# Patient Record
Sex: MALE | Race: WHITE | Employment: OTHER | URBAN - NONMETROPOLITAN AREA
[De-identification: names, ages, dates, MRNs, and addresses within clinical notes are randomized per-mention and may not be internally consistent; named-entity substitution may affect disease eponyms.]

---

## 2016-09-30 LAB
BUN BLDV-MCNC: NORMAL MG/DL
CALCIUM SERPL-MCNC: NORMAL MG/DL
CHLORIDE BLD-SCNC: NORMAL MMOL/L
CHOLESTEROL, TOTAL: 143 MG/DL
CHOLESTEROL/HDL RATIO: 4.5
CO2: NORMAL MMOL/L
CREAT SERPL-MCNC: NORMAL MG/DL
GFR CALCULATED: NORMAL
GLUCOSE BLD-MCNC: 106 MG/DL
HDLC SERPL-MCNC: 32 MG/DL (ref 35–70)
LDL CHOLESTEROL CALCULATED: 75.2 MG/DL (ref 0–160)
POTASSIUM SERPL-SCNC: NORMAL MMOL/L
SODIUM BLD-SCNC: NORMAL MMOL/L
TRIGL SERPL-MCNC: 179 MG/DL
VLDLC SERPL CALC-MCNC: 36 MG/DL

## 2017-04-24 RX ORDER — MELOXICAM 15 MG/1
15 TABLET ORAL DAILY
Qty: 30 TABLET | Refills: 5 | Status: SHIPPED | OUTPATIENT
Start: 2017-04-24 | End: 2018-05-08 | Stop reason: SDUPTHER

## 2017-04-24 RX ORDER — LISINOPRIL 20 MG/1
20 TABLET ORAL DAILY
Qty: 30 TABLET | Refills: 5 | Status: SHIPPED | OUTPATIENT
Start: 2017-04-24 | End: 2018-04-17 | Stop reason: ALTCHOICE

## 2017-08-02 DIAGNOSIS — R19.7 DIARRHEA, UNSPECIFIED TYPE: Primary | ICD-10-CM

## 2017-08-04 DIAGNOSIS — E78.00 PURE HYPERCHOLESTEROLEMIA: Primary | ICD-10-CM

## 2017-08-07 RX ORDER — SIMVASTATIN 40 MG
40 TABLET ORAL NIGHTLY
Qty: 30 TABLET | Refills: 3 | Status: SHIPPED | OUTPATIENT
Start: 2017-08-07 | End: 2018-04-17 | Stop reason: ALTCHOICE

## 2017-10-05 VITALS
DIASTOLIC BLOOD PRESSURE: 84 MMHG | HEART RATE: 80 BPM | WEIGHT: 315 LBS | SYSTOLIC BLOOD PRESSURE: 132 MMHG | HEIGHT: 70 IN | BODY MASS INDEX: 45.1 KG/M2

## 2017-10-05 DIAGNOSIS — I87.2 VENOUS INSUFFICIENCY (CHRONIC) (PERIPHERAL): ICD-10-CM

## 2017-10-05 DIAGNOSIS — G47.33 OBSTRUCTIVE SLEEP APNEA (ADULT) (PEDIATRIC): ICD-10-CM

## 2017-10-05 DIAGNOSIS — F17.210 CIGARETTE SMOKER: ICD-10-CM

## 2017-10-05 DIAGNOSIS — I10 UNSPECIFIED ESSENTIAL HYPERTENSION: ICD-10-CM

## 2017-10-05 DIAGNOSIS — K08.9 DISORDER OF TEETH AND SUPPORTING STRUCTURES: ICD-10-CM

## 2017-10-05 PROBLEM — E66.01 MORBID OBESITY (HCC): Status: ACTIVE | Noted: 2017-10-05

## 2017-10-05 PROBLEM — M16.11 OSTEOARTHRITIS OF RIGHT HIP: Status: ACTIVE | Noted: 2017-10-05

## 2017-10-05 PROBLEM — E78.5 HYPERLIPIDEMIA: Status: ACTIVE | Noted: 2017-10-05

## 2017-10-05 RX ORDER — LISINOPRIL AND HYDROCHLOROTHIAZIDE 25; 20 MG/1; MG/1
1 TABLET ORAL DAILY
COMMUNITY
End: 2017-10-17 | Stop reason: CLARIF

## 2017-10-05 RX ORDER — BACLOFEN 20 MG/1
20 TABLET ORAL 3 TIMES DAILY
COMMUNITY
End: 2018-07-24 | Stop reason: ALTCHOICE

## 2017-10-05 RX ORDER — ASPIRIN 325 MG
325 TABLET ORAL DAILY
COMMUNITY
End: 2020-02-04 | Stop reason: CLARIF

## 2017-10-17 ENCOUNTER — OFFICE VISIT (OUTPATIENT)
Dept: FAMILY MEDICINE CLINIC | Age: 52
End: 2017-10-17
Payer: MEDICARE

## 2017-10-17 VITALS
DIASTOLIC BLOOD PRESSURE: 60 MMHG | HEART RATE: 84 BPM | BODY MASS INDEX: 45.1 KG/M2 | WEIGHT: 315 LBS | HEIGHT: 70 IN | SYSTOLIC BLOOD PRESSURE: 126 MMHG

## 2017-10-17 DIAGNOSIS — E66.01 MORBID OBESITY (HCC): ICD-10-CM

## 2017-10-17 DIAGNOSIS — Z23 NEED FOR TETANUS BOOSTER: ICD-10-CM

## 2017-10-17 DIAGNOSIS — Z23 NEED FOR INFLUENZA VACCINATION: ICD-10-CM

## 2017-10-17 DIAGNOSIS — I10 ESSENTIAL HYPERTENSION: Primary | ICD-10-CM

## 2017-10-17 DIAGNOSIS — M16.11 PRIMARY OSTEOARTHRITIS OF RIGHT HIP: ICD-10-CM

## 2017-10-17 DIAGNOSIS — G47.33 OBSTRUCTIVE SLEEP APNEA: ICD-10-CM

## 2017-10-17 DIAGNOSIS — E78.2 MIXED HYPERLIPIDEMIA: ICD-10-CM

## 2017-10-17 DIAGNOSIS — I87.2 VENOUS INSUFFICIENCY (CHRONIC) (PERIPHERAL): ICD-10-CM

## 2017-10-17 DIAGNOSIS — M54.5 CHRONIC BILATERAL LOW BACK PAIN, WITH SCIATICA PRESENCE UNSPECIFIED: ICD-10-CM

## 2017-10-17 DIAGNOSIS — G89.29 CHRONIC BILATERAL LOW BACK PAIN, WITH SCIATICA PRESENCE UNSPECIFIED: ICD-10-CM

## 2017-10-17 PROCEDURE — 4004F PT TOBACCO SCREEN RCVD TLK: CPT | Performed by: FAMILY MEDICINE

## 2017-10-17 PROCEDURE — 90715 TDAP VACCINE 7 YRS/> IM: CPT | Performed by: FAMILY MEDICINE

## 2017-10-17 PROCEDURE — G8427 DOCREV CUR MEDS BY ELIG CLIN: HCPCS | Performed by: FAMILY MEDICINE

## 2017-10-17 PROCEDURE — 3017F COLORECTAL CA SCREEN DOC REV: CPT | Performed by: FAMILY MEDICINE

## 2017-10-17 PROCEDURE — G0008 ADMIN INFLUENZA VIRUS VAC: HCPCS | Performed by: FAMILY MEDICINE

## 2017-10-17 PROCEDURE — 90688 IIV4 VACCINE SPLT 0.5 ML IM: CPT | Performed by: FAMILY MEDICINE

## 2017-10-17 PROCEDURE — G8417 CALC BMI ABV UP PARAM F/U: HCPCS | Performed by: FAMILY MEDICINE

## 2017-10-17 PROCEDURE — 99214 OFFICE O/P EST MOD 30 MIN: CPT | Performed by: FAMILY MEDICINE

## 2017-10-17 PROCEDURE — G8484 FLU IMMUNIZE NO ADMIN: HCPCS | Performed by: FAMILY MEDICINE

## 2017-10-17 RX ORDER — OXYCODONE HYDROCHLORIDE AND ACETAMINOPHEN 5; 325 MG/1; MG/1
1 TABLET ORAL EVERY 4 HOURS PRN
COMMUNITY
End: 2018-07-24 | Stop reason: ALTCHOICE

## 2017-10-17 ASSESSMENT — ENCOUNTER SYMPTOMS
ABDOMINAL DISTENTION: 0
SHORTNESS OF BREATH: 0
CHEST TIGHTNESS: 0
SORE THROAT: 0
WHEEZING: 0
ABDOMINAL PAIN: 0

## 2017-10-17 ASSESSMENT — PATIENT HEALTH QUESTIONNAIRE - PHQ9
1. LITTLE INTEREST OR PLEASURE IN DOING THINGS: 0
2. FEELING DOWN, DEPRESSED OR HOPELESS: 0
SUM OF ALL RESPONSES TO PHQ9 QUESTIONS 1 & 2: 0
SUM OF ALL RESPONSES TO PHQ QUESTIONS 1-9: 0

## 2017-10-17 NOTE — PROGRESS NOTES
[address] [address] [address]  Dept: [phone]  Dept Fax: [phone]    Tristan Clark is a 46 y.o. male who presents today for his medical conditions/complaints as noted below. Tristan Clark is c/o of 6 Month Follow-Up; Hyperlipidemia (denies chest pains, sob, dizziness, palpatations. c/o leg edema); and Hypertension (BP cks on occasion, denies chest pains, sob, dizziness, palpatations. c/o leg edema)      HPI:     HPI  Patient is here for routine 6 month check up  Hypertension  Checking blood pressures routinely; tries to do it daily ; readings vary   well controlled; BP: 126/60   No problems with medication side effects; tolerating well  No chest pain  No edema   Hyperlipidemia   Takes medication daily   Cholesterol 143; HDL 32; LDL 75 ; 9/2016  Medication side effects - none   The CVD Risk score (D'Agostino, et al., 2008) failed to calculate for the following reasons: The patient has a prior MI, stroke, CHF, or peripheral vascular disease diagnosis  Chest pain -   Claudication -   TEOFILO  Has CPAP, compliant with: yes  Feels rested upon awakening  Denies excessive somnolence  Last evaluated ? Pulmonologist: Dr Keisha Francis  Pulmonary function tests: ?   COPD  Also see Dr Keisha Francis for this but continues to smoke 1 PPD   Pain management   See Dr Zain Guerrero for his back and his right hip  On baclofen and percocets; percocets once a day  Foreign body  Dr Noah Cabot removed a needle from his right heel back in August  Doesn't recall a tetanus shot; no record of one from ER and none in Dr Noah Cabot office     BP Readings from Last 3 Encounters:   10/17/17 126/60   04/04/17 132/84            (goal 120/80)    Past Medical History:   Diagnosis Date    Hyperlipidemia     Hypertriglyceridemia     Obesity     Tobacco abuse       Past Surgical History:   Procedure Laterality Date    ABSCESS DRAINAGE  08/2014    thrombosed external hemorrhoid Dr Tinnie Cabot History   Problem Relation Age of Onset    Diabetes Mother     Cancer Father     Other Brother      bad valve in heart     Social History   Substance Use Topics    Smoking status: Current Every Day Smoker     Packs/day: 1.00    Smokeless tobacco: Never Used    Alcohol use No        Current Outpatient Prescriptions   Medication Sig Dispense Refill    oxyCODONE-acetaminophen (PERCOCET) 5-325 MG per tablet Take 1 tablet by mouth every 4 hours as needed for Pain  Taking differently ; once daily .  baclofen (LIORESAL) 20 MG tablet Take 20 mg by mouth 3 times daily      Handicap Placard MISC by Does not apply route      CPAP Machine MISC by Does not apply route      aspirin 325 MG tablet Take 325 mg by mouth daily      simvastatin (ZOCOR) 40 MG tablet Take 1 tablet by mouth nightly 30 tablet 3    lisinopril (PRINIVIL;ZESTRIL) 20 MG tablet Take 1 tablet by mouth daily 30 tablet 5    meloxicam (MOBIC) 15 MG tablet Take 1 tablet by mouth daily 30 tablet 5     No current facility-administered medications for this visit. No Known Allergies    Health Maintenance   Topic Date Due    Hepatitis C screen  1965    Pneumococcal med risk (1 of 1 - PPSV23) 06/29/1984    Colon cancer screen colonoscopy  06/29/2015    DTaP/Tdap/Td vaccine (2 - Td) 07/13/2017    Flu vaccine (1) 09/01/2017    HIV screen  10/17/2027 (Originally 6/29/1980)    Lipid screen  09/30/2021       Subjective:      Review of Systems   Constitutional: Positive for activity change (he tries to walk every day ;and tries to watch what he eats). Negative for appetite change, chills, diaphoresis and fever. HENT: Negative for sore throat. Respiratory: Negative for chest tightness, shortness of breath and wheezing. Cardiovascular: Negative for chest pain, palpitations and leg swelling. Gastrointestinal: Negative for abdominal distention and abdominal pain. Genitourinary: Negative for difficulty urinating, dysuria and hematuria. Skin: Positive for rash (no sores on his legs). Hematological: Negative for adenopathy. Objective:     /60   Pulse 84   Ht 5' 10\" (1.778 m)   Wt (!) 418 lb (189.6 kg)   BMI 59.98 kg/m²     Physical Exam   Constitutional: He appears well-developed and well-nourished. No distress. Super morbid obesity    HENT:   Head: Normocephalic and atraumatic. Right Ear: Tympanic membrane normal.   Left Ear: Tympanic membrane normal.   Nose: Nose normal.   Mouth/Throat: No posterior oropharyngeal edema or posterior oropharyngeal erythema. Neck: Neck supple. Carotid bruit is not present. No thyromegaly present. Cardiovascular: Regular rhythm, S1 normal and S2 normal.    No murmur heard. Pulmonary/Chest: He has decreased breath sounds. He has no wheezes. He has no rhonchi. He has no rales. Abdominal: Soft. Bowel sounds are normal. There is no hepatosplenomegaly. There is no tenderness. Musculoskeletal: He exhibits no edema. Right hip: He exhibits decreased range of motion and tenderness. Antalgic gait over right hip    Lymphadenopathy:     He has no cervical adenopathy. He has no axillary adenopathy. Skin: No rash noted. Vitals reviewed. Assessment:     1. Essential hypertension  Comprehensive Metabolic Panel, Fasting    CBC Auto Differential   2. Mixed hyperlipidemia  Lipid Panel   3. Morbid obesity ([address]     4. Obstructive sleep apnea     5. Primary osteoarthritis of right hip     6. Venous insufficiency (chronic) (peripheral)     7. Chronic bilateral low back pain, with sciatica presence unspecified     8. Need for tetanus booster  Tetanus vaccine IM   9. Need for influenza vaccination  INFLUENZA, QUADV, 3 YRS AND OLDER, IM, MDV, 0.5ML ([facility])            POC Testing Results (If Applicable):  No results found for this visit on 10/17/17.     Plan:      encouraged to lose weight   Encouraged to quit smoking   Offered assistance for each of these if desired     Orders

## 2018-04-12 LAB
A/G RATIO: 1.3 RATIO
AGE FOR GFR: 52
ALBUMIN: 3.9 G/DL
ALK PHOSPHATASE: 81 UNITS/L
ALT SERPL-CCNC: 74 UNITS/L
ANION GAP SERPL CALCULATED.3IONS-SCNC: 17 MMOL/L
AST SERPL-CCNC: 51 UNITS/L
BASOPHILS # BLD: 0.14 THOU/MM3
BILIRUB SERPL-MCNC: 0.7 MG/DL
BUN BLDV-MCNC: 11 MG/DL
CALCIUM SERPL-MCNC: 9.2 MG/DL
CHLORIDE BLD-SCNC: 102 MMOL/L
CHOLESTEROL/HDL RATIO: 6.2 RATIO
CHOLESTEROL: 204 MG/DL
CO2: 26 MMOL/L
CREAT SERPL-MCNC: 0.8 MG/DL
DIFFERENTIAL: AUTOMATED DIFF
EGFR BF: 91 ML/MIN/1.73 M2
EGFR BM: 123 ML/MIN/1.73 M2
EGFR WF: 75 ML/MIN/1.73 M2
EGFR WM: 102 ML/MIN/1.73 M2
EOSINOPHIL # BLD: 0.35 THOU/MM3
GLOBULIN: 3.1 G/DL
GLUCOSE: 118 MG/DL
HCT VFR BLD CALC: 48.8 %
HDL, DIRECT: 33 MG/DL
HEMOGLOBIN: 15.8 G/DL
LDL CHOLESTEROL CALCULATED: 117.8 MG/DL
LYMPHOCYTES # BLD: 2.91 THOU/MM3
MCH RBC QN AUTO: 29.9 PG
MCHC RBC AUTO-ENTMCNC: 32.4 G/DL
MCV RBC AUTO: 92.3 FL
MONOCYTES # BLD: 0.69 THOU/MM3
NEUTROPHILS: 3.81 THOU/MM3
PDW BLD-RTO: 13.1 %
PLATELET # BLD: 172 THOU/MM3
PMV BLD AUTO: 7.6 FL
POTASSIUM SERPL-SCNC: 4.2 MMOL/L
RBC # BLD: 5.28 M/UL
SODIUM BLD-SCNC: 141 MMOL/L
TOTAL PROTEIN: 7 G/DL
TRIGL SERPL-MCNC: 266 MG/DL
VLDLC SERPL CALC-MCNC: 53 MG/DL
WBC # BLD: 7.89 THOU/ML3

## 2018-04-17 ENCOUNTER — OFFICE VISIT (OUTPATIENT)
Dept: FAMILY MEDICINE CLINIC | Age: 53
End: 2018-04-17
Payer: MEDICARE

## 2018-04-17 VITALS
DIASTOLIC BLOOD PRESSURE: 90 MMHG | WEIGHT: 315 LBS | HEART RATE: 84 BPM | SYSTOLIC BLOOD PRESSURE: 140 MMHG | BODY MASS INDEX: 61.56 KG/M2

## 2018-04-17 DIAGNOSIS — M16.11 ARTHRITIS OF RIGHT HIP: ICD-10-CM

## 2018-04-17 DIAGNOSIS — E66.01 MORBID OBESITY (HCC): ICD-10-CM

## 2018-04-17 DIAGNOSIS — M19.019 ARTHRITIS OF SHOULDER: ICD-10-CM

## 2018-04-17 DIAGNOSIS — G47.33 OBSTRUCTIVE SLEEP APNEA: ICD-10-CM

## 2018-04-17 DIAGNOSIS — F17.210 CIGARETTE SMOKER: ICD-10-CM

## 2018-04-17 DIAGNOSIS — E78.2 MIXED HYPERLIPIDEMIA: ICD-10-CM

## 2018-04-17 DIAGNOSIS — Z11.59 NEED FOR HEPATITIS C SCREENING TEST: ICD-10-CM

## 2018-04-17 DIAGNOSIS — Z12.11 ENCOUNTER FOR SCREENING COLONOSCOPY: ICD-10-CM

## 2018-04-17 DIAGNOSIS — M16.11 PRIMARY OSTEOARTHRITIS OF RIGHT HIP: ICD-10-CM

## 2018-04-17 DIAGNOSIS — I10 ESSENTIAL HYPERTENSION: Primary | ICD-10-CM

## 2018-04-17 DIAGNOSIS — I87.2 VENOUS INSUFFICIENCY (CHRONIC) (PERIPHERAL): ICD-10-CM

## 2018-04-17 PROCEDURE — 99214 OFFICE O/P EST MOD 30 MIN: CPT | Performed by: FAMILY MEDICINE

## 2018-04-17 PROCEDURE — G8417 CALC BMI ABV UP PARAM F/U: HCPCS | Performed by: FAMILY MEDICINE

## 2018-04-17 PROCEDURE — 3017F COLORECTAL CA SCREEN DOC REV: CPT | Performed by: FAMILY MEDICINE

## 2018-04-17 PROCEDURE — 4004F PT TOBACCO SCREEN RCVD TLK: CPT | Performed by: FAMILY MEDICINE

## 2018-04-17 PROCEDURE — G8427 DOCREV CUR MEDS BY ELIG CLIN: HCPCS | Performed by: FAMILY MEDICINE

## 2018-04-17 RX ORDER — LISINOPRIL AND HYDROCHLOROTHIAZIDE 25; 20 MG/1; MG/1
1 TABLET ORAL DAILY
Qty: 30 TABLET | Refills: 5 | Status: SHIPPED | OUTPATIENT
Start: 2018-04-17 | End: 2018-10-08 | Stop reason: SDUPTHER

## 2018-04-17 RX ORDER — ATORVASTATIN CALCIUM 40 MG/1
40 TABLET, FILM COATED ORAL DAILY
Qty: 30 TABLET | Refills: 5 | Status: SHIPPED | OUTPATIENT
Start: 2018-04-17 | End: 2018-10-08 | Stop reason: SDUPTHER

## 2018-04-17 ASSESSMENT — ENCOUNTER SYMPTOMS
CHEST TIGHTNESS: 0
ABDOMINAL DISTENTION: 0
WHEEZING: 0
SHORTNESS OF BREATH: 0
ABDOMINAL PAIN: 0
SORE THROAT: 0

## 2018-05-08 DIAGNOSIS — M19.90 ARTHRITIS: Primary | ICD-10-CM

## 2018-05-08 RX ORDER — MELOXICAM 15 MG/1
TABLET ORAL
Qty: 30 TABLET | Refills: 5 | Status: SHIPPED | OUTPATIENT
Start: 2018-05-08 | End: 2018-11-07 | Stop reason: SDUPTHER

## 2018-07-23 LAB
AGE FOR GFR: 53
ALT SERPL-CCNC: 48 UNITS/L
ANION GAP SERPL CALCULATED.3IONS-SCNC: 13 MMOL/L
AST SERPL-CCNC: 26 UNITS/L
BUN BLDV-MCNC: 20 MG/DL
CALCIUM SERPL-MCNC: 9.1 MG/DL
CHLORIDE BLD-SCNC: 102 MMOL/L
CHOLESTEROL/HDL RATIO: 4.1 RATIO
CHOLESTEROL: 148 MG/DL
CO2: 26 MMOL/L
CREAT SERPL-MCNC: 0.9 MG/DL
EGFR BF: 79 ML/MIN/1.73 M2
EGFR BM: 107 ML/MIN/1.73 M2
EGFR WF: 65 ML/MIN/1.73 M2
EGFR WM: 88 ML/MIN/1.73 M2
GLUCOSE: 121 MG/DL
HDL, DIRECT: 36 MG/DL
LDL CHOLESTEROL CALCULATED: 69.6 MG/DL
POTASSIUM SERPL-SCNC: 3.9 MMOL/L
SODIUM BLD-SCNC: 137 MMOL/L
TRIGL SERPL-MCNC: 212 MG/DL
VLDLC SERPL CALC-MCNC: 42 MG/DL

## 2018-07-24 ENCOUNTER — OFFICE VISIT (OUTPATIENT)
Dept: FAMILY MEDICINE CLINIC | Age: 53
End: 2018-07-24
Payer: MEDICARE

## 2018-07-24 VITALS
BODY MASS INDEX: 59.4 KG/M2 | HEART RATE: 100 BPM | DIASTOLIC BLOOD PRESSURE: 80 MMHG | WEIGHT: 315 LBS | SYSTOLIC BLOOD PRESSURE: 136 MMHG

## 2018-07-24 DIAGNOSIS — R73.01 IMPAIRED FASTING GLUCOSE: ICD-10-CM

## 2018-07-24 DIAGNOSIS — F17.210 CIGARETTE SMOKER: ICD-10-CM

## 2018-07-24 DIAGNOSIS — G47.33 OBSTRUCTIVE SLEEP APNEA: ICD-10-CM

## 2018-07-24 DIAGNOSIS — I10 ESSENTIAL HYPERTENSION: Primary | ICD-10-CM

## 2018-07-24 DIAGNOSIS — E11.8 TYPE 2 DIABETES MELLITUS WITH COMPLICATION, WITHOUT LONG-TERM CURRENT USE OF INSULIN (HCC): ICD-10-CM

## 2018-07-24 DIAGNOSIS — E78.2 MIXED HYPERLIPIDEMIA: ICD-10-CM

## 2018-07-24 DIAGNOSIS — M16.11 PRIMARY OSTEOARTHRITIS OF RIGHT HIP: ICD-10-CM

## 2018-07-24 DIAGNOSIS — I87.2 VENOUS INSUFFICIENCY (CHRONIC) (PERIPHERAL): ICD-10-CM

## 2018-07-24 DIAGNOSIS — E66.01 MORBID OBESITY (HCC): ICD-10-CM

## 2018-07-24 LAB — HBA1C MFR BLD: 6.7 %

## 2018-07-24 PROCEDURE — 83036 HEMOGLOBIN GLYCOSYLATED A1C: CPT | Performed by: FAMILY MEDICINE

## 2018-07-24 PROCEDURE — 3017F COLORECTAL CA SCREEN DOC REV: CPT | Performed by: FAMILY MEDICINE

## 2018-07-24 PROCEDURE — 3044F HG A1C LEVEL LT 7.0%: CPT | Performed by: FAMILY MEDICINE

## 2018-07-24 PROCEDURE — 2022F DILAT RTA XM EVC RTNOPTHY: CPT | Performed by: FAMILY MEDICINE

## 2018-07-24 PROCEDURE — G8427 DOCREV CUR MEDS BY ELIG CLIN: HCPCS | Performed by: FAMILY MEDICINE

## 2018-07-24 PROCEDURE — 4004F PT TOBACCO SCREEN RCVD TLK: CPT | Performed by: FAMILY MEDICINE

## 2018-07-24 PROCEDURE — 99214 OFFICE O/P EST MOD 30 MIN: CPT | Performed by: FAMILY MEDICINE

## 2018-07-24 PROCEDURE — G8417 CALC BMI ABV UP PARAM F/U: HCPCS | Performed by: FAMILY MEDICINE

## 2018-07-24 ASSESSMENT — ENCOUNTER SYMPTOMS
ABDOMINAL DISTENTION: 0
SHORTNESS OF BREATH: 0
CHEST TIGHTNESS: 0
WHEEZING: 0
SORE THROAT: 0
ABDOMINAL PAIN: 0

## 2018-07-24 NOTE — PROGRESS NOTES
Series) 06/29/2015    Colon cancer screen colonoscopy  06/29/2015    HIV screen  10/17/2027 (Originally 6/29/1980)    Flu vaccine (1) 09/01/2018    Potassium monitoring  04/12/2019    Creatinine monitoring  04/12/2019    Lipid screen  04/12/2023    DTaP/Tdap/Td vaccine (3 - Td) 10/17/2027       Subjective:      Review of Systems   Constitutional: Negative for activity change, appetite change, chills, diaphoresis and fever. HENT: Negative for sore throat. Respiratory: Negative for chest tightness, shortness of breath and wheezing. Cardiovascular: Negative for chest pain, palpitations and leg swelling. Gastrointestinal: Negative for abdominal distention and abdominal pain. Genitourinary: Negative for difficulty urinating, dysuria and hematuria. Musculoskeletal: Positive for arthralgias (right shoulder and right hip) and gait problem. Negative for joint swelling. Skin: Positive for rash (some sores on his leg ). Hematological: Negative for adenopathy. Objective:     /80   Pulse 100   Wt (!) 414 lb (187.8 kg)   BMI 59.40 kg/m²     Physical Exam   Constitutional: He appears well-developed and well-nourished. No distress. Super morbid obesity    HENT:   Head: Normocephalic and atraumatic. Right Ear: Tympanic membrane normal.   Left Ear: Tympanic membrane normal.   Nose: Nose normal.   Mouth/Throat: No posterior oropharyngeal edema or posterior oropharyngeal erythema. Neck: Neck supple. Carotid bruit is not present. No thyromegaly present. Cardiovascular: Regular rhythm, S1 normal and S2 normal.    No murmur heard. Pulmonary/Chest: He has decreased breath sounds. He has no wheezes. He has no rhonchi. He has no rales. Abdominal: Soft. Bowel sounds are normal. There is no hepatosplenomegaly. There is no tenderness. Musculoskeletal: He exhibits edema. Right shoulder: He exhibits no swelling.         Right hip: He exhibits decreased range of motion, tenderness (marked discomfort with internal rotation, weight bearing ) and deformity (Marked internal rotation). He exhibits no bony tenderness and no swelling. Antalgic gait over right hip    Lymphadenopathy:     He has no cervical adenopathy. He has no axillary adenopathy. Skin: No rash noted. Vitals reviewed. Assessment:      Diagnosis Orders   1. Essential hypertension     2. Venous insufficiency (chronic) (peripheral)     3. Obstructive sleep apnea     4. Primary osteoarthritis of right hip     5. Mixed hyperlipidemia     6. Cigarette smoker     7. Morbid obesity ([address]     8. Impaired fasting glucose  POCT glycosylated hemoglobin (Hb A1C)   9. Type 2 diabetes mellitus with complication, without long-term current use of insulin ([facility])  empagliflozin (JARDIANCE) 10 MG tablet            POC Testing Results (If Applicable):  Results for POC orders placed in visit on 07/24/18   POCT glycosylated hemoglobin (Hb A1C)   Result Value Ref Range    Hemoglobin A1C 6.7 %       Plan:   he is congratulated on his efforts at weight loss and loss of 15 pounds. Encouraged to continue. We'll try jardiance for his diabetes. Perhaps this will aid in his efforts to lose weight. He will notify me if it is too expensive which case we can straighten metformin  We'll see if we will can expedite his referral to Dr. Thomas Ramirez. He should've her something by now. Continue to monitor blood pressure. Continue try to cut back on cigarettes. Recheck in 3 months sooner if any problems  Orders Given:  Orders Placed This Encounter   Procedures    POCT glycosylated hemoglobin (Hb A1C)     Prescriptions:    Orders Placed This Encounter   Medications    empagliflozin (JARDIANCE) 10 MG tablet     Sig: Take 1 tablet by mouth daily     Dispense:  30 tablet     Refill:  5        Return in about 3 months (around 10/24/2018). Electronically signed by Hoa Eaton MD on 7/24/2018.         **This report has been created using voice recognition software. It may contain minor errors which are inherent in voice recognition technology. **

## 2018-07-25 ENCOUNTER — TELEPHONE (OUTPATIENT)
Dept: FAMILY MEDICINE CLINIC | Age: 53
End: 2018-07-25

## 2018-07-27 NOTE — TELEPHONE ENCOUNTER
Then just try to continue to lose some more weight and we can check this again at his next visit in 3 months

## 2018-10-08 DIAGNOSIS — E78.2 MIXED HYPERLIPIDEMIA: ICD-10-CM

## 2018-10-08 DIAGNOSIS — I10 ESSENTIAL HYPERTENSION: ICD-10-CM

## 2018-10-08 RX ORDER — ATORVASTATIN CALCIUM 40 MG/1
TABLET, FILM COATED ORAL
Qty: 30 TABLET | Refills: 5 | Status: SHIPPED | OUTPATIENT
Start: 2018-10-08 | End: 2019-04-12 | Stop reason: SDUPTHER

## 2018-10-08 RX ORDER — LISINOPRIL AND HYDROCHLOROTHIAZIDE 25; 20 MG/1; MG/1
TABLET ORAL
Qty: 30 TABLET | Refills: 5 | Status: SHIPPED | OUTPATIENT
Start: 2018-10-08 | End: 2019-04-12 | Stop reason: SDUPTHER

## 2018-10-08 NOTE — TELEPHONE ENCOUNTER
[address] [place] [address] is calling to request a refill on the following medication(s):  Requested Prescriptions     Pending Prescriptions Disp Refills    atorvastatin (LIPITOR) 40 MG tablet [Pharmacy Med Name: ATORVASTATIN 40MG   TAB] 30 tablet 5     Sig: TAKE 1 TABLET BY MOUTH ONCE DAILY    lisinopril-hydrochlorothiazide (PRINZIDE;ZESTORETIC) 20-25 MG per tablet [Pharmacy Med Name: LISINOPRIL/HCTZ 20-25MG TAB] 30 tablet 5     Sig: TAKE 1 TABLET BY MOUTH ONCE DAILY       Last Visit Date (If Applicable):  5/58/4849    Next Visit Date:    10/29/2018

## 2018-10-23 ENCOUNTER — TELEPHONE (OUTPATIENT)
Dept: FAMILY MEDICINE CLINIC | Age: 53
End: 2018-10-23

## 2018-10-24 NOTE — TELEPHONE ENCOUNTER
Susan Leyva notified seen Matilde Quiñones and stated that he needed to speak with Dr. Liborio Jay and Dr. Kike Meraz regarding    Seen him about a few months ago, will need to contact Angela's office regarding    Spoke with Angela office and we can send all the information to them and they will call patient    Printed off some info and faxed to them  [phone]

## 2018-10-28 PROBLEM — E11.8 TYPE 2 DIABETES MELLITUS WITH COMPLICATION, WITHOUT LONG-TERM CURRENT USE OF INSULIN (HCC): Status: ACTIVE | Noted: 2018-10-28

## 2018-10-29 ENCOUNTER — OFFICE VISIT (OUTPATIENT)
Dept: FAMILY MEDICINE CLINIC | Age: 53
End: 2018-10-29
Payer: MEDICARE

## 2018-10-29 VITALS
SYSTOLIC BLOOD PRESSURE: 110 MMHG | HEIGHT: 70 IN | WEIGHT: 315 LBS | DIASTOLIC BLOOD PRESSURE: 60 MMHG | BODY MASS INDEX: 45.1 KG/M2 | HEART RATE: 94 BPM | OXYGEN SATURATION: 98 %

## 2018-10-29 DIAGNOSIS — I87.2 VENOUS INSUFFICIENCY (CHRONIC) (PERIPHERAL): ICD-10-CM

## 2018-10-29 DIAGNOSIS — M16.11 PRIMARY OSTEOARTHRITIS OF RIGHT HIP: ICD-10-CM

## 2018-10-29 DIAGNOSIS — E78.2 MIXED HYPERLIPIDEMIA: ICD-10-CM

## 2018-10-29 DIAGNOSIS — F17.210 CIGARETTE SMOKER: ICD-10-CM

## 2018-10-29 DIAGNOSIS — G47.33 OBSTRUCTIVE SLEEP APNEA: ICD-10-CM

## 2018-10-29 DIAGNOSIS — B35.3 TINEA PEDIS OF BOTH FEET: ICD-10-CM

## 2018-10-29 DIAGNOSIS — E11.8 TYPE 2 DIABETES MELLITUS WITH COMPLICATION, WITHOUT LONG-TERM CURRENT USE OF INSULIN (HCC): ICD-10-CM

## 2018-10-29 DIAGNOSIS — Z23 NEED FOR INFLUENZA VACCINATION: ICD-10-CM

## 2018-10-29 DIAGNOSIS — I10 ESSENTIAL HYPERTENSION: Primary | ICD-10-CM

## 2018-10-29 DIAGNOSIS — E66.01 MORBID OBESITY (HCC): ICD-10-CM

## 2018-10-29 LAB
AGE FOR GFR: 53
ANION GAP SERPL CALCULATED.3IONS-SCNC: 10 MMOL/L
BUN BLDV-MCNC: 10 MG/DL
CALCIUM SERPL-MCNC: 8.9 MG/DL
CHLORIDE BLD-SCNC: 100 MMOL/L
CO2: 29 MMOL/L
CREAT SERPL-MCNC: 0.7 MG/DL
CREATININE, RANDOM: 103.7 MG/DL
EGFR BF: 106 ML/MIN/1.73 M2
EGFR BM: 143 ML/MIN/1.73 M2
EGFR WF: 88 ML/MIN/1.73 M2
EGFR WM: 118 ML/MIN/1.73 M2
GLUCOSE: 124 MG/DL
HBA1C MFR BLD: 6 %
MICROALBUMIN UR-MCNC: 0.8 MG/DL
MICROALBUMIN/CREAT UR-RTO: 7.7 MCG/MG CR
POTASSIUM SERPL-SCNC: 3.4 MMOL/L
SODIUM BLD-SCNC: 136 MMOL/L

## 2018-10-29 PROCEDURE — 90688 IIV4 VACCINE SPLT 0.5 ML IM: CPT | Performed by: FAMILY MEDICINE

## 2018-10-29 PROCEDURE — G8482 FLU IMMUNIZE ORDER/ADMIN: HCPCS | Performed by: FAMILY MEDICINE

## 2018-10-29 PROCEDURE — 83036 HEMOGLOBIN GLYCOSYLATED A1C: CPT | Performed by: FAMILY MEDICINE

## 2018-10-29 PROCEDURE — G8417 CALC BMI ABV UP PARAM F/U: HCPCS | Performed by: FAMILY MEDICINE

## 2018-10-29 PROCEDURE — G0008 ADMIN INFLUENZA VIRUS VAC: HCPCS | Performed by: FAMILY MEDICINE

## 2018-10-29 PROCEDURE — 4004F PT TOBACCO SCREEN RCVD TLK: CPT | Performed by: FAMILY MEDICINE

## 2018-10-29 PROCEDURE — 3017F COLORECTAL CA SCREEN DOC REV: CPT | Performed by: FAMILY MEDICINE

## 2018-10-29 PROCEDURE — 2022F DILAT RTA XM EVC RTNOPTHY: CPT | Performed by: FAMILY MEDICINE

## 2018-10-29 PROCEDURE — 3044F HG A1C LEVEL LT 7.0%: CPT | Performed by: FAMILY MEDICINE

## 2018-10-29 PROCEDURE — 99214 OFFICE O/P EST MOD 30 MIN: CPT | Performed by: FAMILY MEDICINE

## 2018-10-29 PROCEDURE — G8427 DOCREV CUR MEDS BY ELIG CLIN: HCPCS | Performed by: FAMILY MEDICINE

## 2018-10-29 ASSESSMENT — PATIENT HEALTH QUESTIONNAIRE - PHQ9
2. FEELING DOWN, DEPRESSED OR HOPELESS: 0
SUM OF ALL RESPONSES TO PHQ QUESTIONS 1-9: 0
SUM OF ALL RESPONSES TO PHQ9 QUESTIONS 1 & 2: 0
1. LITTLE INTEREST OR PLEASURE IN DOING THINGS: 0
SUM OF ALL RESPONSES TO PHQ QUESTIONS 1-9: 0

## 2018-11-01 NOTE — PROGRESS NOTES
[address] [address] [address]  Dept: [phone]  Dept Fax: [phone]    Carmenza Bullard is a 48 y.o. male who presents today for his medical conditions/complaints as noted below. Carmenza Bullard is c/o of 3 Month Follow-Up (pt denies any issues or complaints); Diabetes (denies bs ck); Hyperlipidemia; and Hypertension      HPI:     The patient is a 63-year-old white male here for 3-month followup. Hypertension  He checks his blood pressure fairly frequently most days. Blood pressure for us is 110/60. He remains on lisinopril-hydrochlorothiazide 20/25. This is his sole antihypertensive. He denies increasing shortness of breath or dyspnea on exertion although he is limited by his tobacco use and also by morbid obesity but does not feel any worse. His edema is fairly well controlled. Non-insulin dependent diabetes  At last visit, we placed him on Jardiance. He is able to afford the medication. He has noticed increased urination but otherwise no side effects. We added this for cardioprotective effects and for possible weight loss. He has lost another 4 pounds. He does try to diet but activity is limited by his obesity and osteoarthritis of his hip. Hyperlipidemia   He remains on atorvastatin 40 mg daily. He denies any increase in arthralgias or myalgias. His lipid profile in July showed a total cholesterol 148, HDL 36, and LDL 69. Tobacco addiction  He has cut back. He continues to try to reduce his smoking. He is smoking about one-quarter pack per day. He denies increasing cough. He denies hemoptysis. He does not need to use an inhaler. He will receive his flu vaccine today. He has not received any pneumococcal vaccinations yet. Obstructive sleep apnea  He remains on CPAP. He follows up with Dr. Mckenna Marte. He denies increasing somnolence or fatigue.       Osteoarthritis of the right hip  We are still trying to get

## 2018-11-02 ASSESSMENT — ENCOUNTER SYMPTOMS
WHEEZING: 0
SORE THROAT: 0
CHEST TIGHTNESS: 0
SHORTNESS OF BREATH: 0
ABDOMINAL DISTENTION: 0
ABDOMINAL PAIN: 0

## 2018-11-07 DIAGNOSIS — M19.90 ARTHRITIS: ICD-10-CM

## 2018-11-08 RX ORDER — MELOXICAM 15 MG/1
TABLET ORAL
Qty: 30 TABLET | Refills: 5 | Status: SHIPPED | OUTPATIENT
Start: 2018-11-08 | End: 2019-05-25 | Stop reason: SDUPTHER

## 2018-11-13 ENCOUNTER — OFFICE VISIT (OUTPATIENT)
Dept: CARDIOLOGY CLINIC | Age: 53
End: 2018-11-13
Payer: MEDICARE

## 2018-11-13 VITALS
SYSTOLIC BLOOD PRESSURE: 120 MMHG | WEIGHT: 315 LBS | HEIGHT: 70 IN | BODY MASS INDEX: 45.1 KG/M2 | HEART RATE: 98 BPM | DIASTOLIC BLOOD PRESSURE: 70 MMHG

## 2018-11-13 DIAGNOSIS — G47.33 OBSTRUCTIVE SLEEP APNEA: ICD-10-CM

## 2018-11-13 DIAGNOSIS — I10 ESSENTIAL HYPERTENSION: ICD-10-CM

## 2018-11-13 DIAGNOSIS — E78.5 HYPERLIPIDEMIA, UNSPECIFIED HYPERLIPIDEMIA TYPE: ICD-10-CM

## 2018-11-13 DIAGNOSIS — E66.01 MORBID OBESITY (HCC): ICD-10-CM

## 2018-11-13 DIAGNOSIS — E11.8 TYPE 2 DIABETES MELLITUS WITH COMPLICATION, WITHOUT LONG-TERM CURRENT USE OF INSULIN (HCC): ICD-10-CM

## 2018-11-13 DIAGNOSIS — Z01.810 PREOP CARDIOVASCULAR EXAM: ICD-10-CM

## 2018-11-13 DIAGNOSIS — R06.09 DYSPNEA ON EXERTION: Primary | ICD-10-CM

## 2018-11-13 PROCEDURE — 99203 OFFICE O/P NEW LOW 30 MIN: CPT | Performed by: INTERNAL MEDICINE

## 2018-11-13 RX ORDER — BUPROPION HYDROCHLORIDE 150 MG/1
150 TABLET, EXTENDED RELEASE ORAL DAILY
COMMUNITY
End: 2020-07-16 | Stop reason: SDUPTHER

## 2018-11-13 NOTE — PROGRESS NOTES
Today's Date: 11/13/2018  Patient's Name: Melo Ramsey  Patient's age: 48 y.o., 1965    Subjective: Melo Ramsey  is here to establish cardiology care. He has severe DJD of right hip and surgery has been recommended. He is unable to walk properly due to this issue. He reports dyspnea on mild - moderate exertion. He denies any chest pain pain. He denies any PND, no syncope or pre-syncope, no orthopnea. He wears CPAP at night. He is a chronic smoker for more than 20 years. He denies any MI in family but has h/o valve surgery in family      Past Medical History:   has a past medical history of Hyperlipidemia; Hypertriglyceridemia; Obesity; and Tobacco abuse. Past Surgical History:   has a past surgical history that includes Abscess Drainage (08/2014). Home Medications:  Prior to Admission medications    Medication Sig Start Date End Date Taking? Authorizing Provider   buPROPion (WELLBUTRIN SR) 150 MG extended release tablet Take 150 mg by mouth daily   Yes Historical Provider, MD   meloxicam (MOBIC) 15 MG tablet TAKE 1 TABLET BY MOUTH ONCE DAILY 11/8/18  Yes Alina Kyle MD   atorvastatin (LIPITOR) 40 MG tablet TAKE 1 TABLET BY MOUTH ONCE DAILY 10/8/18  Yes Alina Kyle MD   lisinopril-hydrochlorothiazide (PRINZIDE;ZESTORETIC) 20-25 MG per tablet TAKE 1 TABLET BY MOUTH ONCE DAILY 10/8/18  Yes Alina Kyle MD   empagliflozin (JARDIANCE) 10 MG tablet Take 1 tablet by mouth daily 7/24/18  Yes Alina Kyle MD   CPAP Machine MISC by Does not apply route   Yes Historical Provider, MD   aspirin 325 MG tablet Take 325 mg by mouth daily   Yes Historical Provider, MD   Handicap Placard MISC by Does not apply route    Historical Provider, MD       Allergies:  Patient has no known allergies. Social History:   reports that he has been smoking Cigarettes. He has been smoking about 0.70 packs per day. He has never used smokeless tobacco. He reports that he does not drink alcohol.       ROS:

## 2018-11-29 ENCOUNTER — TELEPHONE (OUTPATIENT)
Dept: CARDIOLOGY CLINIC | Age: 53
End: 2018-11-29

## 2018-11-29 DIAGNOSIS — E11.8 TYPE 2 DIABETES MELLITUS WITH COMPLICATION, WITHOUT LONG-TERM CURRENT USE OF INSULIN (HCC): ICD-10-CM

## 2018-11-29 DIAGNOSIS — I10 ESSENTIAL HYPERTENSION: ICD-10-CM

## 2018-11-29 DIAGNOSIS — R06.09 DYSPNEA ON EXERTION: ICD-10-CM

## 2018-11-29 DIAGNOSIS — E78.5 HYPERLIPIDEMIA, UNSPECIFIED HYPERLIPIDEMIA TYPE: ICD-10-CM

## 2018-11-29 DIAGNOSIS — Z01.810 PREOP CARDIOVASCULAR EXAM: ICD-10-CM

## 2018-11-30 ENCOUNTER — OFFICE VISIT (OUTPATIENT)
Dept: CARDIOLOGY CLINIC | Age: 53
End: 2018-11-30
Payer: MEDICARE

## 2018-11-30 VITALS
SYSTOLIC BLOOD PRESSURE: 126 MMHG | DIASTOLIC BLOOD PRESSURE: 76 MMHG | HEART RATE: 96 BPM | BODY MASS INDEX: 58.68 KG/M2 | WEIGHT: 315 LBS

## 2018-11-30 DIAGNOSIS — I87.2 VENOUS INSUFFICIENCY (CHRONIC) (PERIPHERAL): ICD-10-CM

## 2018-11-30 DIAGNOSIS — E78.5 HYPERLIPIDEMIA, UNSPECIFIED HYPERLIPIDEMIA TYPE: ICD-10-CM

## 2018-11-30 DIAGNOSIS — E11.8 TYPE 2 DIABETES MELLITUS WITH COMPLICATION, WITHOUT LONG-TERM CURRENT USE OF INSULIN (HCC): ICD-10-CM

## 2018-11-30 DIAGNOSIS — G47.33 OBSTRUCTIVE SLEEP APNEA: ICD-10-CM

## 2018-11-30 DIAGNOSIS — E66.01 MORBID OBESITY (HCC): ICD-10-CM

## 2018-11-30 DIAGNOSIS — I10 ESSENTIAL HYPERTENSION: Primary | ICD-10-CM

## 2018-11-30 PROCEDURE — 99212 OFFICE O/P EST SF 10 MIN: CPT | Performed by: INTERNAL MEDICINE

## 2018-11-30 NOTE — PROGRESS NOTES
Otherwise 10 systems reviewed and negative. /76   Pulse 96   Wt (!) 408 lb 15.3 oz (185.5 kg)   BMI 58.68 kg/m²     Vitals as above. Alert and oriented x 3. No JVD, or carotid bruits. Lungs are clear to auscultation. Heart sounds are regular, normal, no murmurs, clicks, gallops or rubs S1, S2.   Abdomen is soft, no tenderness. Extremities No peripheral edema    Cardiac Data:  EKG: SR, possible LVH    Labs:     CBC: No results for input(s): WBC, HGB, HCT, PLT in the last 72 hours. BMP: No results for input(s): NA, K, CO2, BUN, CREATININE, LABGLOM, GLUCOSE in the last 72 hours. PT/INR: No results for input(s): PROTIME, INR in the last 72 hours. FASTING LIPID PANEL:  Lab Results   Component Value Date    HDL 32 09/30/2016    LDLCALC 69.6 07/23/2018    TRIG 212 07/23/2018     LIVER PROFILE:No results for input(s): AST, ALT, LABALBU in the last 72 hours. TTE 4/24/17 : LVEF 55%. Mild to moderate LVH, Mild TR, RVSP 41 mmHg    Stress 11/27/18  Fixed inferior defect  Probable mild distal anteroapical reversible defect suggestive of ischemia  EF 58%  Low Risk Study    Assessment and plan:    Low risk for planned hip surgery. Follow up in 3 months  -HTN- failry well controlled at this time. Continue current therapy.   -Obesity - encouraged diet, exercise, and discussed weight loss extensively. -TEOFILO-on CPAP- check echo for RV strain on next visit. -Hyperlipidemia- continue statin. -DM- taking jardince.  -Counseled to quit smoking. Thank you for allowing me to participate in the care of this patient, please do not hesitate to call if you have any questions. Kana Paulino, [address] [facility]  [facility]. [address]  [phone]       '

## 2019-01-22 ENCOUNTER — OFFICE VISIT (OUTPATIENT)
Dept: FAMILY MEDICINE CLINIC | Age: 54
End: 2019-01-22
Payer: MEDICARE

## 2019-01-22 VITALS
WEIGHT: 315 LBS | SYSTOLIC BLOOD PRESSURE: 120 MMHG | DIASTOLIC BLOOD PRESSURE: 66 MMHG | BODY MASS INDEX: 59.83 KG/M2 | OXYGEN SATURATION: 96 % | HEART RATE: 92 BPM

## 2019-01-22 DIAGNOSIS — E66.01 MORBID OBESITY (HCC): ICD-10-CM

## 2019-01-22 DIAGNOSIS — I87.2 VENOUS INSUFFICIENCY (CHRONIC) (PERIPHERAL): ICD-10-CM

## 2019-01-22 DIAGNOSIS — G47.33 OBSTRUCTIVE SLEEP APNEA: ICD-10-CM

## 2019-01-22 DIAGNOSIS — E78.2 MIXED HYPERLIPIDEMIA: ICD-10-CM

## 2019-01-22 DIAGNOSIS — E11.8 TYPE 2 DIABETES MELLITUS WITH COMPLICATION, UNSPECIFIED WHETHER LONG TERM INSULIN USE: Primary | ICD-10-CM

## 2019-01-22 DIAGNOSIS — F17.210 CIGARETTE SMOKER: ICD-10-CM

## 2019-01-22 DIAGNOSIS — I10 ESSENTIAL HYPERTENSION: ICD-10-CM

## 2019-01-22 DIAGNOSIS — E66.01 MORBID OBESITY WITH BMI OF 50.0-59.9, ADULT (HCC): ICD-10-CM

## 2019-01-22 DIAGNOSIS — E11.8 TYPE 2 DIABETES MELLITUS WITH COMPLICATION, WITHOUT LONG-TERM CURRENT USE OF INSULIN (HCC): ICD-10-CM

## 2019-01-22 LAB — HBA1C MFR BLD: 5.9 %

## 2019-01-22 PROCEDURE — G8417 CALC BMI ABV UP PARAM F/U: HCPCS | Performed by: FAMILY MEDICINE

## 2019-01-22 PROCEDURE — 99214 OFFICE O/P EST MOD 30 MIN: CPT | Performed by: FAMILY MEDICINE

## 2019-01-22 PROCEDURE — G8427 DOCREV CUR MEDS BY ELIG CLIN: HCPCS | Performed by: FAMILY MEDICINE

## 2019-01-22 PROCEDURE — 3044F HG A1C LEVEL LT 7.0%: CPT | Performed by: FAMILY MEDICINE

## 2019-01-22 PROCEDURE — G8482 FLU IMMUNIZE ORDER/ADMIN: HCPCS | Performed by: FAMILY MEDICINE

## 2019-01-22 PROCEDURE — 3017F COLORECTAL CA SCREEN DOC REV: CPT | Performed by: FAMILY MEDICINE

## 2019-01-22 PROCEDURE — 2022F DILAT RTA XM EVC RTNOPTHY: CPT | Performed by: FAMILY MEDICINE

## 2019-01-22 PROCEDURE — 83036 HEMOGLOBIN GLYCOSYLATED A1C: CPT | Performed by: FAMILY MEDICINE

## 2019-01-22 PROCEDURE — 4004F PT TOBACCO SCREEN RCVD TLK: CPT | Performed by: FAMILY MEDICINE

## 2019-01-22 RX ORDER — HYDROCODONE BITARTRATE AND ACETAMINOPHEN 5; 325 MG/1; MG/1
TABLET ORAL
COMMUNITY
End: 2019-07-26 | Stop reason: ALTCHOICE

## 2019-01-27 ASSESSMENT — ENCOUNTER SYMPTOMS
ABDOMINAL PAIN: 0
CHEST TIGHTNESS: 0
SHORTNESS OF BREATH: 0
WHEEZING: 0
SORE THROAT: 0
ABDOMINAL DISTENTION: 0

## 2019-03-01 ENCOUNTER — OFFICE VISIT (OUTPATIENT)
Dept: CARDIOLOGY CLINIC | Age: 54
End: 2019-03-01
Payer: MEDICARE

## 2019-03-01 VITALS
SYSTOLIC BLOOD PRESSURE: 124 MMHG | DIASTOLIC BLOOD PRESSURE: 80 MMHG | BODY MASS INDEX: 60.18 KG/M2 | WEIGHT: 315 LBS | HEART RATE: 74 BPM

## 2019-03-01 DIAGNOSIS — E78.5 HYPERLIPIDEMIA, UNSPECIFIED HYPERLIPIDEMIA TYPE: ICD-10-CM

## 2019-03-01 DIAGNOSIS — E11.8 TYPE 2 DIABETES MELLITUS WITH COMPLICATION, WITHOUT LONG-TERM CURRENT USE OF INSULIN (HCC): ICD-10-CM

## 2019-03-01 DIAGNOSIS — F17.210 CIGARETTE SMOKER: ICD-10-CM

## 2019-03-01 DIAGNOSIS — I87.2 VENOUS INSUFFICIENCY (CHRONIC) (PERIPHERAL): ICD-10-CM

## 2019-03-01 DIAGNOSIS — I10 ESSENTIAL HYPERTENSION: Primary | ICD-10-CM

## 2019-03-01 DIAGNOSIS — E66.01 MORBID OBESITY (HCC): ICD-10-CM

## 2019-03-01 DIAGNOSIS — G47.33 OBSTRUCTIVE SLEEP APNEA: ICD-10-CM

## 2019-03-01 PROCEDURE — G8417 CALC BMI ABV UP PARAM F/U: HCPCS | Performed by: INTERNAL MEDICINE

## 2019-03-01 PROCEDURE — G8427 DOCREV CUR MEDS BY ELIG CLIN: HCPCS | Performed by: INTERNAL MEDICINE

## 2019-03-01 PROCEDURE — 3017F COLORECTAL CA SCREEN DOC REV: CPT | Performed by: INTERNAL MEDICINE

## 2019-03-01 PROCEDURE — 4004F PT TOBACCO SCREEN RCVD TLK: CPT | Performed by: INTERNAL MEDICINE

## 2019-03-01 PROCEDURE — 99213 OFFICE O/P EST LOW 20 MIN: CPT | Performed by: INTERNAL MEDICINE

## 2019-03-01 PROCEDURE — 3044F HG A1C LEVEL LT 7.0%: CPT | Performed by: INTERNAL MEDICINE

## 2019-03-01 PROCEDURE — 2022F DILAT RTA XM EVC RTNOPTHY: CPT | Performed by: INTERNAL MEDICINE

## 2019-03-01 PROCEDURE — G8482 FLU IMMUNIZE ORDER/ADMIN: HCPCS | Performed by: INTERNAL MEDICINE

## 2019-04-12 DIAGNOSIS — I10 ESSENTIAL HYPERTENSION: ICD-10-CM

## 2019-04-12 DIAGNOSIS — E78.2 MIXED HYPERLIPIDEMIA: ICD-10-CM

## 2019-04-12 RX ORDER — LISINOPRIL AND HYDROCHLOROTHIAZIDE 25; 20 MG/1; MG/1
TABLET ORAL
Qty: 30 TABLET | Refills: 5 | Status: SHIPPED | OUTPATIENT
Start: 2019-04-12 | End: 2019-10-14 | Stop reason: SDUPTHER

## 2019-04-12 RX ORDER — ATORVASTATIN CALCIUM 40 MG/1
TABLET, FILM COATED ORAL
Qty: 30 TABLET | Refills: 5 | Status: SHIPPED | OUTPATIENT
Start: 2019-04-12 | End: 2019-10-14 | Stop reason: SDUPTHER

## 2019-04-12 NOTE — TELEPHONE ENCOUNTER
Theresa Sanz is calling to request a refill on the following medication(s):  Requested Prescriptions     Pending Prescriptions Disp Refills    lisinopril-hydrochlorothiazide (PRINZIDE;ZESTORETIC) 20-25 MG per tablet [Pharmacy Med Name: LISINOPRIL/HCTZ 20-25MG TAB] 30 tablet 5     Sig: TAKE 1 TABLET BY MOUTH ONCE DAILY    atorvastatin (LIPITOR) 40 MG tablet [Pharmacy Med Name: ATORVASTATIN 40MG   TAB] 30 tablet 5     Sig: TAKE 1 TABLET BY MOUTH ONCE DAILY       Last Visit Date (If Applicable):  4/58/1606    Next Visit Date:    7/24/2019

## 2019-05-25 DIAGNOSIS — M19.90 ARTHRITIS: ICD-10-CM

## 2019-05-26 NOTE — TELEPHONE ENCOUNTER
Marge Caballero is calling to request a refill on the following medication(s):  Requested Prescriptions     Pending Prescriptions Disp Refills    meloxicam (MOBIC) 15 MG tablet [Pharmacy Med Name: MELOXICAM 15MG      TAB] 30 tablet 5     Sig: TAKE 1 TABLET BY MOUTH ONCE DAILY       Last Visit Date (If Applicable):  1/49/7660    Next Visit Date:    7/24/2019

## 2019-05-28 RX ORDER — MELOXICAM 15 MG/1
TABLET ORAL
Qty: 30 TABLET | Refills: 5 | Status: SHIPPED | OUTPATIENT
Start: 2019-05-28 | End: 2019-12-23

## 2019-07-26 ENCOUNTER — OFFICE VISIT (OUTPATIENT)
Dept: FAMILY MEDICINE CLINIC | Age: 54
End: 2019-07-26
Payer: MEDICARE

## 2019-07-26 VITALS
SYSTOLIC BLOOD PRESSURE: 124 MMHG | OXYGEN SATURATION: 98 % | BODY MASS INDEX: 59.98 KG/M2 | DIASTOLIC BLOOD PRESSURE: 80 MMHG | HEART RATE: 87 BPM | WEIGHT: 315 LBS

## 2019-07-26 DIAGNOSIS — F17.210 CIGARETTE SMOKER: ICD-10-CM

## 2019-07-26 DIAGNOSIS — E11.8 TYPE 2 DIABETES MELLITUS WITH COMPLICATION, UNSPECIFIED WHETHER LONG TERM INSULIN USE: ICD-10-CM

## 2019-07-26 DIAGNOSIS — E78.2 MIXED HYPERLIPIDEMIA: ICD-10-CM

## 2019-07-26 DIAGNOSIS — E66.01 MORBID OBESITY WITH BMI OF 50.0-59.9, ADULT (HCC): ICD-10-CM

## 2019-07-26 DIAGNOSIS — G47.33 OBSTRUCTIVE SLEEP APNEA: ICD-10-CM

## 2019-07-26 DIAGNOSIS — I10 ESSENTIAL HYPERTENSION: Primary | ICD-10-CM

## 2019-07-26 LAB — HBA1C MFR BLD: 6.1 %

## 2019-07-26 PROCEDURE — 2022F DILAT RTA XM EVC RTNOPTHY: CPT | Performed by: FAMILY MEDICINE

## 2019-07-26 PROCEDURE — G8427 DOCREV CUR MEDS BY ELIG CLIN: HCPCS | Performed by: FAMILY MEDICINE

## 2019-07-26 PROCEDURE — 99214 OFFICE O/P EST MOD 30 MIN: CPT | Performed by: FAMILY MEDICINE

## 2019-07-26 PROCEDURE — 83036 HEMOGLOBIN GLYCOSYLATED A1C: CPT | Performed by: FAMILY MEDICINE

## 2019-07-26 PROCEDURE — G8417 CALC BMI ABV UP PARAM F/U: HCPCS | Performed by: FAMILY MEDICINE

## 2019-07-26 PROCEDURE — 4004F PT TOBACCO SCREEN RCVD TLK: CPT | Performed by: FAMILY MEDICINE

## 2019-07-26 PROCEDURE — 3044F HG A1C LEVEL LT 7.0%: CPT | Performed by: FAMILY MEDICINE

## 2019-07-26 PROCEDURE — 3017F COLORECTAL CA SCREEN DOC REV: CPT | Performed by: FAMILY MEDICINE

## 2019-07-26 RX ORDER — MULTIVITAMIN
TABLET ORAL
COMMUNITY

## 2019-07-26 ASSESSMENT — ENCOUNTER SYMPTOMS
WHEEZING: 0
SORE THROAT: 0
ABDOMINAL PAIN: 0
ABDOMINAL DISTENTION: 0
CHEST TIGHTNESS: 0
SHORTNESS OF BREATH: 0

## 2019-07-26 ASSESSMENT — PATIENT HEALTH QUESTIONNAIRE - PHQ9
SUM OF ALL RESPONSES TO PHQ QUESTIONS 1-9: 0
2. FEELING DOWN, DEPRESSED OR HOPELESS: 0
SUM OF ALL RESPONSES TO PHQ9 QUESTIONS 1 & 2: 0
1. LITTLE INTEREST OR PLEASURE IN DOING THINGS: 0
SUM OF ALL RESPONSES TO PHQ QUESTIONS 1-9: 0

## 2019-07-26 NOTE — PROGRESS NOTES
[address] [id number] [address]  Dept: [phone]  DeptFax: [phone]    Karina Almonte is a50 y.o. male who presents today for his medical conditions/complaints as noted below. Karina Almonte is c/o of 6 Month Follow-Up; Diabetes; and Other (wounds on arms/legs/head not healing)      HPI:     HPI     Patient is here for routine six-month checkup    Hypertension  He does check his blood pressures. Though he does have a machine at home to do so. His blood pressure for us is 124/80 . He is on lisinopril-hydrochlorothiazide. He has no chest pain or shortness of breath. He does have some edema secondary to his obesity.      Diabetes  He does not check his sugars. He has no polyuria, polyphagia or polydipsia. His cholesterol is treated with Lipitor. He is on lisinopril as his blood pressure medicine. He takes Jardiance 10 mg daily. His HA1c today is  6.1     Hyperlipidemia  He remains on atorvastatin 40 mg daily. He has no side effects. No increasing myalgias or arthralgias. No pruritus. No jaundice. His last lipid panel was in April of last year with a total cholesterol of 204, HDL 33 and . He has not yet been repeated this year. However will be repeated today possible     Coronary artery disease   He does follow with Cardiology. He had an equivocal stress test prior to his surgery with the suggestion of perhaps some inferior ischemia though his echocardiogram was normal with an ejection fraction of 55%. As noted below he has no chest pain. No chest pressure. His cholesterol is treated though we would like to see his LDL a little lower. He follows up with Cardiology every 6 months. Last seen by Dr Kimberly Hackett in jan/feb 2019. Will go back to see him in August     TTE 4/24/17 : LVEF 55%.  Mild to moderate LVH, Mild TR, RVSP 41 mmHg     Stress 11/27/18  Fixed inferior defect  Probable mild distal anteroapical reversible defect suggestive tablet by mouth daily 30 tablet 5    Handicap Placard MISC by Does not apply route      CPAP Machine MISC by Does not apply route      aspirin 325 MG tablet Take 325 mg by mouth daily       No current facility-administered medications for this visit. No Known Allergies    Health Maintenance   Topic Date Due    Hepatitis C screen  1965    Pneumococcal 0-64 years Vaccine (1 of 1 - PPSV23) 06/29/1971    Diabetic retinal exam  06/29/1975    Hepatitis B Vaccine (1 of 3 - Risk 3-dose series) 06/29/1984    Shingles Vaccine (1 of 2) 06/29/2015    Colon cancer screen colonoscopy  06/29/2015    DTaP/Tdap/Td vaccine (2 - Td) 07/13/2017    Lipid screen  07/23/2019    HIV screen  10/17/2027 (Originally 6/29/1980)    Flu vaccine (1) 09/01/2019    Diabetic foot exam  10/29/2019    Diabetic microalbuminuria test  10/29/2019    Potassium monitoring  12/07/2019    Creatinine monitoring  12/07/2019    A1C test (Diabetic or Prediabetic)  01/22/2020       Lab Results   Component Value Date    K 4.4 12/07/2018    CREATININE 0.82 12/07/2018    AST 26 07/23/2018    ALT 48 07/23/2018    HCT 45.1 12/07/2018    LABA1C 6.1 07/26/2019    GLUCOSE 124 12/07/2018    CALCIUM 9.7 12/07/2018      Lab Results   Component Value Date    CHOL 148 07/23/2018    CHOL 143 09/30/2016    TRIG 212 07/23/2018    HDL 32 09/30/2016       Subjective:      Review of Systems   Constitutional: Negative for activity change, chills, diaphoresis and fever. HENT: Negative. Negative for sore throat. Respiratory: Negative for chest tightness, shortness of breath and wheezing. Cardiovascular: Negative for chest pain, palpitations and leg swelling. Gastrointestinal: Negative for abdominal distention and abdominal pain. Endocrine: Negative for polydipsia, polyphagia and polyuria. Genitourinary: Negative for difficulty urinating, dysuria and hematuria. Musculoskeletal: Positive for arthralgias and gait problem.  Negative for joint swelling. Skin: Positive for wound (bilateral upper extremities ). Negative for rash. Hematological: Negative for adenopathy. Objective:     /80 (Site: Left Upper Arm, Position: Sitting)   Pulse 87   Wt (!) 418 lb (189.6 kg)   SpO2 98%   BMI 59.98 kg/m²     Physical Exam   Constitutional: He appears well-developed and well-nourished. No distress. morbid obesity    HENT:   Right Ear: Tympanic membrane normal.   Left Ear: Tympanic membrane normal.   Mouth/Throat: Oropharynx is clear and moist and mucous membranes are normal.   Constricted oropharynx   Neck: Neck supple. Carotid bruit is not present. No thyromegaly present. Cardiovascular: Regular rhythm, S1 normal and S2 normal.   No murmur heard. Pulmonary/Chest: He has decreased breath sounds. He has no wheezes. He has no rhonchi. He has no rales. Abdominal: Soft. Bowel sounds are normal. There is no hepatosplenomegaly. There is no tenderness. Musculoskeletal: He exhibits edema (trace bilateral lower extremities ). Lymphadenopathy:     He has no cervical adenopathy. He has no axillary adenopathy. Skin: Lesion noted. No rash noted. A few small eschars of the dorsal forearms. but no lesions of the lower extremities   Vitals reviewed. Assessment:      Diagnosis Orders   1. Essential hypertension     2. Mixed hyperlipidemia     3. Type 2 diabetes mellitus with complication, unspecified whether long term insulin use (HCC)  POCT glycosylated hemoglobin (Hb A1C)   4. Obstructive sleep apnea     5. Cigarette smoker     6. Morbid obesity with BMI of 50.0-59.9, adult [facility])  [place]ron [place], [place], Bariatric Surgery, [place]            POC Testing Results (If Applicable):  Results for POC orders placed in visit on 07/26/19   POCT glycosylated hemoglobin (Hb A1C)   Result Value Ref Range    Hemoglobin A1C 6.1 %       Plan:   Continue his current medications. He needs to continue his efforts to quit smoking.   Follow-up with pulmonology and cardiology. However I will proceed with referral to Dr. Navya Anthony her bariatric surgeon to consider weight loss surgery. Without which I doubt he be very successful weight loss. But if successful could \"cure \"diabetes hypertension obstructive sleep apnea and his arthritis. They are agreeable. Recheck in 6 months sooner if any problems    Incidentally his labs orders were reprinted and he should do them on a fasting basis is as possible      Orders Given:  Orders Placed This Encounter   Procedures   [address], DO, Bariatric Surgery, [place]     Referral Priority:   Routine     Referral Type:   Eval and Treat     Referral Reason:   Specialty Services Required     Referred to Provider:   Regina Mckee DO     Requested Specialty:   Bariatric Surgery     Number of Visits Requested:   1    POCT glycosylated hemoglobin (Hb A1C)     Prescriptions:    No orders of the defined types were placed in this encounter. Return in about 6 months (around 1/26/2020). Electronically signed by Griselda Waters MD on7/26/2019. **This report has been created using voice recognition software. It may contain minor errors which are inherent in voice recognition technology. **

## 2019-07-27 ENCOUNTER — OFFICE VISIT (OUTPATIENT)
Dept: FAMILY MEDICINE CLINIC | Age: 54
End: 2019-07-27
Payer: MEDICARE

## 2019-07-27 VITALS
OXYGEN SATURATION: 98 % | BODY MASS INDEX: 45.1 KG/M2 | HEART RATE: 71 BPM | TEMPERATURE: 98.8 F | WEIGHT: 315 LBS | HEIGHT: 70 IN | DIASTOLIC BLOOD PRESSURE: 68 MMHG | SYSTOLIC BLOOD PRESSURE: 130 MMHG

## 2019-07-27 DIAGNOSIS — L03.811 CELLULITIS OF SCALP: Primary | ICD-10-CM

## 2019-07-27 DIAGNOSIS — R51.9 POSTAURICULAR PAIN: ICD-10-CM

## 2019-07-27 DIAGNOSIS — H60.502 ACUTE OTITIS EXTERNA OF LEFT EAR, UNSPECIFIED TYPE: ICD-10-CM

## 2019-07-27 PROCEDURE — 99203 OFFICE O/P NEW LOW 30 MIN: CPT | Performed by: NURSE PRACTITIONER

## 2019-07-27 PROCEDURE — 4004F PT TOBACCO SCREEN RCVD TLK: CPT | Performed by: NURSE PRACTITIONER

## 2019-07-27 PROCEDURE — 4130F TOPICAL PREP RX AOE: CPT | Performed by: NURSE PRACTITIONER

## 2019-07-27 PROCEDURE — G8417 CALC BMI ABV UP PARAM F/U: HCPCS | Performed by: NURSE PRACTITIONER

## 2019-07-27 PROCEDURE — 3017F COLORECTAL CA SCREEN DOC REV: CPT | Performed by: NURSE PRACTITIONER

## 2019-07-27 PROCEDURE — G8427 DOCREV CUR MEDS BY ELIG CLIN: HCPCS | Performed by: NURSE PRACTITIONER

## 2019-07-27 RX ORDER — CEPHALEXIN 500 MG/1
500 CAPSULE ORAL 3 TIMES DAILY
Qty: 30 CAPSULE | Refills: 0 | Status: SHIPPED | OUTPATIENT
Start: 2019-07-27 | End: 2019-11-25 | Stop reason: ALTCHOICE

## 2019-07-27 NOTE — PROGRESS NOTES
Past Medical History:   Diagnosis Date    Hyperlipidemia     Hypertriglyceridemia     Obesity     Tobacco abuse        Social History     Tobacco Use    Smoking status: Current Every Day Smoker     Packs/day: 0.70     Types: Cigarettes    Smokeless tobacco: Never Used   Substance Use Topics    Alcohol use: No    Drug use: Not on file       Significant family and surgical history reviewed as noted in the patient's record. Objective:    Physical Exam:  Vitals: /68   Pulse 71   Temp 98.8 °F (37.1 °C)   Ht 5' 10\" (1.778 m)   Wt (!) 420 lb (190.5 kg)   SpO2 98%   BMI 60.26 kg/m²     LABS:  CBC:  No results for input(s): WBC, HGB, PLT in the last 72 hours. BMP:  No results for input(s): NA, K, CL, CO2, BUN, CREATININE, GLUCOSE in the last 72 hours. Hepatic:  No results for input(s): AST, ALT, ALB, BILITOT, ALKPHOS in the last 72 hours. Pertinent lab and radiology results reviewed.        General Appearance: well developed, well nourished, and in no acute distress  Skin: warm and dry, no rash, no erythema-- with palpable area (left postauricular on scalp) of fluctuance, approximately 5 cm by 4 cm in size with mildly excessive warmth without erythema and with skin surface intact and without ecchymosis   Head: normocephalic and atraumatic  Eyes: pupils equal, round, and reactive to light, sclerae white, conjunctivae normal  ENT: left tympanic membrane normal, right tympanic membrane normal, left external ear normal, right external ear normal, left ear canal normal (except bright erythema and edema noted in the posterior canal side wall), right ear canal normal, nose without deformity, nasal mucosa and turbinates normal, pharynx normal, sinuses non-tender  Neck: supple and non-tender without mass, no thyromegaly or thyroid nodules, no cervical lymphadenopathy  Pulmonary/Chest: diminished throughout with mild expiratory wheezing bilaterally, no rales or rhonchi, no respiratory

## 2019-07-29 LAB
A/G RATIO: 1.2 RATIO
AGE FOR GFR: 54
ALBUMIN: 3.9 G/DL (ref 3.5–5)
ALK PHOSPHATASE: 96 UNITS/L (ref 38–126)
ALT SERPL-CCNC: 27 UNITS/L (ref 21–72)
ANION GAP SERPL CALCULATED.3IONS-SCNC: 11 MMOL/L
AST SERPL-CCNC: 28 UNITS/L (ref 17–59)
BASOPHILS # BLD: 0.08 THOU/MM3 (ref 0–0.3)
BILIRUB SERPL-MCNC: 0.5 MG/DL (ref 0.2–1.3)
BUN BLDV-MCNC: 13 MG/DL (ref 9–20)
CALCIUM SERPL-MCNC: 9.3 MG/DL (ref 8.4–10.2)
CHLORIDE BLD-SCNC: 105 MMOL/L (ref 98–120)
CHOLESTEROL/HDL RATIO: 4.9 RATIO (ref 0–4.5)
CHOLESTEROL: 127 MG/DL (ref 50–200)
CO2: 28 MMOL/L (ref 22–31)
CREAT SERPL-MCNC: 0.8 MG/DL (ref 0.7–1.3)
CREATININE, RANDOM: 218.1 MG/DL (ref 20–370)
DIFFERENTIAL: AUTOMATED DIFF
EGFR BF: 90 ML/MIN/1.73 M2
EGFR BM: 122 ML/MIN/1.73 M2
EGFR WF: 75 ML/MIN/1.73 M2
EGFR WM: 101 ML/MIN/1.73 M2
EOSINOPHIL # BLD: 0.26 THOU/MM3 (ref 0–1.1)
GLOBULIN: 3.2 G/DL
GLUCOSE: 112 MG/DL (ref 75–110)
HCT VFR BLD CALC: 41.5 % (ref 42–52)
HDL, DIRECT: 26 MG/DL (ref 36–68)
HEMOGLOBIN: 13.3 G/DL (ref 13.8–17)
LDL CHOLESTEROL CALCULATED: 65.6 MG/DL (ref 0–160)
LYMPHOCYTES # BLD: 2.44 THOU/MM3 (ref 1–5.5)
MCH RBC QN AUTO: 29 PG (ref 28.5–32)
MCHC RBC AUTO-ENTMCNC: 31.9 G/DL (ref 32–37)
MCV RBC AUTO: 90.8 FL (ref 80–94)
MICROALBUMIN UR-MCNC: 0.7 MG/DL (ref 0–1.7)
MICROALBUMIN/CREAT UR-RTO: 3.2 MCG/MG CR
MONOCYTES # BLD: 0.73 THOU/MM3 (ref 0.1–1)
NEUTROPHILS: 3.19 THOU/MM3 (ref 2–8.1)
PDW BLD-RTO: 13.6 % (ref 10–15)
PLATELET # BLD: 190 THOU/MM3 (ref 130–400)
PMV BLD AUTO: 6.7 FL (ref 7.4–11)
POTASSIUM SERPL-SCNC: 3.8 MMOL/L (ref 3.6–5)
RBC # BLD: 4.57 M/UL (ref 4.7–6.1)
SODIUM BLD-SCNC: 140 MMOL/L (ref 135–145)
TOTAL PROTEIN: 7.1 G/DL (ref 6.3–8.2)
TRIGL SERPL-MCNC: 177 MG/DL (ref 10–250)
VLDLC SERPL CALC-MCNC: 35 MG/DL (ref 0–40)
WBC # BLD: 6.7 THOU/ML3 (ref 4.8–10)

## 2019-08-02 ENCOUNTER — OFFICE VISIT (OUTPATIENT)
Dept: CARDIOLOGY CLINIC | Age: 54
End: 2019-08-02
Payer: MEDICARE

## 2019-08-02 VITALS
HEIGHT: 72 IN | BODY MASS INDEX: 42.66 KG/M2 | WEIGHT: 315 LBS | DIASTOLIC BLOOD PRESSURE: 66 MMHG | SYSTOLIC BLOOD PRESSURE: 110 MMHG | HEART RATE: 80 BPM

## 2019-08-02 DIAGNOSIS — I10 ESSENTIAL HYPERTENSION: Primary | ICD-10-CM

## 2019-08-02 PROCEDURE — 99214 OFFICE O/P EST MOD 30 MIN: CPT | Performed by: INTERNAL MEDICINE

## 2019-08-02 PROCEDURE — 3017F COLORECTAL CA SCREEN DOC REV: CPT | Performed by: INTERNAL MEDICINE

## 2019-08-02 PROCEDURE — G8427 DOCREV CUR MEDS BY ELIG CLIN: HCPCS | Performed by: INTERNAL MEDICINE

## 2019-08-02 PROCEDURE — 4004F PT TOBACCO SCREEN RCVD TLK: CPT | Performed by: INTERNAL MEDICINE

## 2019-08-02 PROCEDURE — G8417 CALC BMI ABV UP PARAM F/U: HCPCS | Performed by: INTERNAL MEDICINE

## 2019-08-02 NOTE — PROGRESS NOTES
Today's Date: 8/2/2019  Patient Name: Matt Hall  Patient's age: 47 y.o., 1965          The patient is a 47 y.o.  male is in the office for f/u, Patient has been doing well cardiac wise, no new cardiac complaints. He denies angina, PND/Orthopnea. Past Medical History:   has a past medical history of Hyperlipidemia, Hypertriglyceridemia, Obesity, and Tobacco abuse. Past Surgical History:   has a past surgical history that includes Abscess Drainage (08/2014) and Total hip arthroplasty (Right, 12/2018). Home Medications:    Prior to Admission medications    Medication Sig Start Date End Date Taking? Authorizing Provider   Acetaminophen (TYLENOL ARTHRITIS PAIN PO) Take 1 tablet by mouth as needed   Yes Historical Provider, MD   cephALEXin (KEFLEX) 500 MG capsule Take 1 capsule by mouth 3 times daily 7/27/19  Yes IONA Wiggins - CNP   Multiple Vitamin (MULTI-VITAMIN DAILY) TABS Take by mouth   Yes Historical Provider, MD   meloxicam (MOBIC) 15 MG tablet TAKE 1 TABLET BY MOUTH ONCE DAILY 5/28/19  Yes Becca Greene MD   lisinopril-hydrochlorothiazide (PRINZIDE;ZESTORETIC) 20-25 MG per tablet TAKE 1 TABLET BY MOUTH ONCE DAILY 4/12/19  Yes Becca Greene MD   atorvastatin (LIPITOR) 40 MG tablet TAKE 1 TABLET BY MOUTH ONCE DAILY 4/12/19  Yes Becca Greene MD   buPROPion [facility] SR) 150 MG extended release tablet Take 150 mg by mouth daily   Yes Historical Provider, MD   empagliflozin (JARDIANCE) 10 MG tablet Take 1 tablet by mouth daily 7/24/18  Yes Becca Greene MD   CPAP Machine MISC by Does not apply route   Yes Historical Provider, MD   aspirin 325 MG tablet Take 325 mg by mouth daily   Yes Historical Provider, MD   Handicap Placard MISC by Does not apply route    Historical Provider, MD       Allergies:  Patient has no known allergies. Social History:   reports that he has been smoking cigarettes. He has been smoking about 0.37 packs per day.  He has

## 2019-10-14 DIAGNOSIS — I10 ESSENTIAL HYPERTENSION: ICD-10-CM

## 2019-10-14 DIAGNOSIS — E78.2 MIXED HYPERLIPIDEMIA: ICD-10-CM

## 2019-10-15 RX ORDER — LISINOPRIL AND HYDROCHLOROTHIAZIDE 25; 20 MG/1; MG/1
TABLET ORAL
Qty: 90 TABLET | Refills: 1 | Status: SHIPPED | OUTPATIENT
Start: 2019-10-15 | End: 2020-05-26

## 2019-10-15 RX ORDER — ATORVASTATIN CALCIUM 40 MG/1
TABLET, FILM COATED ORAL
Qty: 90 TABLET | Refills: 1 | Status: SHIPPED | OUTPATIENT
Start: 2019-10-15 | End: 2020-05-26

## 2019-11-25 ENCOUNTER — OFFICE VISIT (OUTPATIENT)
Dept: FAMILY MEDICINE CLINIC | Age: 54
End: 2019-11-25
Payer: MEDICARE

## 2019-11-25 VITALS
HEART RATE: 88 BPM | BODY MASS INDEX: 44.1 KG/M2 | SYSTOLIC BLOOD PRESSURE: 110 MMHG | OXYGEN SATURATION: 98 % | WEIGHT: 315 LBS | DIASTOLIC BLOOD PRESSURE: 60 MMHG | HEIGHT: 71 IN | TEMPERATURE: 98.4 F

## 2019-11-25 DIAGNOSIS — H61.23 BILATERAL IMPACTED CERUMEN: ICD-10-CM

## 2019-11-25 DIAGNOSIS — L01.00 IMPETIGO: ICD-10-CM

## 2019-11-25 DIAGNOSIS — H60.391 OTITIS, EXTERNA, INFECTIVE, RIGHT: Primary | ICD-10-CM

## 2019-11-25 PROCEDURE — 69209 REMOVE IMPACTED EAR WAX UNI: CPT | Performed by: NURSE PRACTITIONER

## 2019-11-25 PROCEDURE — G8484 FLU IMMUNIZE NO ADMIN: HCPCS | Performed by: NURSE PRACTITIONER

## 2019-11-25 PROCEDURE — 4130F TOPICAL PREP RX AOE: CPT | Performed by: NURSE PRACTITIONER

## 2019-11-25 PROCEDURE — G8427 DOCREV CUR MEDS BY ELIG CLIN: HCPCS | Performed by: NURSE PRACTITIONER

## 2019-11-25 PROCEDURE — 99214 OFFICE O/P EST MOD 30 MIN: CPT | Performed by: NURSE PRACTITIONER

## 2019-11-25 PROCEDURE — G8417 CALC BMI ABV UP PARAM F/U: HCPCS | Performed by: NURSE PRACTITIONER

## 2019-11-25 PROCEDURE — 3017F COLORECTAL CA SCREEN DOC REV: CPT | Performed by: NURSE PRACTITIONER

## 2019-11-25 PROCEDURE — 4004F PT TOBACCO SCREEN RCVD TLK: CPT | Performed by: NURSE PRACTITIONER

## 2019-11-25 RX ORDER — CIPROFLOXACIN HYDROCHLORIDE 3.5 MG/ML
4 SOLUTION/ DROPS TOPICAL 2 TIMES DAILY
Qty: 80 DROP | Refills: 0 | Status: SHIPPED | OUTPATIENT
Start: 2019-11-25 | End: 2019-12-05

## 2020-01-28 ENCOUNTER — OFFICE VISIT (OUTPATIENT)
Dept: FAMILY MEDICINE CLINIC | Age: 55
End: 2020-01-28
Payer: MEDICARE

## 2020-01-28 ENCOUNTER — HOSPITAL ENCOUNTER (OUTPATIENT)
Age: 55
Setting detail: SPECIMEN
Discharge: HOME | End: 2020-01-28
Payer: MEDICARE

## 2020-01-28 VITALS
BODY MASS INDEX: 60.38 KG/M2 | WEIGHT: 315 LBS | OXYGEN SATURATION: 100 % | HEART RATE: 96 BPM | DIASTOLIC BLOOD PRESSURE: 56 MMHG | SYSTOLIC BLOOD PRESSURE: 110 MMHG

## 2020-01-28 LAB
HBA1C MFR BLD: 6 %
HEPATITIS C ANTIBODY: NONREACTIVE

## 2020-01-28 PROCEDURE — 99214 OFFICE O/P EST MOD 30 MIN: CPT | Performed by: FAMILY MEDICINE

## 2020-01-28 PROCEDURE — 3044F HG A1C LEVEL LT 7.0%: CPT | Performed by: FAMILY MEDICINE

## 2020-01-28 PROCEDURE — 36415 COLL VENOUS BLD VENIPUNCTURE: CPT

## 2020-01-28 PROCEDURE — 4004F PT TOBACCO SCREEN RCVD TLK: CPT | Performed by: FAMILY MEDICINE

## 2020-01-28 PROCEDURE — G8417 CALC BMI ABV UP PARAM F/U: HCPCS | Performed by: FAMILY MEDICINE

## 2020-01-28 PROCEDURE — G8427 DOCREV CUR MEDS BY ELIG CLIN: HCPCS | Performed by: FAMILY MEDICINE

## 2020-01-28 PROCEDURE — 2022F DILAT RTA XM EVC RTNOPTHY: CPT | Performed by: FAMILY MEDICINE

## 2020-01-28 PROCEDURE — G8484 FLU IMMUNIZE NO ADMIN: HCPCS | Performed by: FAMILY MEDICINE

## 2020-01-28 PROCEDURE — 3017F COLORECTAL CA SCREEN DOC REV: CPT | Performed by: FAMILY MEDICINE

## 2020-01-28 PROCEDURE — 86803 HEPATITIS C AB TEST: CPT

## 2020-01-28 PROCEDURE — 83036 HEMOGLOBIN GLYCOSYLATED A1C: CPT | Performed by: FAMILY MEDICINE

## 2020-01-28 PROCEDURE — 99214 OFFICE O/P EST MOD 30 MIN: CPT

## 2020-01-28 RX ORDER — APIXABAN 2.5 MG/1
TABLET, FILM COATED ORAL
COMMUNITY
Start: 2019-12-17 | End: 2020-07-16

## 2020-01-28 RX ORDER — HYDROCODONE BITARTRATE AND ACETAMINOPHEN 5; 325 MG/1; MG/1
TABLET ORAL
COMMUNITY
End: 2020-01-28 | Stop reason: ALTCHOICE

## 2020-01-28 RX ORDER — DOCUSATE SODIUM 100 MG/1
CAPSULE, LIQUID FILLED ORAL
COMMUNITY
Start: 2019-12-17

## 2020-01-28 ASSESSMENT — ENCOUNTER SYMPTOMS
ABDOMINAL PAIN: 0
WHEEZING: 0
SHORTNESS OF BREATH: 0
SORE THROAT: 0
CHEST TIGHTNESS: 0
ABDOMINAL DISTENTION: 0

## 2020-01-28 ASSESSMENT — PATIENT HEALTH QUESTIONNAIRE - PHQ9
SUM OF ALL RESPONSES TO PHQ QUESTIONS 1-9: 0
2. FEELING DOWN, DEPRESSED OR HOPELESS: 0
1. LITTLE INTEREST OR PLEASURE IN DOING THINGS: 0
SUM OF ALL RESPONSES TO PHQ9 QUESTIONS 1 & 2: 0
SUM OF ALL RESPONSES TO PHQ QUESTIONS 1-9: 0

## 2020-01-28 NOTE — PROGRESS NOTES
 ELIQUIS 2.5 MG TABS tablet TAKE ONE TABLET BY MOUTH TWICE DAILY FOR 21 DAYS      docusate sodium (COLACE) 100 MG capsule TAKE ONE CAPSULE BY MOUTH TWICE DAILY      mupirocin (BACTROBAN) 2 % ointment APPLY A SMALL AMOUNT TO EACH NOSTRIL TWICE DAILY STARTING 5 DAYS PRIOR TO SURGERY  0    meloxicam (MOBIC) 15 MG tablet TAKE 1 TABLET BY MOUTH ONCE DAILY 30 tablet 0    atorvastatin (LIPITOR) 40 MG tablet TAKE 1 TABLET BY MOUTH ONCE DAILY 90 tablet 1    lisinopril-hydrochlorothiazide (PRINZIDE;ZESTORETIC) 20-25 MG per tablet TAKE 1 TABLET BY MOUTH ONCE DAILY 90 tablet 1    Acetaminophen (TYLENOL ARTHRITIS PAIN PO) Take 1 tablet by mouth as needed      Multiple Vitamin (MULTI-VITAMIN DAILY) TABS Take by mouth      buPROPion (WELLBUTRIN SR) 150 MG extended release tablet Take 150 mg by mouth daily      empagliflozin (JARDIANCE) 10 MG tablet Take 1 tablet by mouth daily 30 tablet 5    Handicap Placard MISC by Does not apply route      CPAP Machine MISC by Does not apply route      aspirin 325 MG tablet Take 325 mg by mouth daily       No current facility-administered medications for this visit.       No Known Allergies    Health Maintenance   Topic Date Due    Hepatitis C screen  1965    Pneumococcal 0-64 years Vaccine (1 of 1 - PPSV23) 06/29/1971    Diabetic retinal exam  06/29/1975    Hepatitis B vaccine (1 of 3 - Risk 3-dose series) 06/29/1984    Shingles Vaccine (1 of 2) 06/29/2015    Colon cancer screen colonoscopy  06/29/2015    DTaP/Tdap/Td vaccine (2 - Td) 07/13/2017    Annual Wellness Visit (AWV)  05/29/2019    Flu vaccine (1) 09/01/2019    Diabetic foot exam  10/29/2019    HIV screen  10/17/2027 (Originally 6/29/1980)    Diabetic microalbuminuria test  07/29/2020    Lipid screen  07/29/2020    Potassium monitoring  10/10/2020    Creatinine monitoring  10/10/2020    A1C test (Diabetic or Prediabetic)  10/22/2020       Lab Results   Component Value Date    K 4.1 10/10/2019 CREATININE 0.79 10/10/2019    AST 28 07/29/2019    ALT 27 07/29/2019    HCT 42.4 10/22/2019    LABA1C 6.0 01/28/2020    GLUCOSE 104 10/10/2019    CALCIUM 9.7 10/10/2019      Lab Results   Component Value Date    CHOL 127 07/29/2019    CHOL 143 09/30/2016    TRIG 177 07/29/2019    HDL 32 09/30/2016       Subjective:      Review of Systems   Constitutional: Negative for activity change, chills, diaphoresis and fever. HENT: Negative. Negative for sore throat. Respiratory: Negative for chest tightness, shortness of breath and wheezing. Cardiovascular: Negative for chest pain, palpitations and leg swelling. Gastrointestinal: Negative for abdominal distention and abdominal pain. Endocrine: Negative for polydipsia, polyphagia and polyuria. Genitourinary: Negative for difficulty urinating, dysuria and hematuria. Musculoskeletal: Positive for arthralgias and gait problem. Negative for joint swelling. Skin: Negative for rash and wound. Hematological: Negative for adenopathy. Objective:     BP (!) 110/56   Pulse 96   Wt (!) 436 lb (197.8 kg)   SpO2 100%   BMI 60.38 kg/m²     Physical Exam  Vitals signs reviewed. Constitutional:       General: He is not in acute distress. Appearance: He is well-developed. Comments:  morbid obesity    HENT:      Right Ear: Tympanic membrane normal.      Left Ear: Tympanic membrane normal.   Neck:      Musculoskeletal: Neck supple. Thyroid: No thyromegaly. Vascular: No carotid bruit. Cardiovascular:      Rate and Rhythm: Regular rhythm. Heart sounds: S1 normal and S2 normal. No murmur. Pulmonary:      Breath sounds: Decreased breath sounds present. No wheezing, rhonchi or rales. Abdominal:      General: Bowel sounds are normal.      Palpations: Abdomen is soft. Tenderness: There is no abdominal tenderness. Musculoskeletal:        Legs:    Lymphadenopathy:      Cervical: No cervical adenopathy.    Skin:     Findings: Rash recognition software. It may contain minor errors which are inherent in voice recognition technology. **

## 2020-01-29 RX ORDER — MELOXICAM 15 MG/1
TABLET ORAL
Qty: 30 TABLET | Refills: 0 | Status: SHIPPED | OUTPATIENT
Start: 2020-01-29 | End: 2020-06-25

## 2020-02-04 ENCOUNTER — OFFICE VISIT (OUTPATIENT)
Dept: CARDIOLOGY CLINIC | Age: 55
End: 2020-02-04
Payer: MEDICARE

## 2020-02-04 VITALS
BODY MASS INDEX: 60.8 KG/M2 | WEIGHT: 315 LBS | SYSTOLIC BLOOD PRESSURE: 142 MMHG | DIASTOLIC BLOOD PRESSURE: 80 MMHG | HEART RATE: 92 BPM

## 2020-02-04 PROCEDURE — G8926 SPIRO NO PERF OR DOC: HCPCS | Performed by: INTERNAL MEDICINE

## 2020-02-04 PROCEDURE — G8484 FLU IMMUNIZE NO ADMIN: HCPCS | Performed by: INTERNAL MEDICINE

## 2020-02-04 PROCEDURE — G8427 DOCREV CUR MEDS BY ELIG CLIN: HCPCS | Performed by: INTERNAL MEDICINE

## 2020-02-04 PROCEDURE — 3044F HG A1C LEVEL LT 7.0%: CPT | Performed by: INTERNAL MEDICINE

## 2020-02-04 PROCEDURE — 2022F DILAT RTA XM EVC RTNOPTHY: CPT | Performed by: INTERNAL MEDICINE

## 2020-02-04 PROCEDURE — G8417 CALC BMI ABV UP PARAM F/U: HCPCS | Performed by: INTERNAL MEDICINE

## 2020-02-04 PROCEDURE — 3017F COLORECTAL CA SCREEN DOC REV: CPT | Performed by: INTERNAL MEDICINE

## 2020-02-04 PROCEDURE — 4004F PT TOBACCO SCREEN RCVD TLK: CPT | Performed by: INTERNAL MEDICINE

## 2020-02-04 PROCEDURE — 99214 OFFICE O/P EST MOD 30 MIN: CPT | Performed by: INTERNAL MEDICINE

## 2020-02-04 PROCEDURE — 3023F SPIROM DOC REV: CPT | Performed by: INTERNAL MEDICINE

## 2020-02-04 NOTE — PROGRESS NOTES
(JARDIANCE) 10 MG tablet Take 1 tablet by mouth daily 7/24/18   Lis Lennon MD   Handicap Placard MISC by Does not apply route    Historical Provider, MD   CPAP Machine MISC by Does not apply route    Historical Provider, MD   aspirin 325 MG tablet Take 325 mg by mouth daily    Historical Provider, MD       Allergies:  Patient has no known allergies. Social History:   reports that he has been smoking cigarettes. He has been smoking about 0.37 packs per day. He has never used smokeless tobacco. He reports previous drug use. He reports that he does not drink alcohol. REVIEW OF SYSTEMS:    · Constitutional: there has been no unanticipated weight loss. There's been No change in energy level, No change in activity level. · Eyes: No visual changes or diplopia. No scleral icterus. · ENT: No Headaches, hearing loss or vertigo. No mouth sores or sore throat. · Cardiovascular: No chest pain, no dyspnea, no chf like symptoms  · Respiratory: No previous pulmonary problems  · Gastrointestinal: No abdominal pain, appetite loss, blood in stools. No change in bowel or bladder habits. · Genitourinary: No dysuria, trouble voiding, or hematuria. · Musculoskeletal:  No gait disturbance, No weakness or joint complaints. · Integumentary: No rash or pruritis. · Neurological: No headache, diplopia, change in muscle strength, numbness or tingling. No change in gait, balance, coordination, mood, affect, memory, mentation, behavior. · Psychiatric: No new anxiety or depression. · Endocrine: No temperature intolerance. No excessive thirst, fluid intake, or urination. No tremor. · Hematologic/Lymphatic: No abnormal bruising or bleeding, blood clots or swollen lymph nodes. · Allergic/Immunologic: No nasal congestion or hives.       Physical Exam:  BP (!) 142/80   Pulse 92   Wt (!) 439 lb (199.1 kg)   BMI 60.80 kg/m²   General appearance: alert and cooperative with exam  HEENT: Head: Normocephalic, no lesions, without questions. Lyndon Adhikari, [address] [facility]  [facility]  [phone]       '

## 2020-05-26 RX ORDER — ATORVASTATIN CALCIUM 40 MG/1
TABLET, FILM COATED ORAL
Qty: 90 TABLET | Refills: 0 | Status: SHIPPED | OUTPATIENT
Start: 2020-05-26 | End: 2020-05-28

## 2020-05-26 RX ORDER — LISINOPRIL AND HYDROCHLOROTHIAZIDE 25; 20 MG/1; MG/1
TABLET ORAL
Qty: 90 TABLET | Refills: 0 | Status: SHIPPED | OUTPATIENT
Start: 2020-05-26 | End: 2020-05-28

## 2020-05-26 NOTE — TELEPHONE ENCOUNTER
Ama Martin is calling to request a refill on the following medication(s):  Requested Prescriptions     Pending Prescriptions Disp Refills    atorvastatin (LIPITOR) 40 MG tablet [Pharmacy Med Name: Atorvastatin Calcium 40 MG Oral Tablet] 90 tablet 0     Sig: Take 1 tablet by mouth once daily    lisinopril-hydroCHLOROthiazide (PRINZIDE;ZESTORETIC) 20-25 MG per tablet [Pharmacy Med Name: Lisinopril-hydroCHLOROthiazide 20-25 MG Oral Tablet] 90 tablet 0     Sig: Take 1 tablet by mouth once daily       Last Visit Date (If Applicable):  2/66/5925    Next Visit Date:    7/28/2020

## 2020-05-28 RX ORDER — LISINOPRIL AND HYDROCHLOROTHIAZIDE 25; 20 MG/1; MG/1
TABLET ORAL
Qty: 90 TABLET | Refills: 0 | Status: SHIPPED | OUTPATIENT
Start: 2020-05-28 | End: 2020-07-16 | Stop reason: SDUPTHER

## 2020-05-28 RX ORDER — ATORVASTATIN CALCIUM 40 MG/1
TABLET, FILM COATED ORAL
Qty: 90 TABLET | Refills: 0 | Status: SHIPPED | OUTPATIENT
Start: 2020-05-28 | End: 2020-07-16 | Stop reason: SDUPTHER

## 2020-07-16 ENCOUNTER — OFFICE VISIT (OUTPATIENT)
Dept: FAMILY MEDICINE CLINIC | Age: 55
End: 2020-07-16
Payer: MEDICARE

## 2020-07-16 VITALS
DIASTOLIC BLOOD PRESSURE: 84 MMHG | BODY MASS INDEX: 64.26 KG/M2 | OXYGEN SATURATION: 98 % | WEIGHT: 315 LBS | SYSTOLIC BLOOD PRESSURE: 136 MMHG | HEART RATE: 92 BPM

## 2020-07-16 LAB — HBA1C MFR BLD: 7.5 %

## 2020-07-16 PROCEDURE — 3017F COLORECTAL CA SCREEN DOC REV: CPT | Performed by: INTERNAL MEDICINE

## 2020-07-16 PROCEDURE — 2022F DILAT RTA XM EVC RTNOPTHY: CPT | Performed by: INTERNAL MEDICINE

## 2020-07-16 PROCEDURE — G8427 DOCREV CUR MEDS BY ELIG CLIN: HCPCS | Performed by: INTERNAL MEDICINE

## 2020-07-16 PROCEDURE — 4004F PT TOBACCO SCREEN RCVD TLK: CPT | Performed by: INTERNAL MEDICINE

## 2020-07-16 PROCEDURE — 3051F HG A1C>EQUAL 7.0%<8.0%: CPT | Performed by: INTERNAL MEDICINE

## 2020-07-16 PROCEDURE — 99214 OFFICE O/P EST MOD 30 MIN: CPT | Performed by: INTERNAL MEDICINE

## 2020-07-16 PROCEDURE — 3023F SPIROM DOC REV: CPT | Performed by: INTERNAL MEDICINE

## 2020-07-16 PROCEDURE — 83036 HEMOGLOBIN GLYCOSYLATED A1C: CPT | Performed by: INTERNAL MEDICINE

## 2020-07-16 PROCEDURE — G8926 SPIRO NO PERF OR DOC: HCPCS | Performed by: INTERNAL MEDICINE

## 2020-07-16 PROCEDURE — 99214 OFFICE O/P EST MOD 30 MIN: CPT

## 2020-07-16 PROCEDURE — G8417 CALC BMI ABV UP PARAM F/U: HCPCS | Performed by: INTERNAL MEDICINE

## 2020-07-16 RX ORDER — EMPAGLIFLOZIN 10 MG/1
10 TABLET, FILM COATED ORAL DAILY
Qty: 30 TABLET | Refills: 5 | Status: SHIPPED | OUTPATIENT
Start: 2020-07-16 | End: 2020-10-15

## 2020-07-16 RX ORDER — CYCLOBENZAPRINE HCL 10 MG
10 TABLET ORAL 3 TIMES DAILY PRN
Qty: 90 TABLET | Refills: 1 | Status: SHIPPED | OUTPATIENT
Start: 2020-07-16 | End: 2020-11-02

## 2020-07-16 RX ORDER — LISINOPRIL AND HYDROCHLOROTHIAZIDE 25; 20 MG/1; MG/1
1 TABLET ORAL DAILY
Qty: 90 TABLET | Refills: 5 | Status: SHIPPED | OUTPATIENT
Start: 2020-07-16 | End: 2021-04-15

## 2020-07-16 RX ORDER — TRIAMCINOLONE ACETONIDE 5 MG/G
CREAM TOPICAL
Qty: 1 TUBE | Refills: 0 | Status: SHIPPED | OUTPATIENT
Start: 2020-07-16

## 2020-07-16 RX ORDER — MULTIVIT,IRON,MINERALS/LUTEIN
TABLET ORAL
COMMUNITY

## 2020-07-16 RX ORDER — MELOXICAM 15 MG/1
15 TABLET ORAL DAILY PRN
Qty: 90 TABLET | Refills: 2 | Status: SHIPPED | OUTPATIENT
Start: 2020-07-16 | End: 2021-06-01

## 2020-07-16 RX ORDER — BUPROPION HYDROCHLORIDE 150 MG/1
150 TABLET, EXTENDED RELEASE ORAL DAILY
Qty: 60 TABLET | Refills: 5 | Status: SHIPPED | OUTPATIENT
Start: 2020-07-16

## 2020-07-16 RX ORDER — ATORVASTATIN CALCIUM 40 MG/1
40 TABLET, FILM COATED ORAL DAILY
Qty: 90 TABLET | Refills: 3 | Status: SHIPPED | OUTPATIENT
Start: 2020-07-16 | End: 2021-05-14 | Stop reason: SDUPTHER

## 2020-07-16 ASSESSMENT — ENCOUNTER SYMPTOMS
SORE THROAT: 0
ABDOMINAL PAIN: 0
SHORTNESS OF BREATH: 0
BACK PAIN: 1
COUGH: 1
RHINORRHEA: 0
BLOOD IN STOOL: 0
VOICE CHANGE: 0
CHEST TIGHTNESS: 0
SINUS PRESSURE: 0
DIARRHEA: 0
TROUBLE SWALLOWING: 0
FACIAL SWELLING: 0
SINUS PAIN: 0

## 2020-07-16 NOTE — PROGRESS NOTES
meloxicam (MOBIC) 15 MG tablet Take 1 tablet by mouth once daily Yes IONA Browne - NEMESIO   lisinopril-hydroCHLOROthiazide (PRINZIDE;ZESTORETIC) 20-25 MG per tablet Take 1 tablet by mouth once daily Yes Maranda Couch MD   atorvastatin (LIPITOR) 40 MG tablet Take 1 tablet by mouth once daily Yes Maranda Couch MD   aspirin 81 MG tablet Take 81 mg by mouth daily Yes Historical Provider, MD   ELIQUIS 2.5 MG TABS tablet TAKE ONE TABLET BY MOUTH TWICE DAILY FOR 21 DAYS Yes Historical Provider, MD   docusate sodium (COLACE) 100 MG capsule TAKE ONE CAPSULE BY MOUTH TWICE DAILY Yes Historical Provider, MD   mupirocin (BACTROBAN) 2 % ointment APPLY A SMALL AMOUNT TO EACH NOSTRIL TWICE DAILY STARTING 5 DAYS PRIOR TO SURGERY Yes Historical Provider, MD   Acetaminophen (TYLENOL ARTHRITIS PAIN PO) Take 1 tablet by mouth as needed Yes Historical Provider, MD   Multiple Vitamin (MULTI-VITAMIN DAILY) TABS Take by mouth Yes Historical Provider, MD   buPROPion (WELLBUTRIN SR) 150 MG extended release tablet Take 150 mg by mouth daily Yes Historical Provider, MD   Handicap Placard MISC by Does not apply route Yes Historical Provider, MD   CPAP Machine MISC by Does not apply route Yes Historical Provider, MD   empagliflozin (JARDIANCE) 10 MG tablet Take 1 tablet by mouth daily  Patient not taking: Reported on 2/4/2020  Christal Alvarez MD        Allergies:  has No Known Allergies. Past Medical History:   has a past medical history of Hyperlipidemia, Hypertriglyceridemia, Obesity, and Tobacco abuse. Past Surgical History   has a past surgical history that includes Abscess Drainage (08/2014); Total hip arthroplasty (Right, 12/2018); and Revision total hip arthroplasty (12/2019). Family History  family history includes Cancer in his father; Diabetes in his mother; Other in his brother. Social History   reports that he has been smoking cigarettes. He has been smoking about 0.37 packs per day.  He has never used smokeless tobacco. He reports previous drug use. He reports that he does not drink alcohol. Health Maintenance:    Health Maintenance   Topic Date Due    Pneumococcal 0-64 years Vaccine (1 of 1 - PPSV23) 06/29/1971    Diabetic retinal exam  06/29/1975    Hepatitis B vaccine (1 of 3 - Risk 3-dose series) 06/29/1984    Shingles Vaccine (1 of 2) 06/29/2015    Colon cancer screen colonoscopy  06/29/2015    DTaP/Tdap/Td vaccine (2 - Td) 07/13/2017    Annual Wellness Visit (AWV)  05/29/2019    Diabetic microalbuminuria test  07/29/2020    Lipid screen  07/29/2020    HIV screen  10/17/2027 (Originally 6/29/1980)    Flu vaccine (1) 09/01/2020    Diabetic foot exam  01/28/2021    A1C test (Diabetic or Prediabetic)  01/28/2021    Potassium monitoring  02/19/2021    Creatinine monitoring  02/19/2021    Hepatitis C screen  Completed    Hepatitis A vaccine  Aged Out    Hib vaccine  Aged Out    Meningococcal (ACWY) vaccine  Aged Out       Subjective:      Review of Systems   Constitutional: Positive for unexpected weight change. Negative for fatigue and fever. Weight gain>170 lbs   HENT: Positive for ear pain. Negative for congestion, dental problem, ear discharge, facial swelling, hearing loss, mouth sores, postnasal drip, rhinorrhea, sinus pressure, sinus pain, sneezing, sore throat, tinnitus, trouble swallowing and voice change. Eyes: Negative for visual disturbance. Respiratory: Positive for cough. Negative for chest tightness and shortness of breath. Cardiovascular: Positive for leg swelling. Negative for chest pain. Gastrointestinal: Negative for abdominal pain, blood in stool and diarrhea. Endocrine: Negative for polyuria. Genitourinary: Negative for difficulty urinating and flank pain. Musculoskeletal: Positive for arthralgias, back pain and myalgias. Negative for joint swelling. Skin: Positive for rash. Allergic/Immunologic: Negative for environmental allergies. Neurological: Negative for seizures, syncope, speech difficulty, weakness, light-headedness, numbness and headaches. Hematological: Negative for adenopathy. Psychiatric/Behavioral: Negative for behavioral problems and suicidal ideas. The patient is not nervous/anxious. Objective:     /84 (Site: Right Upper Arm, Position: Sitting)   Pulse 92   Wt (!) 464 lb (210.5 kg)   SpO2 98%   BMI 64.26 kg/m²     Physical Exam  Vitals signs and nursing note reviewed. Constitutional:       Appearance: He is obese. Comments: In a wheelchair   HENT:      Head: Normocephalic and atraumatic. Nose: No congestion. Mouth/Throat:      Mouth: Mucous membranes are moist.   Eyes:      Pupils: Pupils are equal, round, and reactive to light. Neck:      Musculoskeletal: Normal range of motion. Cardiovascular:      Rate and Rhythm: Normal rate and regular rhythm. Pulses: Normal pulses. Heart sounds: Normal heart sounds. No murmur. No friction rub. No gallop. Pulmonary:      Effort: Pulmonary effort is normal.      Breath sounds: No stridor. Comments: Decreased breath sounds at the bases. Abdominal:      Palpations: Abdomen is soft. Musculoskeletal:         General: Swelling present. No tenderness, deformity or signs of injury. Right lower leg: Edema present. Left lower leg: Edema present. Comments: Non pitting edema in bilaterally lower extremity upto the thighs RLE>LLE. .+stasis dermatitis but no evidence of cellulitis/ulcers/wounds. Skin:     General: Skin is warm. Comments: Macular erythematous rash posterior left shoulder. Neurological:      General: No focal deficit present. Mental Status: He is oriented to person, place, and time. Mental status is at baseline. Psychiatric:         Mood and Affect: Mood normal.             Assessment       Diagnosis Orders   1.  Type 2 diabetes mellitus with complication, without long-term current use of insulin (HCC)  POCT Hb A1C (glycosylated hemoglobin)    meloxicam (MOBIC) 15 MG tablet    lisinopril-hydroCHLOROthiazide (PRINZIDE;ZESTORETIC) 20-25 MG per tablet    empagliflozin (JARDIANCE) 10 MG tablet    atorvastatin (LIPITOR) 40 MG tablet    buPROPion (WELLBUTRIN SR) 150 MG extended release tablet    metFORMIN (GLUCOPHAGE) 1000 MG tablet    cyclobenzaprine (FLEXERIL) 10 MG tablet    triamcinolone (ARISTOCORT) 0.5 % cream    CBC With Auto Differential    Comprehensive Metabolic Panel    Lipid Panel    Vitamin D 25 Hydroxy    TSH    T4, Free   2. TEOFILO on CPAP  meloxicam (MOBIC) 15 MG tablet    lisinopril-hydroCHLOROthiazide (PRINZIDE;ZESTORETIC) 20-25 MG per tablet    empagliflozin (JARDIANCE) 10 MG tablet    atorvastatin (LIPITOR) 40 MG tablet    buPROPion (WELLBUTRIN SR) 150 MG extended release tablet    metFORMIN (GLUCOPHAGE) 1000 MG tablet    cyclobenzaprine (FLEXERIL) 10 MG tablet    triamcinolone (ARISTOCORT) 0.5 % cream    CBC With Auto Differential    Comprehensive Metabolic Panel    Lipid Panel    Vitamin D 25 Hydroxy    TSH    T4, Free   3. Chronic bronchitis, unspecified chronic bronchitis type (HCC)  meloxicam (MOBIC) 15 MG tablet    lisinopril-hydroCHLOROthiazide (PRINZIDE;ZESTORETIC) 20-25 MG per tablet    empagliflozin (JARDIANCE) 10 MG tablet    atorvastatin (LIPITOR) 40 MG tablet    buPROPion (WELLBUTRIN SR) 150 MG extended release tablet    metFORMIN (GLUCOPHAGE) 1000 MG tablet    cyclobenzaprine (FLEXERIL) 10 MG tablet    triamcinolone (ARISTOCORT) 0.5 % cream    CBC With Auto Differential    Comprehensive Metabolic Panel    Lipid Panel    Vitamin D 25 Hydroxy    TSH    T4, Free   4.  Smoking  meloxicam (MOBIC) 15 MG tablet    lisinopril-hydroCHLOROthiazide (PRINZIDE;ZESTORETIC) 20-25 MG per tablet    empagliflozin (JARDIANCE) 10 MG tablet    atorvastatin (LIPITOR) 40 MG tablet    buPROPion (WELLBUTRIN SR) 150 MG extended release tablet    metFORMIN (GLUCOPHAGE) 1000 MG tablet    cyclobenzaprine empagliflozin (JARDIANCE) 10 MG tablet    atorvastatin (LIPITOR) 40 MG tablet    buPROPion (WELLBUTRIN SR) 150 MG extended release tablet    metFORMIN (GLUCOPHAGE) 1000 MG tablet    cyclobenzaprine (FLEXERIL) 10 MG tablet    triamcinolone (ARISTOCORT) 0.5 % cream    CBC With Auto Differential    Comprehensive Metabolic Panel    Lipid Panel    Vitamin D 25 Hydroxy    TSH    T4, Free   9. Lymphedema of both lower extremities  meloxicam (MOBIC) 15 MG tablet    lisinopril-hydroCHLOROthiazide (PRINZIDE;ZESTORETIC) 20-25 MG per tablet    empagliflozin (JARDIANCE) 10 MG tablet    atorvastatin (LIPITOR) 40 MG tablet    buPROPion (WELLBUTRIN SR) 150 MG extended release tablet    metFORMIN (GLUCOPHAGE) 1000 MG tablet    cyclobenzaprine (FLEXERIL) 10 MG tablet    triamcinolone (ARISTOCORT) 0.5 % cream    CBC With Auto Differential    Comprehensive Metabolic Panel    Lipid Panel    Vitamin D 25 Hydroxy    TSH    T4, Free   10. Rash of back  meloxicam (MOBIC) 15 MG tablet    lisinopril-hydroCHLOROthiazide (PRINZIDE;ZESTORETIC) 20-25 MG per tablet    empagliflozin (JARDIANCE) 10 MG tablet    atorvastatin (LIPITOR) 40 MG tablet    buPROPion (WELLBUTRIN SR) 150 MG extended release tablet    metFORMIN (GLUCOPHAGE) 1000 MG tablet    cyclobenzaprine (FLEXERIL) 10 MG tablet    triamcinolone (ARISTOCORT) 0.5 % cream    CBC With Auto Differential    Comprehensive Metabolic Panel    Lipid Panel    Vitamin D 25 Hydroxy    TSH    T4, Free   11.  Back muscle spasm  meloxicam (MOBIC) 15 MG tablet    lisinopril-hydroCHLOROthiazide (PRINZIDE;ZESTORETIC) 20-25 MG per tablet    empagliflozin (JARDIANCE) 10 MG tablet    atorvastatin (LIPITOR) 40 MG tablet    buPROPion (WELLBUTRIN SR) 150 MG extended release tablet    metFORMIN (GLUCOPHAGE) 1000 MG tablet    cyclobenzaprine (FLEXERIL) 10 MG tablet    triamcinolone (ARISTOCORT) 0.5 % cream    CBC With Auto Differential    Comprehensive Metabolic Panel    Lipid Panel    Vitamin D 25 Hydroxy    TSH T4, Free   12. Primary osteoarthritis of right hip  meloxicam (MOBIC) 15 MG tablet    lisinopril-hydroCHLOROthiazide (PRINZIDE;ZESTORETIC) 20-25 MG per tablet    empagliflozin (JARDIANCE) 10 MG tablet    atorvastatin (LIPITOR) 40 MG tablet    buPROPion (WELLBUTRIN SR) 150 MG extended release tablet    metFORMIN (GLUCOPHAGE) 1000 MG tablet    cyclobenzaprine (FLEXERIL) 10 MG tablet    triamcinolone (ARISTOCORT) 0.5 % cream    CBC With Auto Differential    Comprehensive Metabolic Panel    Lipid Panel    Vitamin D 25 Hydroxy    TSH    T4, Free   13. Arthritis  meloxicam (MOBIC) 15 MG tablet    lisinopril-hydroCHLOROthiazide (PRINZIDE;ZESTORETIC) 20-25 MG per tablet    empagliflozin (JARDIANCE) 10 MG tablet    atorvastatin (LIPITOR) 40 MG tablet    buPROPion (WELLBUTRIN SR) 150 MG extended release tablet    metFORMIN (GLUCOPHAGE) 1000 MG tablet    cyclobenzaprine (FLEXERIL) 10 MG tablet    triamcinolone (ARISTOCORT) 0.5 % cream    CBC With Auto Differential    Comprehensive Metabolic Panel    Lipid Panel    Vitamin D 25 Hydroxy    TSH    T4, Free   14. Mixed hyperlipidemia  meloxicam (MOBIC) 15 MG tablet    lisinopril-hydroCHLOROthiazide (PRINZIDE;ZESTORETIC) 20-25 MG per tablet    empagliflozin (JARDIANCE) 10 MG tablet    atorvastatin (LIPITOR) 40 MG tablet    buPROPion (WELLBUTRIN SR) 150 MG extended release tablet    metFORMIN (GLUCOPHAGE) 1000 MG tablet    cyclobenzaprine (FLEXERIL) 10 MG tablet    triamcinolone (ARISTOCORT) 0.5 % cream    CBC With Auto Differential    Comprehensive Metabolic Panel    Lipid Panel    Vitamin D 25 Hydroxy    TSH    T4, Free         PLAN     Limb Hypertrophy/Lipedema-Lower extremity swelling is likely due to lymphedema given his morbid obesity/chronic venous insufficiency/pulmonary hypertension. He has a very classic progressive lymphedema with dermal thickening of the skin with skin becomes more dry and firm with less pitting due to cutaneous fibrosis and adipose deposition.   Will do the conservative management with compression stockings/bandages/pneumatic compression leg elevation weight loss.  -His left ear pain/mandible pain is likely due to TMJ joint dysfunction. Encouraged mouth guards on regular basis. Also try Flexeril on as needed basis. Jaw exercises, self-care, splints/dentures check.  -Rash-Topical triamcinolone cream, keep skin hydrated/coconut oil/aloe vera gel with moisturizer.  -Start him on Metformin 1000 mg BID, and restarting  Jardiance.   -Follow up with labs next visit. Orders Placed This Encounter   Procedures    CBC With Auto Differential     Standing Status:   Future     Standing Expiration Date:   7/16/2021    Comprehensive Metabolic Panel     Standing Status:   Future     Standing Expiration Date:   7/16/2021    Lipid Panel     Standing Status:   Future     Standing Expiration Date:   7/16/2021     Order Specific Question:   Is Patient Fasting?/# of Hours     Answer:   12 hours    Vitamin D 25 Hydroxy     Standing Status:   Future     Standing Expiration Date:   7/16/2021    TSH     Standing Status:   Future     Standing Expiration Date:   7/16/2021    T4, Free     Standing Status:   Future     Standing Expiration Date:   7/16/2021    POCT Hb A1C (glycosylated hemoglobin)        Return in about 3 months (around 10/16/2020). Patient given educational materials - see patient instructions. Discussed use, benefit, and side effects of prescribed medications. All patient questions answered. Pt voiced understanding. Reviewed health maintenance.        Electronically signed Leydi Lew MD on 7/16/2020 at 8:49 AM EDT

## 2020-07-16 NOTE — PATIENT INSTRUCTIONS
Patient Education        Temporomandibular Disorder: Care Instructions  Your Care Instructions     Temporomandibular (TM) disorders are a problem with the muscles and joints that connect your jaw to your skull. They cause pain when you open your mouth, chew, or yawn. You may feel this pain on one or both sides. TM disorders are often caused by tight jaw muscles. The tightness can be caused by clenching or grinding your teeth. This may happen when you have a lot of stress in your life. If you lower your stress, you may be able to stop clenching or grinding your teeth. This will help relax your jaw and reduce your pain. You may also be able to do some things at home to feel better. But if none of this works, your doctor may prescribe medicine to help relax your muscles and control the pain. Follow-up care is a key part of your treatment and safety. Be sure to make and go to all appointments, and call your doctor if you are having problems. It's also a good idea to know your test results and keep a list of the medicines you take. How can you care for yourself at home? · Put a warm, moist cloth or heating pad set on low on your jaw. Do this for 10 to 20 minutes at a time. Put a thin cloth between the heating pad and your skin. · Avoid hard or chewy foods that cause your jaws to work very hard. Examples include popcorn, jerky, tough meats, chewy breads, gum, and raw apples and carrots. · Choose softer foods that are easy to chew. These include eggs, yogurt, and soup. · Cut your food into small pieces. Chew slowly. · If your jaw gets too painful to chew, or if it locks, you may need to puree your food for a few days or weeks. · To relax your jaw, repeat this exercise for a few minutes every morning and evening. Watch yourself in a mirror. Gently open and close your mouth. Move your jaw straight up and down. But don't do this if it makes your pain worse.   · Get at least 30 minutes of exercise on most days of the week to relieve stress. Walking is a good choice. You also may want to do other activities, such as running, swimming, cycling, or playing tennis or team sports. · Do not:  ? Hold a phone between your shoulder and your jaw. ? Open your mouth all the way, like when you sing loudly or yawn. ? Clench or grind your teeth, bite your lips, or chew your fingernails. ? Clench things such as pens, pipes, or cigars between your teeth. When should you call for help? Call your doctor now or seek immediate medical care if:  · Your jaw is locked open or shut or it is hard to move your jaw. Watch closely for changes in your health, and be sure to contact your doctor if:  · Your jaw pain gets worse. · Your face is swollen. · You do not get better as expected. Where can you learn more? Go to https://[email or web address].[email or web address]. org and sign in to your [employer] account. Enter M105 in the Video[employer] box to learn more about \"Temporomandibular Disorder: Care Instructions. \"     If you do not have an account, please click on the \"Sign Up Now\" link. Current as of: March 25, 2020               Content Version: 12.5  © 7340-6563 [employer]. Care instructions adapted under license by [facility] ([facility]). If you have questions about a medical condition or this instruction, always ask your healthcare professional. [place] [address] any warranty or liability for your use of this information.

## 2020-07-23 ENCOUNTER — TELEPHONE (OUTPATIENT)
Dept: FAMILY MEDICINE CLINIC | Age: 55
End: 2020-07-23

## 2020-08-07 ENCOUNTER — HOSPITAL ENCOUNTER (OUTPATIENT)
Age: 55
Setting detail: SPECIMEN
Discharge: HOME | End: 2020-08-07
Payer: MEDICARE

## 2020-08-07 LAB
ABSOLUTE EOS #: 0.29 K/UL (ref 0–0.44)
ABSOLUTE IMMATURE GRANULOCYTE: <0.03 K/UL (ref 0–0.3)
ABSOLUTE LYMPH #: 2.17 K/UL (ref 1.1–3.7)
ABSOLUTE MONO #: 0.79 K/UL (ref 0.1–1.2)
ALBUMIN SERPL-MCNC: 3.9 G/DL (ref 3.5–5.2)
ALBUMIN/GLOBULIN RATIO: 1.1 (ref 1–2.5)
ALP BLD-CCNC: 89 U/L (ref 40–129)
ALT SERPL-CCNC: 30 U/L (ref 5–41)
ANION GAP SERPL CALCULATED.3IONS-SCNC: 9 MMOL/L (ref 9–17)
AST SERPL-CCNC: 26 U/L
BASOPHILS # BLD: 1 % (ref 0–2)
BASOPHILS ABSOLUTE: 0.06 K/UL (ref 0–0.2)
BILIRUB SERPL-MCNC: 0.45 MG/DL (ref 0.3–1.2)
BUN BLDV-MCNC: 12 MG/DL (ref 6–20)
BUN/CREAT BLD: 16 (ref 9–20)
CALCIUM SERPL-MCNC: 9.8 MG/DL (ref 8.6–10.4)
CHLORIDE BLD-SCNC: 99 MMOL/L (ref 98–107)
CHOLESTEROL/HDL RATIO: 3.6
CHOLESTEROL: 128 MG/DL
CO2: 29 MMOL/L (ref 20–31)
CREAT SERPL-MCNC: 0.73 MG/DL (ref 0.7–1.2)
DIFFERENTIAL TYPE: ABNORMAL
EOSINOPHILS RELATIVE PERCENT: 4 % (ref 1–4)
GFR AFRICAN AMERICAN: >60 ML/MIN
GFR NON-AFRICAN AMERICAN: >60 ML/MIN
GFR SERPL CREATININE-BSD FRML MDRD: ABNORMAL ML/MIN/{1.73_M2}
GFR SERPL CREATININE-BSD FRML MDRD: ABNORMAL ML/MIN/{1.73_M2}
GLUCOSE BLD-MCNC: 129 MG/DL (ref 70–99)
HCT VFR BLD CALC: 41.7 % (ref 40.7–50.3)
HDLC SERPL-MCNC: 36 MG/DL
HEMOGLOBIN: 13.3 G/DL (ref 13–17)
IMMATURE GRANULOCYTES: 0 %
LDL CHOLESTEROL: 67 MG/DL (ref 0–130)
LYMPHOCYTES # BLD: 29 % (ref 24–43)
MCH RBC QN AUTO: 28.5 PG (ref 25.2–33.5)
MCHC RBC AUTO-ENTMCNC: 31.9 G/DL (ref 25.2–33.5)
MCV RBC AUTO: 89.5 FL (ref 82.6–102.9)
MONOCYTES # BLD: 11 % (ref 3–12)
NRBC AUTOMATED: 0 PER 100 WBC
PDW BLD-RTO: 14.9 % (ref 11.8–14.4)
PLATELET # BLD: 207 K/UL (ref 138–453)
PLATELET ESTIMATE: ABNORMAL
PMV BLD AUTO: 9.8 FL (ref 8.1–13.5)
POTASSIUM SERPL-SCNC: 4.6 MMOL/L (ref 3.7–5.3)
RBC # BLD: 4.66 M/UL (ref 4.21–5.77)
RBC # BLD: ABNORMAL 10*6/UL
SEG NEUTROPHILS: 55 % (ref 36–65)
SEGMENTED NEUTROPHILS ABSOLUTE COUNT: 4.07 K/UL (ref 1.5–8.1)
SODIUM BLD-SCNC: 137 MMOL/L (ref 135–144)
THYROXINE, FREE: 0.97 NG/DL (ref 0.93–1.7)
TOTAL PROTEIN: 7.3 G/DL (ref 6.4–8.3)
TRIGL SERPL-MCNC: 124 MG/DL
TSH SERPL DL<=0.05 MIU/L-ACNC: 3.97 MIU/L (ref 0.3–5)
VITAMIN D 25-HYDROXY: 31.2 NG/ML (ref 30–100)
VLDLC SERPL CALC-MCNC: ABNORMAL MG/DL (ref 1–30)
WBC # BLD: 7.4 K/UL (ref 3.5–11.3)
WBC # BLD: ABNORMAL 10*3/UL

## 2020-08-07 PROCEDURE — 84439 ASSAY OF FREE THYROXINE: CPT

## 2020-08-07 PROCEDURE — 84443 ASSAY THYROID STIM HORMONE: CPT

## 2020-08-07 PROCEDURE — 82306 VITAMIN D 25 HYDROXY: CPT

## 2020-08-07 PROCEDURE — 85025 COMPLETE CBC W/AUTO DIFF WBC: CPT

## 2020-08-07 PROCEDURE — 80061 LIPID PANEL: CPT

## 2020-08-07 PROCEDURE — 80053 COMPREHEN METABOLIC PANEL: CPT

## 2020-08-07 PROCEDURE — 36415 COLL VENOUS BLD VENIPUNCTURE: CPT

## 2020-10-15 ENCOUNTER — OFFICE VISIT (OUTPATIENT)
Dept: FAMILY MEDICINE CLINIC | Age: 55
End: 2020-10-15
Payer: MEDICARE

## 2020-10-15 VITALS
HEART RATE: 105 BPM | OXYGEN SATURATION: 97 % | SYSTOLIC BLOOD PRESSURE: 126 MMHG | WEIGHT: 315 LBS | BODY MASS INDEX: 61.63 KG/M2 | DIASTOLIC BLOOD PRESSURE: 68 MMHG

## 2020-10-15 LAB — HBA1C MFR BLD: 6.6 %

## 2020-10-15 PROCEDURE — 83036 HEMOGLOBIN GLYCOSYLATED A1C: CPT | Performed by: INTERNAL MEDICINE

## 2020-10-15 PROCEDURE — 99214 OFFICE O/P EST MOD 30 MIN: CPT | Performed by: INTERNAL MEDICINE

## 2020-10-15 NOTE — PROGRESS NOTES
[address]  [address]  Dept: [phone]  Dept Fax: [phone]: [phone]     Visit Date:  10/15/2020    Patient:  Julita Philippe  YOB: 1965    HPI:   Juliat Philippe presents today for   Chief Complaint   Patient presents with    Diabetes     patient is here for a three month diabetic check. Jaxon Crowder HPI  42-year-old male with a history of morbid obesity with a BMI of 64, TEOFILO on CPAP, pulmonary hypertension, chronic bronchitis with emphysema, smoking history currently half a pack a day, chronic venous insufficiency/peripheral edema, hypertension, diabetes mellitus type 2, dyslipidemia, CAD, status post couple of right hip replacement surgeries and multiple dislocations in the past is coming in 3 month follow up. DM type 2- A1C 6.6 today gone down since last time was 7.5. Does not check blood sugars at home and not any meds. Denies any diabetes induced foot infections/ulcers/history of diabetic foot ulcers/complication of neuropathy/nephropathy/retinopathy. On Metformin and we stopped Jardiance due to the cost of the medication. Morbid obesity-gained almost more than 170 pounds in the past 1 year likely due to immobolization/chronic osteoarthritis/low back pain and spasms and hip replacement surgeries and dislocations. Currently not interested in the bariatric surgery due to COVID-19 outbreak.     Smoker-Used to smoke 3 ppd per day and now down to 1/2 ppd. On Wellbutrin and trying to quit      Chronic bronchitis/COPD/P.HTN/TEOFILO on BipAp- Unclear if patient using any inhalers? Will need to clarify next visit. Per Pumonology/BIPAP titration study/ and encourage weight loss/smoking cessation. CAD  \"Last seen by cardiology in August of this year.  No changes were made.  He had an equivocal stress test prior to his surgery with the suggestion of perhaps some inferior ischemia though his echocardiogram was normal with an ejection fraction of 55%.  As noted below he has no chest pain. No chest pressure. TTE 4/24/17 : LVEF 55%. Mild to moderate LVH, Mild TR, RVSP 41 mmHg     Stress 11/27/18  Fixed inferior defect  Probable mild distal anteroapical reversible defect suggestive of ischemia  EF 58%  Low Risk Study\"     \"Per cardiology-Hx of slightly abnormal stress test as above. Wanted to do hip first. Says he is asymptomatic and wants to defer invasive cardiac work up\"    Chronic Bilateral Leg edema/lymphedema- likely due to P.HTN/chronic venous insufficiency/obesity-. Wife they have noticed a huge pockets of heaviness tightness anteromedial portion of his right lower extremity being thick/ it feels like almost like a trunk of the tree. No pain no signs or symptoms of infections or cellulitis or open wounds ulcers. Wearing compression bandages/pneumatic compression. Medications  Prior to Visit Medications    Medication Sig Taking? Authorizing Provider   Multiple Minerals-Vitamins (CALCIUM-MAGNESIUM-ZINC-D3) TABS Take by mouth Yes Historical Provider, MD   meloxicam (MOBIC) 15 MG tablet Take 1 tablet by mouth daily as needed for Pain Yes Orlando Weinstein MD   lisinopril-hydroCHLOROthiazide (PRINZIDE;ZESTORETIC) 20-25 MG per tablet Take 1 tablet by mouth daily Yes Orlando Weinstein MD   atorvastatin (LIPITOR) 40 MG tablet Take 1 tablet by mouth daily Yes Orlando Weinstein MD   buPROPion (WELLBUTRIN SR) 150 MG extended release tablet Take 1 tablet by mouth daily Yes Orlando Weinstein MD   metFORMIN (GLUCOPHAGE) 1000 MG tablet Take 1 tablet by mouth 2 times daily (with meals) Yes Orlando Weinstein MD   triamcinolone (ARISTOCORT) 0.5 % cream Apply topically 3 times daily.  Yes Orlando Weinstein MD   aspirin 81 MG tablet Take 81 mg by mouth daily Yes Historical Provider, MD   docusate sodium (COLACE) 100 MG capsule TAKE ONE CAPSULE BY MOUTH TWICE DAILY Yes Historical Provider, MD mupirocin (BACTROBAN) 2 % ointment APPLY A SMALL AMOUNT TO EACH NOSTRIL TWICE DAILY STARTING 5 DAYS PRIOR TO SURGERY Yes Historical Provider, MD   Acetaminophen (TYLENOL ARTHRITIS PAIN PO) Take 1 tablet by mouth as needed Yes Historical Provider, MD   Multiple Vitamin (MULTI-VITAMIN DAILY) TABS Take by mouth Yes Historical Provider, MD   Handicap Placard MISC by Does not apply route Yes Historical Provider, MD   CPAP Machine MISC by Does not apply route Yes Historical Provider, MD   empagliflozin (JARDIANCE) 10 MG tablet Take 1 tablet by mouth daily  Patient not taking: Reported on 10/15/2020  Bradley Soriano MD        Allergies:  has No Known Allergies. Past Medical History:   has a past medical history of Hyperlipidemia, Hypertriglyceridemia, Obesity, and Tobacco abuse. Past Surgical History   has a past surgical history that includes Abscess Drainage (08/2014); Total hip arthroplasty (Right, 12/2018); and Revision total hip arthroplasty (12/2019). Family History  family history includes Cancer in his father; Diabetes in his mother; Other in his brother. Social History   reports that he has been smoking cigarettes. He has been smoking about 0.37 packs per day. He has never used smokeless tobacco. He reports previous drug use. He reports that he does not drink alcohol.     Health Maintenance:    Health Maintenance   Topic Date Due    Pneumococcal 0-64 years Vaccine (1 of 1 - PPSV23) 06/29/1971    Diabetic retinal exam  06/29/1975    Hepatitis B vaccine (1 of 3 - Risk 3-dose series) 06/29/1984    Shingles Vaccine (1 of 2) 06/29/2015    Colon cancer screen colonoscopy  06/29/2015    DTaP/Tdap/Td vaccine (2 - Td) 07/13/2017    Annual Wellness Visit (AWV)  05/29/2019    Diabetic microalbuminuria test  07/29/2020    Flu vaccine (1) 09/01/2020    HIV screen  10/17/2027 (Originally 6/29/1980)    Diabetic foot exam  01/28/2021    Lipid screen  08/07/2021    Potassium monitoring  08/07/2021    Creatinine monitoring  08/07/2021    A1C test (Diabetic or Prediabetic)  10/15/2021    Hepatitis C screen  Completed    Hepatitis A vaccine  Aged Out    Hib vaccine  Aged Out    Meningococcal (ACWY) vaccine  Aged Out       Subjective:      Review of Systems   Constitutional: Negative for fatigue, fever and unexpected weight change. HENT: Negative for ear pain, postnasal drip, rhinorrhea, sinus pain, sore throat and trouble swallowing. Eyes: Negative for visual disturbance. Respiratory: Negative for cough, chest tightness and shortness of breath. Cardiovascular: Negative for chest pain and leg swelling. Gastrointestinal: Negative for abdominal pain, blood in stool and diarrhea. Endocrine: Negative for polyuria. Genitourinary: Negative for difficulty urinating and flank pain. Musculoskeletal: Positive for arthralgias, back pain, gait problem and myalgias. Negative for joint swelling. Skin: Negative for rash. Allergic/Immunologic: Negative for environmental allergies. Neurological: Negative for weakness, light-headedness, numbness and headaches. Hematological: Negative for adenopathy. Psychiatric/Behavioral: Negative for behavioral problems and suicidal ideas. The patient is not nervous/anxious. Objective:     /68 (Site: Right Lower Arm, Position: Sitting)   Pulse 105   Wt (!) 445 lb (201.9 kg)   SpO2 97%   BMI 61.63 kg/m²     Physical Exam  Vitals signs and nursing note reviewed. Constitutional:       Appearance: He is obese. Comments: In wheelchair. HENT:      Head: Normocephalic and atraumatic. Cardiovascular:      Rate and Rhythm: Normal rate. Comments: Distant heart sounds. Pulmonary:      Breath sounds: No stridor. Comments: Decreased breath sounds all over. Musculoskeletal:      Right lower leg: Edema present. Left lower leg: Edema present. Comments: Lymphedema. Skin:     General: Skin is warm.    Neurological:      Mental Status: He is oriented to person, place, and time. Mental status is at baseline. Psychiatric:         Mood and Affect: Mood normal.             Assessment       Diagnosis Orders   1. Type 2 diabetes mellitus with complication, without long-term current use of insulin ([facility])  POCT Hb A1C (glycosylated hemoglobin)   2. Morbid obesity [facility])  External Referral To Lymphedema Clinic   3. Cigarette smoker     4. Hyperlipidemia, unspecified hyperlipidemia type     5. Obstructive sleep apnea     6. Venous insufficiency (chronic) (peripheral)     7. Essential hypertension     8. Lymph edema  External Referral To Lymphedema Clinic         PLAN       Continue with same regiment. Weight loss should help. Referral to lymphedema clinic. Orders Placed This Encounter   Procedures    External Referral To Lymphedema Clinic     Referral Priority:   Routine     Referral Type:   Eval and Treat     Referral Reason:   Specialty Services Required     Requested Specialty:   Clinic     Number of Visits Requested:   1    POCT Hb A1C (glycosylated hemoglobin)        No follow-ups on file. Patient given educational materials - see patient instructions. Discussed use, benefit, and side effects of prescribed medications. All patient questions answered. Pt voiced understanding. Reviewed health maintenance.        Electronically signed Mike Godoy MD on 10/15/2020 at 8:58 AM EDT

## 2020-10-21 ASSESSMENT — ENCOUNTER SYMPTOMS
SHORTNESS OF BREATH: 0
SORE THROAT: 0
BLOOD IN STOOL: 0
RHINORRHEA: 0
ABDOMINAL PAIN: 0
BACK PAIN: 1
CHEST TIGHTNESS: 0
DIARRHEA: 0
TROUBLE SWALLOWING: 0
SINUS PAIN: 0
COUGH: 0

## 2020-11-02 RX ORDER — CYCLOBENZAPRINE HCL 10 MG
TABLET ORAL
Qty: 90 TABLET | Refills: 0 | Status: SHIPPED | OUTPATIENT
Start: 2020-11-02 | End: 2020-12-23

## 2020-11-02 NOTE — TELEPHONE ENCOUNTER
Мария Galloway is requesting a refill on the following medication(s):  Requested Prescriptions     Pending Prescriptions Disp Refills    cyclobenzaprine (FLEXERIL) 10 MG tablet [Pharmacy Med Name: Cyclobenzaprine HCl 10 MG Oral Tablet] 90 tablet 0     Sig: Take 1 tablet by mouth three times daily as needed for muscle spasm       Last Visit Date (If Applicable):  42/57/1276    Next Visit Date:    1/13/2021

## 2020-12-23 RX ORDER — CYCLOBENZAPRINE HCL 10 MG
TABLET ORAL
Qty: 90 TABLET | Refills: 0 | Status: SHIPPED | OUTPATIENT
Start: 2020-12-23 | End: 2021-02-02

## 2020-12-23 NOTE — TELEPHONE ENCOUNTER
Deloris Grossman is requesting a refill on the following medication(s):  Requested Prescriptions     Pending Prescriptions Disp Refills    cyclobenzaprine (FLEXERIL) 10 MG tablet [Pharmacy Med Name: Cyclobenzaprine HCl 10 MG Oral Tablet] 90 tablet 0     Sig: Take 1 tablet by mouth three times daily as needed for muscle spasm       Last Visit Date (If Applicable):  20/98/4645    Next Visit Date:    1/13/2021

## 2021-01-19 ENCOUNTER — OFFICE VISIT (OUTPATIENT)
Dept: FAMILY MEDICINE CLINIC | Age: 56
End: 2021-01-19
Payer: MEDICARE

## 2021-01-19 VITALS — OXYGEN SATURATION: 98 % | DIASTOLIC BLOOD PRESSURE: 84 MMHG | SYSTOLIC BLOOD PRESSURE: 136 MMHG | HEART RATE: 117 BPM

## 2021-01-19 DIAGNOSIS — F17.200 SMOKING: Primary | ICD-10-CM

## 2021-01-19 DIAGNOSIS — S91.209S: ICD-10-CM

## 2021-01-19 PROCEDURE — 99211 OFF/OP EST MAY X REQ PHY/QHP: CPT

## 2021-01-19 PROCEDURE — 99215 OFFICE O/P EST HI 40 MIN: CPT | Performed by: INTERNAL MEDICINE

## 2021-01-19 NOTE — PROGRESS NOTES
[address]  [address]  Dept: [phone]  Dept Fax: [phone]: [phone]     Visit Date:  1/19/2021    Patient:  Brianna Rojas  YOB: 1965    HPI:   Brianna Rojas presents today for   Chief Complaint   Patient presents with   [facility] ED Follow-up     patient was in the ER for toe nail removal. patient is doing well and toe does not appear infected. Tosin Maher HPI 55-year-old male with a history of morbid obesity with a BMI of 64, TEOFILO on CPAP, pulmonary hypertension, chronic bronchitis with emphysema, smoking history currently half a pack a day, chronic venous insufficiency/peripheral edema, hypertension, diabetes mellitus type 2, dyslipidemia, CAD, status post couple of right hip replacement surgeries and multiple dislocations in the past is coming in for left toe injury. Traumatic Left toenail avulsion Injury-around December 22 he suffered from her left great toe injury while moving some of his furniture around and he ended up stubbing his toe and led to the nail injury involving the toenail from his toe. He was seen at the local ER to have near complete avulsion of the nail of the left great toe. He reports that he has been doing well but it has been over several weeks and he has noticed that there is blackish/necrotic looking toenail that is that is regrowing on top of his cuticle although there are no signs of any edema or infection drainage purulent drainage he would was worried about being infected as it appears black/ discolored. He does have chronic thick discolored currently appearing toenails and was seeing podiatrist in the past.He is a smoker/DM/morbid obesity -multiple risk factors. Medications  Prior to Visit Medications    Medication Sig Taking?  Authorizing Provider   cyclobenzaprine (FLEXERIL) 10 MG tablet Take 1 tablet by mouth three times daily as needed for muscle spasm Yes Alina Farrell MD   Multiple Minerals-Vitamins (CALCIUM-MAGNESIUM-ZINC-D3) TABS Take by mouth Yes Historical Provider, MD   meloxicam (MOBIC) 15 MG tablet Take 1 tablet by mouth daily as needed for Pain Yes Alina Farrell MD   lisinopril-hydroCHLOROthiazide (PRINZIDE;ZESTORETIC) 20-25 MG per tablet Take 1 tablet by mouth daily Yes Alina Farrell MD   atorvastatin (LIPITOR) 40 MG tablet Take 1 tablet by mouth daily Yes Alina Farrell MD   buPROPion (WELLBUTRIN SR) 150 MG extended release tablet Take 1 tablet by mouth daily Yes Alina Farrell MD   metFORMIN (GLUCOPHAGE) 1000 MG tablet Take 1 tablet by mouth 2 times daily (with meals) Yes Alina Farrell MD   triamcinolone (ARISTOCORT) 0.5 % cream Apply topically 3 times daily. Yes Alina Farrell MD   aspirin 81 MG tablet Take 81 mg by mouth daily Yes Historical Provider, MD   docusate sodium (COLACE) 100 MG capsule TAKE ONE CAPSULE BY MOUTH TWICE DAILY Yes Historical Provider, MD   mupirocin (BACTROBAN) 2 % ointment APPLY A SMALL AMOUNT TO EACH NOSTRIL TWICE DAILY STARTING 5 DAYS PRIOR TO SURGERY Yes Historical Provider, MD   Acetaminophen (TYLENOL ARTHRITIS PAIN PO) Take 1 tablet by mouth as needed Yes Historical Provider, MD   Multiple Vitamin (MULTI-VITAMIN DAILY) TABS Take by mouth Yes Historical Provider, MD   Handicap Placard MISC by Does not apply route Yes Historical Provider, MD   CPAP Machine MISC by Does not apply route Yes Historical Provider, MD        Allergies:  has No Known Allergies. Past Medical History:   has a past medical history of Hyperlipidemia, Hypertriglyceridemia, Obesity, and Tobacco abuse. Past Surgical History   has a past surgical history that includes Abscess Drainage (08/2014); Total hip arthroplasty (Right, 12/2018); and Revision total hip arthroplasty (12/2019). Family History  family history includes Cancer in his father; Diabetes in his mother; Other in his brother.     Social History headaches. Hematological: Negative for adenopathy. Psychiatric/Behavioral: Negative for behavioral problems and suicidal ideas. The patient is not nervous/anxious. Objective:     /84 (Site: Right Upper Arm, Position: Sitting)   Pulse 117   SpO2 98%     Physical Exam  Vitals signs and nursing note reviewed. Constitutional:       Appearance: He is obese. HENT:      Head: Normocephalic and atraumatic. Cardiovascular:      Rate and Rhythm: Normal rate and regular rhythm. Pulmonary:      Breath sounds: No stridor. Musculoskeletal:      Right lower leg: Edema present. Left lower leg: Edema present. Skin:     General: Skin is warm. Findings: Lesion present. Comments: Left big toe- noticed necrotic/discolored nail bed with probable granulation tissue. No surrounding erythema/cellulitis or abscess    Pulses are hard to palpate due to edema. Neurological:      General: No focal deficit present. Mental Status: He is oriented to person, place, and time. Mental status is at baseline. Psychiatric:         Mood and Affect: Mood normal.             Assessment       Diagnosis Orders   1. Smoking  Leatha Acosta DPM, Podiatry, [place]   2. Nail avulsion, toe, sequela  Jason Shen, Podiatry, [place]         PLAN     Podiatry referral made for possible local debridement if appropriate. Educated on the fact The wound should heal within a few weeks. If completely removed, fingernails may take 6 months to grow back. Toenails may take 12 to 18 months to grow back. Injured nails may look different when they grow back  No signs of infection on my exam today. Minimize smoking education provided.       Orders Placed This Encounter   Procedures   CARLOS Rose, Podiatry, [place]     Referral Priority:   Routine     Referral Type:   Eval and Treat     Referral Reason:   Specialty Services Required     Referred to Provider:   Marbin Tello Michael Michelle DPM     Requested Specialty:   Podiatry     Number of Visits Requested:   1        No follow-ups on file. Patient given educational materials - see patient instructions. Discussed use, benefit, and side effects of prescribed medications. All patient questions answered. Pt voiced understanding. Reviewed health maintenance.        Electronically signed Oliva Magaña MD on 1/19/2021 at 11:51 AM EST

## 2021-01-21 ASSESSMENT — ENCOUNTER SYMPTOMS
ABDOMINAL PAIN: 0
COUGH: 0
CHEST TIGHTNESS: 0
DIARRHEA: 0
BLOOD IN STOOL: 0
TROUBLE SWALLOWING: 0
COLOR CHANGE: 1
RHINORRHEA: 0
SINUS PAIN: 0
SHORTNESS OF BREATH: 0
SORE THROAT: 0

## 2021-01-27 ENCOUNTER — OFFICE VISIT (OUTPATIENT)
Dept: PODIATRY | Age: 56
End: 2021-01-27
Payer: MEDICARE

## 2021-01-27 VITALS — DIASTOLIC BLOOD PRESSURE: 72 MMHG | SYSTOLIC BLOOD PRESSURE: 128 MMHG | HEART RATE: 100 BPM | RESPIRATION RATE: 24 BRPM

## 2021-01-27 DIAGNOSIS — I89.0 LYMPHEDEMA: ICD-10-CM

## 2021-01-27 DIAGNOSIS — E11.51 CONTROLLED TYPE 2 DM WITH PERIPHERAL CIRCULATORY DISORDER (HCC): ICD-10-CM

## 2021-01-27 DIAGNOSIS — S91.209S NAIL AVULSION OF TOE, SEQUELA: Primary | ICD-10-CM

## 2021-01-27 PROCEDURE — 99203 OFFICE O/P NEW LOW 30 MIN: CPT | Performed by: PODIATRIST

## 2021-01-27 PROCEDURE — 99214 OFFICE O/P EST MOD 30 MIN: CPT

## 2021-01-27 NOTE — PROGRESS NOTES
Subjective: Karley Small is a 54 y.o. male who presents to the office today complaining of injuring l big toe. Symptoms began 1 month(s) ago. Pt stubbing a table. Patient relates pain is Absent . Pain is rated 0 out of 10 and is described as none. Treatments prior to today's visit include: nail removed in ER. Used ointment at home. .  Currently denies F/C/N/V. No Known Allergies    Past Medical History:   Diagnosis Date    Hyperlipidemia     Hypertriglyceridemia     Obesity     Tobacco abuse        Prior to Admission medications    Medication Sig Start Date End Date Taking? Authorizing Provider   cyclobenzaprine (FLEXERIL) 10 MG tablet Take 1 tablet by mouth three times daily as needed for muscle spasm 12/23/20  Yes Rachel Armstrong MD   Multiple Minerals-Vitamins (CALCIUM-MAGNESIUM-ZINC-D3) TABS Take by mouth   Yes Historical Provider, MD   meloxicam (MOBIC) 15 MG tablet Take 1 tablet by mouth daily as needed for Pain 7/16/20  Yes Rachel Armstrong MD   lisinopril-hydroCHLOROthiazide (PRINZIDE;ZESTORETIC) 20-25 MG per tablet Take 1 tablet by mouth daily 7/16/20  Yes Rachel Armstrong MD   atorvastatin (LIPITOR) 40 MG tablet Take 1 tablet by mouth daily 7/16/20  Yes Rachel Armstrong MD   buPROPion [facility] SR) 150 MG extended release tablet Take 1 tablet by mouth daily 7/16/20  Yes Rachel Armstrong MD   metFORMIN (GLUCOPHAGE) 1000 MG tablet Take 1 tablet by mouth 2 times daily (with meals) 7/16/20  Yes Rachel Armstrong MD   triamcinolone (ARISTOCORT) 0.5 % cream Apply topically 3 times daily.  7/16/20  Yes Rachel Armstrong MD   aspirin 81 MG tablet Take 81 mg by mouth daily   Yes Historical Provider, MD   docusate sodium (COLACE) 100 MG capsule TAKE ONE CAPSULE BY MOUTH TWICE DAILY 12/17/19  Yes Historical Provider, MD   mupirocin (BACTROBAN) 2 % ointment APPLY A SMALL AMOUNT TO EACH NOSTRIL TWICE DAILY STARTING 5 DAYS PRIOR TO SURGERY 12/4/19  Yes Historical Provider, MD   Acetaminophen (TYLENOL ARTHRITIS PAIN PO) Take 1 tablet by mouth as needed   Yes Historical Provider, MD   Multiple Vitamin (MULTI-VITAMIN DAILY) TABS Take by mouth   Yes Historical Provider, MD   Handicap Placard MISC by Does not apply route   Yes Historical Provider, MD   CPAP Machine MISC by Does not apply route   Yes Historical Provider, MD       Past Surgical History:   Procedure Laterality Date    ABSCESS DRAINAGE  08/2014    thrombosed external hemorrhoid Dr Gabriela Caceres  12/2019    TOTAL HIP ARTHROPLASTY Right 12/2018    Dr Leslie Rose        Family History   Problem Relation Age of Onset    Diabetes Mother     Cancer Father     Other Brother         bad valve in heart       Social History     Tobacco Use    Smoking status: Current Every Day Smoker     Packs/day: 0.37     Types: Cigarettes    Smokeless tobacco: Never Used   Substance Use Topics    Alcohol use: No       ROS: All 14 ROS systems reviewed and pertinent positives noted above, all others negative. Lower Extremity Physical Examination:     Vitals:   Vitals:    01/27/21 1441   BP: 128/72   Pulse: 100   Resp: 24     General: AAO x 3 in NAD. Vascular: DP and PT pulses palpable 2/4, bilateral.  CFT <3 seconds, bilateral.  Hair growth absent to the level of the digits, bilateral.  Edema present, bilateral.  Varicosities present, bilateral. Erythema absent, bilateral. Distal Rubor absent bilateral.  Temperature decreased bilateral. Hyperpigmentation present bilateral. Atrophic skin mp  Neurological: Sensation intact to light touch to level of digits, bilateral.  Protective sensation intact 10/10 sites via 5.07/10g Robinson-Matilde Monofilament, bilateral.  negative Tinel's, bilateral.  negative Valleix sign, bilateral.  Vibratory intact bilateral.  Reflexes Decreased bilateral.  Paresthesias negative. Dysthesias negative.   Sharp/dull intact bilateral.    Musculoskeletal: Muscle strength 5/5, bilateral.  Pain absent upon palpation bilateral. Normal medial longitudinal arch, bilateral.  Ankle ROM within normal limits,bilateral.  1st MPJ ROM decreased, bilateral.  Dorsally contracted digits absent. No other foot deformities. Integument: Warm, dry, supple, bilateral.  Open lesion absent, bilateral.  Extensive nonviable tissue left hallux nail bed. History of traumatic nail avulsion. No current signs of paronychia. No wounds upon debridement of nonviable tissue. Interdigital maceration absent to web spaces bilateral.  Nails mycotic. Windy Braden Fissures absent, bilateral. Hyperkeratotic tissue is absent. Asessment: Patient is a 54 y.o. male with:    Diagnosis Orders   1. Nail avulsion of toe, sequela     2. Controlled type 2 DM with peripheral circulatory disorder (HCC)     3. Lymphedema         Plan: Patient examined and evaluated. Current condition and treatment options discussed in detail. For the lymphedema issue, pt should perform regular exercise, elevate his legs, and continue regular use of compression stockings long term. All nonviable tissue debrided left hallux. The wound underlying. No further treatment needed for that. Patient to use over-the-counter lotion daily. DM foot ed and exam  Patient has a low level medical decision making this visit. Further testing ordered. Low risk of morbidity from current treatment course.    smoking cessation advice given. Importance in relation to wound healing discussed. Pt should follow with PCP for potential assistance in quitting. Contact office with any questions/problems/concerns.   RTC in PRN

## 2021-02-02 DIAGNOSIS — R21 RASH OF BACK: ICD-10-CM

## 2021-02-02 DIAGNOSIS — J42 CHRONIC BRONCHITIS, UNSPECIFIED CHRONIC BRONCHITIS TYPE (HCC): ICD-10-CM

## 2021-02-02 DIAGNOSIS — I10 ESSENTIAL HYPERTENSION: ICD-10-CM

## 2021-02-02 DIAGNOSIS — M26.629 TEMPOROMANDIBULAR JOINT-PAIN-DYSFUNCTION SYNDROME (TMJ): ICD-10-CM

## 2021-02-02 DIAGNOSIS — I87.2 VENOUS INSUFFICIENCY (CHRONIC) (PERIPHERAL): ICD-10-CM

## 2021-02-02 DIAGNOSIS — E78.2 MIXED HYPERLIPIDEMIA: ICD-10-CM

## 2021-02-02 DIAGNOSIS — I89.0 LYMPHEDEMA OF BOTH LOWER EXTREMITIES: ICD-10-CM

## 2021-02-02 DIAGNOSIS — F17.200 SMOKING: ICD-10-CM

## 2021-02-02 DIAGNOSIS — M19.90 ARTHRITIS: ICD-10-CM

## 2021-02-02 DIAGNOSIS — E66.01 MORBID OBESITY (HCC): ICD-10-CM

## 2021-02-02 DIAGNOSIS — Z99.89 OSA ON CPAP: ICD-10-CM

## 2021-02-02 DIAGNOSIS — M16.11 PRIMARY OSTEOARTHRITIS OF RIGHT HIP: ICD-10-CM

## 2021-02-02 DIAGNOSIS — E11.8 TYPE 2 DIABETES MELLITUS WITH COMPLICATION, WITHOUT LONG-TERM CURRENT USE OF INSULIN (HCC): ICD-10-CM

## 2021-02-02 DIAGNOSIS — G47.33 OSA ON CPAP: ICD-10-CM

## 2021-02-02 DIAGNOSIS — M62.830 BACK MUSCLE SPASM: ICD-10-CM

## 2021-02-02 RX ORDER — CYCLOBENZAPRINE HCL 10 MG
TABLET ORAL
Qty: 90 TABLET | Refills: 0 | Status: SHIPPED | OUTPATIENT
Start: 2021-02-02 | End: 2021-03-23

## 2021-02-02 NOTE — TELEPHONE ENCOUNTER
Tor Rdz is requesting a refill on the following medication(s):  Requested Prescriptions     Pending Prescriptions Disp Refills    cyclobenzaprine (FLEXERIL) 10 MG tablet [Pharmacy Med Name: Cyclobenzaprine HCl 10 MG Oral Tablet] 90 tablet 0     Sig: Take 1 tablet by mouth three times daily as needed for muscle spasm       Last Visit Date (If Applicable):  3/91/8476    Next Visit Date:    Visit date not found

## 2021-03-16 ENCOUNTER — TELEPHONE (OUTPATIENT)
Dept: FAMILY MEDICINE CLINIC | Age: 56
End: 2021-03-16

## 2021-03-16 NOTE — TELEPHONE ENCOUNTER
Patient's wife contacted the office wanting to wanting to know if it's safe for patient to receive the covid vaccine with his current medical conditions.

## 2021-03-23 DIAGNOSIS — R21 RASH OF BACK: ICD-10-CM

## 2021-03-23 DIAGNOSIS — E66.01 MORBID OBESITY (HCC): ICD-10-CM

## 2021-03-23 DIAGNOSIS — M26.629 TEMPOROMANDIBULAR JOINT-PAIN-DYSFUNCTION SYNDROME (TMJ): ICD-10-CM

## 2021-03-23 DIAGNOSIS — I89.0 LYMPHEDEMA OF BOTH LOWER EXTREMITIES: ICD-10-CM

## 2021-03-23 DIAGNOSIS — G47.33 OSA ON CPAP: ICD-10-CM

## 2021-03-23 DIAGNOSIS — M16.11 PRIMARY OSTEOARTHRITIS OF RIGHT HIP: ICD-10-CM

## 2021-03-23 DIAGNOSIS — I10 ESSENTIAL HYPERTENSION: ICD-10-CM

## 2021-03-23 DIAGNOSIS — M62.830 BACK MUSCLE SPASM: ICD-10-CM

## 2021-03-23 DIAGNOSIS — E11.8 TYPE 2 DIABETES MELLITUS WITH COMPLICATION, WITHOUT LONG-TERM CURRENT USE OF INSULIN (HCC): ICD-10-CM

## 2021-03-23 DIAGNOSIS — F17.200 SMOKING: ICD-10-CM

## 2021-03-23 DIAGNOSIS — E78.2 MIXED HYPERLIPIDEMIA: ICD-10-CM

## 2021-03-23 DIAGNOSIS — J42 CHRONIC BRONCHITIS, UNSPECIFIED CHRONIC BRONCHITIS TYPE (HCC): ICD-10-CM

## 2021-03-23 DIAGNOSIS — M19.90 ARTHRITIS: ICD-10-CM

## 2021-03-23 DIAGNOSIS — Z99.89 OSA ON CPAP: ICD-10-CM

## 2021-03-23 DIAGNOSIS — I87.2 VENOUS INSUFFICIENCY (CHRONIC) (PERIPHERAL): ICD-10-CM

## 2021-03-23 RX ORDER — CYCLOBENZAPRINE HCL 10 MG
TABLET ORAL
Qty: 90 TABLET | Refills: 0 | Status: SHIPPED | OUTPATIENT
Start: 2021-03-23 | End: 2021-05-26

## 2021-03-23 NOTE — TELEPHONE ENCOUNTER
Nathaly Azevedo is requesting a refill on the following medication(s):  Requested Prescriptions     Pending Prescriptions Disp Refills    cyclobenzaprine (FLEXERIL) 10 MG tablet [Pharmacy Med Name: Cyclobenzaprine HCl 10 MG Oral Tablet] 90 tablet 0     Sig: Take 1 tablet by mouth three times daily as needed for muscle spasm       Last Visit Date (If Applicable):  5/61/5376    Next Visit Date:    Visit date not found

## 2021-03-31 ENCOUNTER — TELEPHONE (OUTPATIENT)
Dept: FAMILY MEDICINE CLINIC | Age: 56
End: 2021-03-31

## 2021-04-12 ENCOUNTER — HOSPITAL ENCOUNTER (OUTPATIENT)
Dept: WOUND CARE | Age: 56
Discharge: HOME | End: 2021-04-13
Payer: MEDICARE

## 2021-04-12 VITALS
HEART RATE: 82 BPM | WEIGHT: 315 LBS | BODY MASS INDEX: 41.75 KG/M2 | RESPIRATION RATE: 20 BRPM | SYSTOLIC BLOOD PRESSURE: 138 MMHG | DIASTOLIC BLOOD PRESSURE: 88 MMHG | HEIGHT: 73 IN | TEMPERATURE: 98.2 F

## 2021-04-12 DIAGNOSIS — L03.116 CELLULITIS OF LEFT LOWER EXTREMITY: ICD-10-CM

## 2021-04-12 DIAGNOSIS — I89.0 LYMPHEDEMA OF BOTH LOWER EXTREMITIES: ICD-10-CM

## 2021-04-12 DIAGNOSIS — L97.921 SKIN ULCER OF LEFT LOWER LEG, LIMITED TO BREAKDOWN OF SKIN (HCC): ICD-10-CM

## 2021-04-12 PROCEDURE — 99213 OFFICE O/P EST LOW 20 MIN: CPT | Performed by: PODIATRIST

## 2021-04-12 PROCEDURE — 99214 OFFICE O/P EST MOD 30 MIN: CPT

## 2021-04-12 PROCEDURE — 29580 STRAPPING UNNA BOOT: CPT | Performed by: PODIATRIST

## 2021-04-12 PROCEDURE — 29580 STRAPPING UNNA BOOT: CPT

## 2021-04-12 NOTE — PROGRESS NOTES
[employer] Application   Below Knee    NAME:  Scottie Perdomo  YOB: 1965  MEDICAL RECORD NUMBER:  [id number]  DATE:  4/12/2021    Autumn Chen boot: Applied moisturizing agent to dry skin as needed. Appied primary and secondary dressing as ordered. Applied Unna roll from toes to knee overlapping each time. Applied ace wrap or coban from toes to below the knee. Instructed patient/caregiver to keep dressing dry and intact. DO NOT REMOVE DRESSING. Instructed pt/family/caregiver to report excessive draining, loose bandage, wet dressing, severe pain or tingling in toes. Applied [employer] dressing below the knee to left lower leg. Unna Boot(s) were applied per  Guidelines.      Electronically signed by Freya Gomez RN on 4/12/2021 at 11:46 AM

## 2021-04-12 NOTE — PROGRESS NOTES
Subjective: Terrell Doan is a 54 y.o. male who presents with the ulceration of the L leg. Patient has not been compliant with compression therapy. Co ban only l leg. Patient relates pain is Absent . Pain is rated 0 out of 10 and is described as none. Currently denies F/C/N/V. Patient recently hospital due to his infection. Patient states is definitely look improved but leg is not healed up yet. No Known Allergies    Past Medical History:   Diagnosis Date    Hyperlipidemia     Hypertriglyceridemia     Obesity     Tobacco abuse        Prior to Admission medications    Medication Sig Start Date End Date Taking? Authorizing Provider   cyclobenzaprine (FLEXERIL) 10 MG tablet Take 1 tablet by mouth three times daily as needed for muscle spasm 3/23/21  Yes Jessica Malloy MD   Multiple Minerals-Vitamins (CALCIUM-MAGNESIUM-ZINC-D3) TABS Take by mouth   Yes Historical Provider, MD   meloxicam (MOBIC) 15 MG tablet Take 1 tablet by mouth daily as needed for Pain 7/16/20  Yes Jessica Malloy MD   lisinopril-hydroCHLOROthiazide (PRINZIDE;ZESTORETIC) 20-25 MG per tablet Take 1 tablet by mouth daily 7/16/20  Yes Jessica Malloy MD   atorvastatin (LIPITOR) 40 MG tablet Take 1 tablet by mouth daily 7/16/20  Yes Jessica Malloy MD   buPROPion [facility] SR) 150 MG extended release tablet Take 1 tablet by mouth daily 7/16/20  Yes Jessica Malloy MD   metFORMIN (GLUCOPHAGE) 1000 MG tablet Take 1 tablet by mouth 2 times daily (with meals) 7/16/20  Yes Jessica Malloy MD   triamcinolone (ARISTOCORT) 0.5 % cream Apply topically 3 times daily.  7/16/20  Yes Jessica Malloy MD   aspirin 81 MG tablet Take 81 mg by mouth daily   Yes Historical Provider, MD   docusate sodium (COLACE) 100 MG capsule TAKE ONE CAPSULE BY MOUTH TWICE DAILY 12/17/19  Yes Historical Provider, MD   mupirocin (BACTROBAN) 2 % ointment APPLY A SMALL AMOUNT TO EACH NOSTRIL TWICE DAILY STARTING 5 DAYS PRIOR TO SURGERY 12/4/19  Yes Historical Provider, MD Acetaminophen (TYLENOL ARTHRITIS PAIN PO) Take 1 tablet by mouth as needed   Yes Historical Provider, MD   Multiple Vitamin (MULTI-VITAMIN DAILY) TABS Take by mouth   Yes Historical Provider, MD   Handicap Placard MISC by Does not apply route   Yes Historical Provider, MD   CPAP Machine MISC by Does not apply route   Yes Historical Provider, MD       Social History     Tobacco Use    Smoking status: Current Every Day Smoker     Packs/day: 0.37     Types: Cigarettes    Smokeless tobacco: Never Used   Substance Use Topics    Alcohol use: No       Review of Systems: All 12 systems reviewed and pertinent positives noted above. Lower Extremity Physical Examination:     Vitals:   Vitals:    04/12/21 1131   BP: 138/88   Pulse: 82   Resp: 20   Temp: 98.2 °F (36.8 °C)     General: AAO x 3 in NAD. Vascular: DP and PT pulses palpable 2/4, bilateral.  CFT <3 seconds, bilateral.  Hair growth absent to the level of the digits, bilateral.  Edema present, bilateral.  Varicosities present, bilateral. Erythema absent, bilateral. Distal Rubor absent bilateral.  Temperature decreased bilateral. Hyperpigmentation present bilateral. Atrophic skin no    Neurological: Sensation intact to light touch to level of digits, bilateral.  Protective sensation intact 10/10 sites via 5.07/10g [place]-Matilde Monofilament, bilateral.  negative Tinel's, bilateral.  negative Valleix sign, bilateral.  Vibratory intact bilateral.  Reflexes Decreased bilateral.  Paresthesias negative. Dysthesias negative. Sharp/dull intact bilateral.    Musculoskeletal: Muscle strength 5/5, bilateral.  Pain absent upon palpation bilateral. Normal medial longitudinal arch, bilateral.  Ankle ROM decreasd,bilateral.  1st MPJ ROM within normal limits, bilateral.  Dorsally contracted digits absent. No other foot deformities. Integument:   Open lesion present, Left. Seen anterior to anterolateral left leg multiple raw areas only with positive serous drainage. abx  Today's dressing: Other: max absorbent. For the lymphedema issue, pt should perform regular exercise, elevate his legs, and continue regular use of compression stockings long term. Contact clinic with any questions/problems/concerns or new signs of infection. Follow-up at [facility] in 1week(s).

## 2021-04-12 NOTE — PLAN OF CARE
Problem: Wound:  Goal: Will show signs of wound healing; wound closure and no evidence of infection  Description: Will show signs of wound healing; wound closure and no evidence of infection  Outcome: Ongoing     Problem: Venous:  Goal: Signs of wound healing will improve  Description: Signs of wound healing will improve  Outcome: Ongoing     Problem: Smoking cessation:  Goal: Ability to formulate a plan to maintain a tobacco-free life will be supported  Description: Ability to formulate a plan to maintain a tobacco-free life will be supported  Outcome: Ongoing     Problem: Compression therapy:  Goal: Will be free from complications associated with compression therapy  Description: Will be free from complications associated with compression therapy  Outcome: Ongoing     Problem: Weight control:  Goal: Ability to maintain an optimal weight for height and age will be supported  Description: Ability to maintain an optimal weight for height and age will be supported  Outcome: Ongoing     Problem: Falls - Risk of:  Goal: Will remain free from falls  Description: Will remain free from falls  Outcome: Ongoing     Problem: Blood Glucose:  Goal: Ability to maintain appropriate glucose levels will improve  Description: Ability to maintain appropriate glucose levels will improve  Outcome: Ongoing

## 2021-04-13 ENCOUNTER — TELEPHONE (OUTPATIENT)
Dept: FAMILY MEDICINE CLINIC | Age: 56
End: 2021-04-13

## 2021-04-14 ENCOUNTER — TELEPHONE (OUTPATIENT)
Dept: WOUND CARE | Age: 56
End: 2021-04-14

## 2021-04-14 NOTE — TELEPHONE ENCOUNTER
Vivian Kat from [facility] called and states that patient is having increased drainage from his leg wound and that it drained through his unna boot. She had to go into the home and replace the dressing, she applied an Optilock and unna boot. Could they please have an order for PRN dressing changes?     Phone: [phone]  Fax: [phone]

## 2021-04-14 NOTE — TELEPHONE ENCOUNTER
[facility] Trans[facility] Initial Follow Up Call    Outreach made within 2 business days of discharge no    Patient: Mark Roche Patient : 1965   MRN: [id number]  Reason for Admission: No discharge information exists for this patient. Discharge Date:         Spoke with: pts wife    Discharge department/facility: [facility]    TCM Interactive Patient Contact:  Was patient able to fill all prescriptions: Yes  Was patient instructed to bring all medications to the follow-up visit: Yes  Is patient taking all medications as directed in the discharge summary?  Yes  Does patient understand their discharge instructions: Yes  Does patient have questions or concerns that need addressed prior to 7-14 day follow up office visit: no    Scheduled appointment with PCP within 7-14 days    Follow Up  Future Appointments   Date Time Provider Elisa Valencia   4/15/2021  9:30 AM [facility]   2021 10:30 AM CARLOS Naqvi [address] [place] [address]   2021  1:45 PM Derke Meyers DPM [facility]       Norris Fry MA

## 2021-04-15 ENCOUNTER — OFFICE VISIT (OUTPATIENT)
Dept: FAMILY MEDICINE CLINIC | Age: 56
End: 2021-04-15
Payer: MEDICARE

## 2021-04-15 VITALS — DIASTOLIC BLOOD PRESSURE: 76 MMHG | HEART RATE: 102 BPM | SYSTOLIC BLOOD PRESSURE: 132 MMHG | OXYGEN SATURATION: 99 %

## 2021-04-15 DIAGNOSIS — L03.116 CELLULITIS OF LEFT LOWER EXTREMITY: ICD-10-CM

## 2021-04-15 DIAGNOSIS — F17.200 SMOKER: ICD-10-CM

## 2021-04-15 DIAGNOSIS — I89.0 LYMPHEDEMA OF BOTH LOWER EXTREMITIES: ICD-10-CM

## 2021-04-15 DIAGNOSIS — E66.01 MORBID OBESITY (HCC): ICD-10-CM

## 2021-04-15 DIAGNOSIS — E11.8 TYPE 2 DIABETES MELLITUS WITH COMPLICATION, WITHOUT LONG-TERM CURRENT USE OF INSULIN (HCC): Primary | ICD-10-CM

## 2021-04-15 LAB
ALBUMIN SERPL-MCNC: NORMAL G/DL
ALP BLD-CCNC: NORMAL U/L
ALT SERPL-CCNC: NORMAL U/L
ANION GAP SERPL CALCULATED.3IONS-SCNC: 15.5 MMOL/L
AST SERPL-CCNC: NORMAL U/L
BILIRUB SERPL-MCNC: NORMAL MG/DL
BUN BLDV-MCNC: 21 MG/DL
CALCIUM SERPL-MCNC: 9.4 MG/DL
CHLORIDE BLD-SCNC: 4.5 MMOL/L
CO2: 30 MMOL/L
CREAT SERPL-MCNC: 0.9 MG/DL
GFR CALCULATED: >60
GLUCOSE BLD-MCNC: 184 MG/DL
POTASSIUM SERPL-SCNC: 92 MMOL/L
SODIUM BLD-SCNC: 133 MMOL/L
TOTAL PROTEIN: NORMAL
VANCOMYCIN TROUGH: 20.18

## 2021-04-15 PROCEDURE — 99214 OFFICE O/P EST MOD 30 MIN: CPT | Performed by: INTERNAL MEDICINE

## 2021-04-15 PROCEDURE — 99212 OFFICE O/P EST SF 10 MIN: CPT | Performed by: INTERNAL MEDICINE

## 2021-04-15 PROCEDURE — 1111F DSCHRG MED/CURRENT MED MERGE: CPT | Performed by: INTERNAL MEDICINE

## 2021-04-15 PROCEDURE — 99213 OFFICE O/P EST LOW 20 MIN: CPT

## 2021-04-15 RX ORDER — LISINOPRIL 20 MG/1
TABLET ORAL
COMMUNITY
Start: 2021-03-30 | End: 2021-05-14 | Stop reason: SDUPTHER

## 2021-04-15 RX ORDER — FUROSEMIDE 40 MG/1
TABLET ORAL
COMMUNITY
Start: 2021-04-09

## 2021-04-15 RX ORDER — POTASSIUM CHLORIDE 1500 MG/1
TABLET, FILM COATED, EXTENDED RELEASE ORAL
COMMUNITY
Start: 2021-04-10

## 2021-04-15 RX ORDER — NYSTATIN 100000 [USP'U]/G
POWDER TOPICAL
COMMUNITY
Start: 2021-03-30

## 2021-04-15 ASSESSMENT — PATIENT HEALTH QUESTIONNAIRE - PHQ9
SUM OF ALL RESPONSES TO PHQ9 QUESTIONS 1 & 2: 0
SUM OF ALL RESPONSES TO PHQ QUESTIONS 1-9: 0
SUM OF ALL RESPONSES TO PHQ QUESTIONS 1-9: 0
2. FEELING DOWN, DEPRESSED OR HOPELESS: 0
SUM OF ALL RESPONSES TO PHQ QUESTIONS 1-9: 0

## 2021-04-15 NOTE — PROGRESS NOTES
[address]  [address]  Dept: [phone]  Dept Fax: [phone]: [phone]     Visit Date:  4/15/2021    Patient:  Beryle Carton  YOB: 1965    HPI:   Beryle Carton presents today for   Chief Complaint   Patient presents with    Follow-Up from [address] 4/5/21-cellulitis   . HPI  75-year-old male with a history of morbid obesity with a BMI of 61, TEOFILO on CPAP, pulmonary hypertension, chronic bronchitis with emphysema, smoking history currently half a pack a day, chronic venous insufficiency/peripheral edema/stasis ulcers/diabetic ulcers/lymphedema, hypertension, diabetes mellitus type 2, dyslipidemia, CAD, status post couple of right hip replacement surgeries and multiple dislocations in the past is coming in for hospital follow up. Hospital Follow up on Left lower extremity cellulitis-Although he does feel tired but denies any fever chills worsening redness swelling or wounds or ulcers of the lower extremity. Please note he had all the dressings on he does have wound care come in and check on him antibiotic basis and has a wound care appointment on Monday. No dressings were taken off since we do not have any wound care supplies in our office today. Although I did check his right lower extremity that has been the onset of a skin tear with no signs of infection or ulceration or nonhealing wounds as well as chronic lymphedema related skin changes. Uncontrolled DM type 2-A1C>9 noticed on recent Hospital visit. On Metformin 1000 mg BID. No other oral hypoglycemic agents. Patient is reluctant to get started on any injectable forms of insulin today. Abnormal Stress test- Denies any cardiopulmonary related complaints today. Deferred invasive cardiac work up. Was started on Lasix 40 mg BID with potassium.          Medications  Prior to Visit Medications Medication Sig Taking? Authorizing Provider   cyclobenzaprine (FLEXERIL) 10 MG tablet Take 1 tablet by mouth three times daily as needed for muscle spasm  Emelina Katz MD   Multiple Minerals-Vitamins (CALCIUM-MAGNESIUM-ZINC-D3) TABS Take by mouth  Historical Provider, MD   meloxicam (MOBIC) 15 MG tablet Take 1 tablet by mouth daily as needed for Pain  Emelina Katz MD   lisinopril-hydroCHLOROthiazide (PRINZIDE;ZESTORETIC) 20-25 MG per tablet Take 1 tablet by mouth daily  Emelina Katz MD   atorvastatin (LIPITOR) 40 MG tablet Take 1 tablet by mouth daily  Emelina Katz MD   [facility]ion [facility]) 150 MG extended release tablet Take 1 tablet by mouth daily  Emelina Katz MD   metFORMIN (GLUCOPHAGE) 1000 MG tablet Take 1 tablet by mouth 2 times daily (with meals)  Emelina Katz MD   triamcinolone (ARISTOCORT) 0.5 % cream Apply topically 3 times daily. Emelina Katz MD   aspirin 81 MG tablet Take 81 mg by mouth daily  Historical Provider, MD   docusate sodium (COLACE) 100 MG capsule TAKE ONE CAPSULE BY MOUTH TWICE DAILY  Historical Provider, MD   mupirocin (BACTROBAN) 2 % ointment APPLY A SMALL AMOUNT TO EACH NOSTRIL TWICE DAILY STARTING 5 DAYS PRIOR TO SURGERY  Historical Provider, MD   Acetaminophen (TYLENOL ARTHRITIS PAIN PO) Take 1 tablet by mouth as needed  Historical Provider, MD   Multiple Vitamin (MULTI-VITAMIN DAILY) TABS Take by mouth  Historical Provider, MD   Handicap Placard MISC by Does not apply route  Historical Provider, MD   CPAP Machine MISC by Does not apply route  Historical Provider, MD        Allergies:  has No Known Allergies. Past Medical History:   has a past medical history of Hyperlipidemia, Hypertriglyceridemia, Obesity, and Tobacco abuse. Past Surgical History   has a past surgical history that includes Abscess Drainage (08/2014); Total hip arthroplasty (Right, 12/2018); and Revision total hip arthroplasty (12/2019).     Family History  family history includes Cancer in his father; Diabetes in his mother; Other in his brother. Social History   reports that he has been smoking cigarettes. He has been smoking about 0.37 packs per day. He has never used smokeless tobacco. He reports previous drug use. He reports that he does not drink alcohol. Health Maintenance:    Health Maintenance   Topic Date Due    Pneumococcal 0-64 years Vaccine (1 of 1 - PPSV23) Never done    COVID-19 Vaccine (1) Never done    Hepatitis B vaccine (1 of 3 - Risk 3-dose series) Never done    Shingles Vaccine (1 of 2) Never done    Colon cancer screen colonoscopy  Never done    DTaP/Tdap/Td vaccine (2 - Td) 07/13/2017    Annual Wellness Visit (AWV)  Never done    Diabetic microalbuminuria test  07/29/2020    Diabetic foot exam  01/28/2021    HIV screen  10/17/2027 (Originally 6/29/1980)    Lipid screen  08/07/2021    Potassium monitoring  08/07/2021    Creatinine monitoring  08/07/2021    Flu vaccine (Season Ended) 09/01/2021    A1C test (Diabetic or Prediabetic)  10/15/2021    Diabetic retinal exam  03/18/2022    Hepatitis C screen  Completed    Hepatitis A vaccine  Aged Out    Hib vaccine  Aged Out    Meningococcal (ACWY) vaccine  Aged Out       Subjective:      Review of Systems   Constitutional: Positive for fatigue. Negative for fever and unexpected weight change. HENT: Negative for ear pain, postnasal drip, rhinorrhea, sinus pain, sore throat and trouble swallowing. Eyes: Negative for visual disturbance. Respiratory: Negative for cough, chest tightness and shortness of breath. Cardiovascular: Negative for chest pain and leg swelling. Gastrointestinal: Negative for abdominal pain, blood in stool and diarrhea. Endocrine: Negative for polyuria. Genitourinary: Negative for difficulty urinating and flank pain. Musculoskeletal: Positive for arthralgias and myalgias. Negative for joint swelling. Skin: Positive for color change, rash and wound. Allergic/Immunologic: Negative for environmental allergies. Neurological: Negative for weakness, light-headedness, numbness and headaches. Hematological: Negative for adenopathy. Psychiatric/Behavioral: Negative for behavioral problems and suicidal ideas. The patient is not nervous/anxious. Objective:     /76 (Site: Right Upper Arm, Position: Sitting)   Pulse 102   SpO2 99%     Physical Exam  Vitals signs and nursing note reviewed. Constitutional:       Appearance: He is obese. Comments: On a wheelchair   HENT:      Head: Normocephalic and atraumatic. Cardiovascular:      Rate and Rhythm: Normal rate. Comments: Distant heart sounds  Pulmonary:      Breath sounds: No stridor. Comments: Decreased breath sounds all over likely due to body habitus  Musculoskeletal:      Right lower leg: Edema present. Left lower leg: Edema present. Comments: Lymphedema/ace wraps with dressings   RLE- skin tear with clear fluid draining noticed. No evidence of ulcer/wound/or infecting looking wounds. Left lower extremity- not examined. Skin:     General: Skin is warm. Neurological:      Mental Status: He is oriented to person, place, and time. Mental status is at baseline. Psychiatric:         Mood and Affect: Mood normal.             Assessment       Diagnosis Orders   1. Type 2 diabetes mellitus with complication, without long-term current use of insulin ([address]     2. Morbid obesity ([address]     3. Cellulitis of left lower extremity     4. Lymphedema of both lower extremities     5. Smoker           PLAN   Will start him on Januvia for better control of diabetes. Need to  the diabetic supplies from the pharmacy. Reluctant to try any injectable forms of Insulin. Should check electrolytes CMP next visit. 1 MONTH follow up. Discussed little about the weight loss medications.   Does not want to try the bariartic surgery as he has seen his family members have severe

## 2021-04-19 ENCOUNTER — HOSPITAL ENCOUNTER (OUTPATIENT)
Dept: WOUND CARE | Age: 56
Discharge: HOME | End: 2021-04-20
Payer: MEDICARE

## 2021-04-19 VITALS
TEMPERATURE: 97.9 F | SYSTOLIC BLOOD PRESSURE: 134 MMHG | BODY MASS INDEX: 41.75 KG/M2 | RESPIRATION RATE: 22 BRPM | HEIGHT: 73 IN | WEIGHT: 315 LBS | DIASTOLIC BLOOD PRESSURE: 80 MMHG | HEART RATE: 120 BPM

## 2021-04-19 DIAGNOSIS — L97.921 SKIN ULCER OF LEFT LOWER LEG, LIMITED TO BREAKDOWN OF SKIN (HCC): ICD-10-CM

## 2021-04-19 PROCEDURE — 29580 STRAPPING UNNA BOOT: CPT | Performed by: PODIATRIST

## 2021-04-19 PROCEDURE — 99214 OFFICE O/P EST MOD 30 MIN: CPT

## 2021-04-19 PROCEDURE — 99213 OFFICE O/P EST LOW 20 MIN: CPT | Performed by: PODIATRIST

## 2021-04-19 RX ORDER — BACITRACIN, NEOMYCIN, POLYMYXIN B 400; 3.5; 5 [USP'U]/G; MG/G; [USP'U]/G
OINTMENT TOPICAL ONCE
Status: CANCELLED | OUTPATIENT
Start: 2021-04-19 | End: 2021-04-19

## 2021-04-19 RX ORDER — SULFAMETHOXAZOLE AND TRIMETHOPRIM 800; 160 MG/1; MG/1
1 TABLET ORAL 2 TIMES DAILY
Qty: 20 TABLET | Refills: 0 | Status: SHIPPED | OUTPATIENT
Start: 2021-04-19 | End: 2021-04-29

## 2021-04-19 RX ORDER — LIDOCAINE 50 MG/G
OINTMENT TOPICAL ONCE
Status: CANCELLED | OUTPATIENT
Start: 2021-04-19 | End: 2021-04-19

## 2021-04-19 RX ORDER — GENTAMICIN SULFATE 1 MG/G
OINTMENT TOPICAL ONCE
Status: CANCELLED | OUTPATIENT
Start: 2021-04-19 | End: 2021-04-19

## 2021-04-19 RX ORDER — GINSENG 100 MG
CAPSULE ORAL ONCE
Status: CANCELLED | OUTPATIENT
Start: 2021-04-19 | End: 2021-04-19

## 2021-04-19 RX ORDER — BACITRACIN ZINC AND POLYMYXIN B SULFATE 500; 1000 [USP'U]/G; [USP'U]/G
OINTMENT TOPICAL ONCE
Status: CANCELLED | OUTPATIENT
Start: 2021-04-19 | End: 2021-04-19

## 2021-04-19 RX ORDER — BETAMETHASONE DIPROPIONATE 0.05 %
OINTMENT (GRAM) TOPICAL ONCE
Status: CANCELLED | OUTPATIENT
Start: 2021-04-19 | End: 2021-04-19

## 2021-04-19 RX ORDER — LIDOCAINE 40 MG/G
CREAM TOPICAL ONCE
Status: CANCELLED | OUTPATIENT
Start: 2021-04-19 | End: 2021-04-19

## 2021-04-19 RX ORDER — CLOBETASOL PROPIONATE 0.5 MG/G
OINTMENT TOPICAL ONCE
Status: CANCELLED | OUTPATIENT
Start: 2021-04-19 | End: 2021-04-19

## 2021-04-19 RX ORDER — LIDOCAINE HYDROCHLORIDE 40 MG/ML
SOLUTION TOPICAL ONCE
Status: CANCELLED | OUTPATIENT
Start: 2021-04-19 | End: 2021-04-19

## 2021-04-19 RX ORDER — LIDOCAINE HYDROCHLORIDE 20 MG/ML
JELLY TOPICAL ONCE
Status: CANCELLED | OUTPATIENT
Start: 2021-04-19 | End: 2021-04-19

## 2021-04-19 ASSESSMENT — ENCOUNTER SYMPTOMS
BLOOD IN STOOL: 0
TROUBLE SWALLOWING: 0
CHEST TIGHTNESS: 0
ABDOMINAL PAIN: 0
SHORTNESS OF BREATH: 0
COUGH: 0
SORE THROAT: 0
COLOR CHANGE: 1
DIARRHEA: 0
RHINORRHEA: 0
SINUS PAIN: 0

## 2021-04-19 ASSESSMENT — PAIN SCALES - GENERAL: PAINLEVEL_OUTOF10: 0

## 2021-04-19 NOTE — PLAN OF CARE
Problem: Wound:  Goal: Will show signs of wound healing; wound closure and no evidence of infection  Description: Will show signs of wound healing; wound closure and no evidence of infection  Outcome: Ongoing     Problem: Venous:  Goal: Signs of wound healing will improve  Description: Signs of wound healing will improve  Outcome: Ongoing     Problem: Smoking cessation:  Goal: Ability to formulate a plan to maintain a tobacco-free life will be supported  Description: Ability to formulate a plan to maintain a tobacco-free life will be supported  Outcome: Ongoing     Problem: Compression therapy:  Goal: Will be free from complications associated with compression therapy  Description: Will be free from complications associated with compression therapy  Outcome: Ongoing     Problem: Weight control:  Goal: Ability to maintain an optimal weight for height and age will be supported  Description: Ability to maintain an optimal weight for height and age will be supported  Outcome: Ongoing     Problem: Falls - Risk of:  Goal: Will remain free from falls  Description: Will remain free from falls  Outcome: Ongoing

## 2021-04-19 NOTE — PROGRESS NOTES
Subjective: Terrell Doan is a 54 y.o. male who presents with the ulceration of the L leg. Patient has been compliant with compression therapy. Patient has questions why he needs to continue the antibiotics. Patient tolerated the compression wrap well. . Patient relates pain is Absent . Pain is rated 0 out of 10 and is described as none. Currently denies F/C/N/V. No Known Allergies    Past Medical History:   Diagnosis Date    Hyperlipidemia     Hypertriglyceridemia     Obesity     Tobacco abuse        Prior to Admission medications    Medication Sig Start Date End Date Taking?  Authorizing Provider   sulfamethoxazole-trimethoprim (BACTRIM DS) 800-160 MG per tablet Take 1 tablet by mouth 2 times daily for 10 days 4/19/21 4/29/21 Yes Shanique Rasmussen DPM   SILVER SULFADIAZINE EX silver sulfADIAZINE Silver Sulfadiazine Active 1 APPLICATIO TP TWICE A DAY 25 March 30th, 2021 10:11am 03-  [facility] (91141) 3/30/21  Yes Historical Provider, MD   NYSTATIN 344394 UNIT/GM powder  3/30/21  Yes Historical Provider, MD   potassium chloride (KLOR-CON M) 20 MEQ TBCR extended release tablet  4/10/21  Yes Historical Provider, MD   lisinopril (PRINIVIL;ZESTRIL) 20 MG tablet  3/30/21  Yes Historical Provider, MD   furosemide (LASIX) 40 MG tablet TAKE 1 TABLET BY MOUTH TWICE DAILY 4/9/21  Yes Historical Provider, MD   SITagliptin (JANUVIA) 100 MG tablet Take 1 tablet by mouth daily 4/15/21  Yes Jessica Malloy MD   cyclobenzaprine (FLEXERIL) 10 MG tablet Take 1 tablet by mouth three times daily as needed for muscle spasm 3/23/21  Yes Jessica Malloy MD   Multiple Minerals-Vitamins (CALCIUM-MAGNESIUM-ZINC-D3) TABS Take by mouth   Yes Historical Provider, MD   meloxicam (MOBIC) 15 MG tablet Take 1 tablet by mouth daily as needed for Pain 7/16/20  Yes Jessica Malloy MD   atorvastatin (LIPITOR) 40 MG tablet Take 1 tablet by mouth daily 7/16/20  Yes Jessica Malloy MD   buPROPion (WELLBUTRIN SR) 150 MG extended release tablet Take 1 tablet by mouth daily 7/16/20  Yes Bella Briones MD   metFORMIN (GLUCOPHAGE) 1000 MG tablet Take 1 tablet by mouth 2 times daily (with meals) 7/16/20  Yes Bella Briones MD   triamcinolone (ARISTOCORT) 0.5 % cream Apply topically 3 times daily. 7/16/20  Yes Bella Briones MD   aspirin 81 MG tablet Take 81 mg by mouth daily   Yes Historical Provider, MD   docusate sodium (COLACE) 100 MG capsule TAKE ONE CAPSULE BY MOUTH TWICE DAILY 12/17/19  Yes Historical Provider, MD   mupirocin (BACTROBAN) 2 % ointment APPLY A SMALL AMOUNT TO EACH NOSTRIL TWICE DAILY STARTING 5 DAYS PRIOR TO SURGERY 12/4/19  Yes Historical Provider, MD   Acetaminophen (TYLENOL ARTHRITIS PAIN PO) Take 1 tablet by mouth as needed   Yes Historical Provider, MD   Multiple Vitamin (MULTI-VITAMIN DAILY) TABS Take by mouth   Yes Historical Provider, MD   Handicap Placard MISC by Does not apply route   Yes Historical Provider, MD   CPAP Machine MISC by Does not apply route   Yes Historical Provider, MD       Social History     Tobacco Use    Smoking status: Current Every Day Smoker     Packs/day: 0.37     Types: Cigarettes    Smokeless tobacco: Never Used   Substance Use Topics    Alcohol use: No       Review of Systems: All 12 systems reviewed and pertinent positives noted above. Lower Extremity Physical Examination:     Vitals:   Vitals:    04/19/21 1015   BP: 134/80   Pulse: 120   Resp: 22   Temp: 97.9 °F (36.6 °C)     General: AAO x 3 in NAD.      Vascular: DP and PT pulses palpable 2/4, bilateral.  CFT <3 seconds, bilateral.  Hair growth absent to the level of the digits, bilateral.  Edema present, bilateral.  Varicosities present, bilateral. Erythema present, bilateral. Distal Rubor absent bilateral.  Temperature decreased bilateral. Hyperpigmentation present bilateral. Atrophic skin no    Neurological: Sensation intact to light touch to level of digits, bilateral.  Protective sensation intact 10/10 sites via 5.07/10g [place]-Matilde Monofilament, bilateral.  negative Tinel's, bilateral.  negative Valleix sign, bilateral.  Vibratory intact bilateral.  Reflexes Decreased bilateral.  Paresthesias negative. Dysthesias negative. Sharp/dull intact bilateral.    Musculoskeletal: Muscle strength 5/5, bilateral.  Pain absent upon palpation bilateral. Normal medial longitudinal arch, bilateral.  Ankle ROM decreasd,bilateral.  1st MPJ ROM within normal limits, bilateral.  Dorsally contracted digits absent. No other foot deformities. Integument:   Open lesion present, Left. Seen anterior left leg multiple raw areas only with positive serous drainage. Definitely improved from last week. No undermining or probing no malodor. Periulcer edema and erythema is seen but improved from last week. Interdigital maceration absent to web spaces , Bilateral.  Nails{N b/l thickened, dystrophic and crumbly, discolored with subungual debris. Fissures absent, Bilateral. Hyperkeratotic tissue is absent. Pt has hyperkeratosis with hyperplasia and hyperpigmentation. Papillomatosis seen. Elephantiasis seen. Skin breakdown with lymphorrhea noted. Asessment: Patient is a 54 y.o. male with:   Hospital Problems           Last Modified POA    Uncontrolled type 2 DM with peripheral circulatory disorder ([address] 4/19/2021 Yes    Skin ulcer of left lower leg, limited to breakdown of skin ([address] 4/19/2021 Yes    Cellulitis of left lower extremity 4/19/2021 Yes        Ulcer Classification:  Venous insufficiency ulcer grade 1 D. IDSA  mild infection. Plan:   Patient examined and evaluated. Current condition and treatment options discussed in detail. Topical lidocaine applied to wound. Debridement none needed. .    Patient advised importance of compression therapy and elevation of the leg and stressed the importance of this in regards to wound healing. Compression: Patient had an unna boot and coban applied to the Left lower extremities. This was applied for control of leg edema. Patient educated on appropriate use and will keep the dressing dry and intact. Any increase in pain or if the dressing should fall down or slip, they should contact the clinic immediately. Also advised importance of continued elevation of the leg. Low level medical decision-making overall. Patient is stable chronic additional with acute uncomplicated condition. No further testing ordered. Overall low risk morbidity currently. Patient had the daily antibiotic stopped last Friday. Order will be wrote to go back to [facility] to have PICC line removed. We will continue with oral antibiotics  Orders Placed This Encounter   Medications    sulfamethoxazole-trimethoprim (BACTRIM DS) 800-160 MG per tablet     Sig: Take 1 tablet by mouth 2 times daily for 10 days     Dispense:  20 tablet     Refill:  0       Today's dressing:  max absorbent. For the lymphedema issue, pt should perform regular exercise, elevate his legs, and continue regular use of compression stockings long term. Contact clinic with any questions/problems/concerns or new signs of infection. Follow-up at [facility] in 1week(s).

## 2021-04-22 ENCOUNTER — TELEPHONE (OUTPATIENT)
Dept: FAMILY MEDICINE CLINIC | Age: 56
End: 2021-04-22

## 2021-04-22 NOTE — TELEPHONE ENCOUNTER
Patient's wife contacted the office stating that she needs a prescription for a glucose monitor and test strips. She stated that she was given an order from [facility] but when she took it to [facility], they stated it wasn't the right code. She would like a new prescription sent to [email or web address].

## 2021-04-23 RX ORDER — GLUCOSAMINE HCL/CHONDROITIN SU 500-400 MG
CAPSULE ORAL
Qty: 300 STRIP | Refills: 5 | Status: SHIPPED | OUTPATIENT
Start: 2021-04-23

## 2021-04-23 RX ORDER — LANCETS 30 GAUGE
1 EACH MISCELLANEOUS 2 TIMES DAILY
Qty: 300 EACH | Refills: 5 | Status: SHIPPED | OUTPATIENT
Start: 2021-04-23

## 2021-04-26 ENCOUNTER — HOSPITAL ENCOUNTER (OUTPATIENT)
Dept: WOUND CARE | Age: 56
Discharge: HOME | End: 2021-04-27
Payer: MEDICARE

## 2021-04-26 VITALS
TEMPERATURE: 98.4 F | OXYGEN SATURATION: 95 % | DIASTOLIC BLOOD PRESSURE: 78 MMHG | WEIGHT: 315 LBS | HEIGHT: 73 IN | SYSTOLIC BLOOD PRESSURE: 132 MMHG | BODY MASS INDEX: 41.75 KG/M2 | RESPIRATION RATE: 20 BRPM | HEART RATE: 101 BPM

## 2021-04-26 DIAGNOSIS — I89.0 LYMPHEDEMA OF BOTH LOWER EXTREMITIES: ICD-10-CM

## 2021-04-26 DIAGNOSIS — L97.921 SKIN ULCER OF LEFT LOWER LEG, LIMITED TO BREAKDOWN OF SKIN (HCC): ICD-10-CM

## 2021-04-26 PROCEDURE — 99214 OFFICE O/P EST MOD 30 MIN: CPT

## 2021-04-26 PROCEDURE — 29580 STRAPPING UNNA BOOT: CPT | Performed by: PODIATRIST

## 2021-04-26 PROCEDURE — 29580 STRAPPING UNNA BOOT: CPT

## 2021-04-26 PROCEDURE — 99213 OFFICE O/P EST LOW 20 MIN: CPT | Performed by: PODIATRIST

## 2021-04-26 RX ORDER — CLOBETASOL PROPIONATE 0.5 MG/G
OINTMENT TOPICAL ONCE
Status: CANCELLED | OUTPATIENT
Start: 2021-04-26 | End: 2021-04-26

## 2021-04-26 RX ORDER — LIDOCAINE HYDROCHLORIDE 40 MG/ML
SOLUTION TOPICAL ONCE
Status: CANCELLED | OUTPATIENT
Start: 2021-04-26 | End: 2021-04-26

## 2021-04-26 RX ORDER — LIDOCAINE HYDROCHLORIDE 20 MG/ML
JELLY TOPICAL ONCE
Status: CANCELLED | OUTPATIENT
Start: 2021-04-26 | End: 2021-04-26

## 2021-04-26 RX ORDER — BETAMETHASONE DIPROPIONATE 0.05 %
OINTMENT (GRAM) TOPICAL ONCE
Status: CANCELLED | OUTPATIENT
Start: 2021-04-26 | End: 2021-04-26

## 2021-04-26 RX ORDER — LIDOCAINE 40 MG/G
CREAM TOPICAL ONCE
Status: CANCELLED | OUTPATIENT
Start: 2021-04-26 | End: 2021-04-26

## 2021-04-26 RX ORDER — GINSENG 100 MG
CAPSULE ORAL ONCE
Status: CANCELLED | OUTPATIENT
Start: 2021-04-26 | End: 2021-04-26

## 2021-04-26 RX ORDER — LIDOCAINE 50 MG/G
OINTMENT TOPICAL ONCE
Status: CANCELLED | OUTPATIENT
Start: 2021-04-26 | End: 2021-04-26

## 2021-04-26 RX ORDER — BACITRACIN, NEOMYCIN, POLYMYXIN B 400; 3.5; 5 [USP'U]/G; MG/G; [USP'U]/G
OINTMENT TOPICAL ONCE
Status: CANCELLED | OUTPATIENT
Start: 2021-04-26 | End: 2021-04-26

## 2021-04-26 RX ORDER — BACITRACIN ZINC AND POLYMYXIN B SULFATE 500; 1000 [USP'U]/G; [USP'U]/G
OINTMENT TOPICAL ONCE
Status: CANCELLED | OUTPATIENT
Start: 2021-04-26 | End: 2021-04-26

## 2021-04-26 RX ORDER — GENTAMICIN SULFATE 1 MG/G
OINTMENT TOPICAL ONCE
Status: CANCELLED | OUTPATIENT
Start: 2021-04-26 | End: 2021-04-26

## 2021-04-26 NOTE — PROGRESS NOTES
Subjective: Brooks Gresham is a 54 y.o. male who presents with the ulceration of the L leg. Patient has been compliant with compression therapy. Patient tolerated the compression wrap well. . Patient relates pain is Absent . Pain is rated 0 out of 10 and is described as none. Currently denies F/C/N/V. No Known Allergies    Past Medical History:   Diagnosis Date    Hyperlipidemia     Hypertriglyceridemia     Obesity     Tobacco abuse        Prior to Admission medications    Medication Sig Start Date End Date Taking? Authorizing Provider   blood glucose monitor kit and supplies Dispense sufficient amount for indicated testing frequency. Dispense all needed supplies to include: monitor, strips, lancing device, lancets, control solutions, alcohol swabs. 4/23/21  Yes Brian Shelton MD   Lancets MISC 1 each by Does not apply route 2 times daily 4/23/21  Yes Brian Shelton MD   blood glucose monitor strips Test 2 times a day & as needed for symptoms of irregular blood glucose. Dispense sufficient amount for indicated testing frequency.  4/23/21  Yes Brian Shelton MD   sulfamethoxazole-trimethoprim (BACTRIM DS) 800-160 MG per tablet Take 1 tablet by mouth 2 times daily for 10 days 4/19/21 4/29/21 Yes Bryce Saleem DPM   SILVER SULFADIAZINE EX silver sulfADIAZINE Silver Sulfadiazine Active 1 APPLICATIO TP TWICE A DAY 25 March 30th, 2021 10:11am 03-  [facility] ([id number]) 3/30/21  Yes Historical Provider, MD   NYSTATIN 940565 UNIT/GM powder  3/30/21  Yes Historical Provider, MD   potassium chloride (KLOR-CON M) 20 MEQ TBCR extended release tablet  4/10/21  Yes Historical Provider, MD   lisinopril (PRINIVIL;ZESTRIL) 20 MG tablet  3/30/21  Yes Historical Provider, MD   furosemide (LASIX) 40 MG tablet TAKE 1 TABLET BY MOUTH TWICE DAILY 4/9/21  Yes Historical Provider, MD   SITagliptin (JANUVIA) 100 MG tablet Take 1 tablet by mouth daily 4/15/21  Yes Brian Shelton MD   cyclobenzaprine x 3 in NAD. Vascular: DP and PT pulses palpable 2/4, bilateral.  CFT <3 seconds, bilateral.  Hair growth absent to the level of the digits, bilateral.  Edema present, bilateral.  Varicosities present, bilateral. Erythema present, bilateral. Distal Rubor absent bilateral.  Temperature decreased bilateral. Hyperpigmentation present bilateral. Atrophic skin no    Neurological: Sensation intact to light touch to level of digits, bilateral.  Protective sensation intact 10/10 sites via 5.07/10g [place]-Matilde Monofilament, bilateral.  negative Tinel's, bilateral.  negative Valleix sign, bilateral.  Vibratory intact bilateral.  Reflexes Decreased bilateral.  Paresthesias negative. Dysthesias negative. Sharp/dull intact bilateral.    Musculoskeletal: Muscle strength 5/5, bilateral.  Pain absent upon palpation bilateral. Normal medial longitudinal arch, bilateral.  Ankle ROM decreasd,bilateral.  1st MPJ ROM within normal limits, bilateral.  Dorsally contracted digits absent. No other foot deformities. Integument:   Open lesion present, Left. Seen anterior left leg multiple raw areas only with positive serous drainage. again improved from last week. No undermining or probing no malodor. Periulcer edema and erythema is absent. Interdigital maceration absent to web spaces , Bilateral.  Nails{N b/l thickened, dystrophic and crumbly, discolored with subungual debris. Fissures absent, Bilateral. Hyperkeratotic tissue is absent. Pt has hyperkeratosis with hyperplasia and hyperpigmentation. Papillomatosis seen. Elephantiasis seen. Skin breakdown with lymphorrhea noted.        Asessment: Patient is a 54 y.o. male with:   Hospital Problems           Last Modified POA    Uncontrolled type 2 DM with peripheral circulatory disorder ([address] 4/26/2021 Yes    Lymphedema of leg 4/26/2021 Yes    Skin ulcer of left lower leg, limited to breakdown of skin ([address] 4/26/2021 Yes        Ulcer Classification:  Venous insufficiency ulcer grade 1 C. IDSA  no infection. Plan:   Patient examined and evaluated. Current condition and treatment options discussed in detail. Topical lidocaine applied to wound. Debridement none needed. .    Patient advised importance of compression therapy and elevation of the leg and stressed the importance of this in regards to wound healing. Compression: Patient had an unna boot and coban applied to the Left lower extremities. This was applied for control of leg edema. Patient educated on appropriate use and will keep the dressing dry and intact. Any increase in pain or if the dressing should fall down or slip, they should contact the clinic immediately. Also advised importance of continued elevation of the leg. Low level medical decision-making overall. Patient is stable chronic additional with acute uncomplicated condition. No further testing ordered. Overall low risk morbidity currently. Orders Placed This Encounter   Procedures    Ambulatory Referral to Lymphedema Clinic     Referral Priority:   Routine     Referral Type:   Consult for Advice and Opinion     Referral Reason:   Specialty Services Required     Requested Specialty:   Wound Care     Number of Visits Requested:   1       Today's dressing:  max absorbent. For the lymphedema issue, pt should perform regular exercise, elevate his legs, and continue regular use of compression stockings long term. Contact clinic with any questions/problems/concerns or new signs of infection. Follow-up at [facility] in 1week(s).

## 2021-05-03 ENCOUNTER — TELEPHONE (OUTPATIENT)
Dept: WOUND CARE | Age: 56
End: 2021-05-03

## 2021-05-03 ENCOUNTER — HOSPITAL ENCOUNTER (OUTPATIENT)
Dept: WOUND CARE | Age: 56
Discharge: HOME | End: 2021-05-04
Payer: MEDICARE

## 2021-05-03 VITALS
TEMPERATURE: 98.1 F | HEIGHT: 73 IN | RESPIRATION RATE: 18 BRPM | HEART RATE: 80 BPM | SYSTOLIC BLOOD PRESSURE: 138 MMHG | DIASTOLIC BLOOD PRESSURE: 78 MMHG | BODY MASS INDEX: 58.97 KG/M2

## 2021-05-03 DIAGNOSIS — L97.921 SKIN ULCER OF LEFT LOWER LEG, LIMITED TO BREAKDOWN OF SKIN (HCC): ICD-10-CM

## 2021-05-03 DIAGNOSIS — I89.0 LYMPHEDEMA OF BOTH LOWER EXTREMITIES: Primary | ICD-10-CM

## 2021-05-03 PROCEDURE — 29580 STRAPPING UNNA BOOT: CPT

## 2021-05-03 PROCEDURE — 29580 STRAPPING UNNA BOOT: CPT | Performed by: PODIATRIST

## 2021-05-03 PROCEDURE — 99214 OFFICE O/P EST MOD 30 MIN: CPT

## 2021-05-03 PROCEDURE — 99213 OFFICE O/P EST LOW 20 MIN: CPT | Performed by: PODIATRIST

## 2021-05-03 RX ORDER — LIDOCAINE 40 MG/G
CREAM TOPICAL ONCE
Status: CANCELLED | OUTPATIENT
Start: 2021-05-03 | End: 2021-05-03

## 2021-05-03 RX ORDER — LIDOCAINE HYDROCHLORIDE 20 MG/ML
JELLY TOPICAL ONCE
Status: CANCELLED | OUTPATIENT
Start: 2021-05-03 | End: 2021-05-03

## 2021-05-03 RX ORDER — LIDOCAINE 50 MG/G
OINTMENT TOPICAL ONCE
Status: CANCELLED | OUTPATIENT
Start: 2021-05-03 | End: 2021-05-03

## 2021-05-03 RX ORDER — LIDOCAINE HYDROCHLORIDE 40 MG/ML
SOLUTION TOPICAL ONCE
Status: CANCELLED | OUTPATIENT
Start: 2021-05-03 | End: 2021-05-03

## 2021-05-03 RX ORDER — BACITRACIN ZINC AND POLYMYXIN B SULFATE 500; 1000 [USP'U]/G; [USP'U]/G
OINTMENT TOPICAL ONCE
Status: CANCELLED | OUTPATIENT
Start: 2021-05-03 | End: 2021-05-03

## 2021-05-03 RX ORDER — GINSENG 100 MG
CAPSULE ORAL ONCE
Status: CANCELLED | OUTPATIENT
Start: 2021-05-03 | End: 2021-05-03

## 2021-05-03 RX ORDER — BACITRACIN, NEOMYCIN, POLYMYXIN B 400; 3.5; 5 [USP'U]/G; MG/G; [USP'U]/G
OINTMENT TOPICAL ONCE
Status: CANCELLED | OUTPATIENT
Start: 2021-05-03 | End: 2021-05-03

## 2021-05-03 RX ORDER — CLOBETASOL PROPIONATE 0.5 MG/G
OINTMENT TOPICAL ONCE
Status: CANCELLED | OUTPATIENT
Start: 2021-05-03 | End: 2021-05-03

## 2021-05-03 RX ORDER — BETAMETHASONE DIPROPIONATE 0.05 %
OINTMENT (GRAM) TOPICAL ONCE
Status: CANCELLED | OUTPATIENT
Start: 2021-05-03 | End: 2021-05-03

## 2021-05-03 RX ORDER — GENTAMICIN SULFATE 1 MG/G
OINTMENT TOPICAL ONCE
Status: CANCELLED | OUTPATIENT
Start: 2021-05-03 | End: 2021-05-03

## 2021-05-03 ASSESSMENT — PAIN SCALES - GENERAL: PAINLEVEL_OUTOF10: 0

## 2021-05-03 NOTE — PROGRESS NOTES
Subjective: Jalil Carcamo is a 54 y.o. male who presents with the ulceration of the L leg. Patient has been compliant with compression therapy. Patient tolerated the compression wrap well. Patient relates pain is Absent . Pain is rated 0 out of 10 and is described as none. Currently denies F/C/N/V. No Known Allergies    Past Medical History:   Diagnosis Date    Hyperlipidemia     Hypertriglyceridemia     Obesity     Tobacco abuse        Prior to Admission medications    Medication Sig Start Date End Date Taking? Authorizing Provider   blood glucose monitor kit and supplies Dispense sufficient amount for indicated testing frequency. Dispense all needed supplies to include: monitor, strips, lancing device, lancets, control solutions, alcohol swabs. 4/23/21  Yes Karan Almeida MD   Lancets MISC 1 each by Does not apply route 2 times daily 4/23/21  Yes Karan Almeida MD   blood glucose monitor strips Test 2 times a day & as needed for symptoms of irregular blood glucose. Dispense sufficient amount for indicated testing frequency.  4/23/21  Yes Karan Almeida MD   SILVER SULFADIAZINE EX silver sulfADIAZINE Silver Sulfadiazine Active 1 APPLICATIO TP TWICE A DAY 25 March 30th, 2021 10:11am 03-  [facility] ([id number]) 3/30/21  Yes Historical Provider, MD   NYSTATIN [id number] UNIT/GM powder  3/30/21  Yes Historical Provider, MD   potassium chloride (KLOR-CON M) 20 MEQ TBCR extended release tablet  4/10/21  Yes Historical Provider, MD   lisinopril (PRINIVIL;ZESTRIL) 20 MG tablet  3/30/21  Yes Historical Provider, MD   furosemide (LASIX) 40 MG tablet TAKE 1 TABLET BY MOUTH TWICE DAILY 4/9/21  Yes Historical Provider, MD   SITagliptin (JANUVIA) 100 MG tablet Take 1 tablet by mouth daily 4/15/21  Yes Karan Almeida MD   cyclobenzaprine (FLEXERIL) 10 MG tablet Take 1 tablet by mouth three times daily as needed for muscle spasm 3/23/21  Yes Karan Almeida MD   Multiple Minerals-Vitamins present, bilateral.  Varicosities present, bilateral. Erythema present, bilateral. Distal Rubor absent bilateral.  Temperature decreased bilateral. Hyperpigmentation present bilateral. Atrophic skin no    Neurological: Sensation intact to light touch to level of digits, bilateral.  Protective sensation intact 10/10 sites via 5.07/10g [place]-Matilde Monofilament, bilateral.  negative Tinel's, bilateral.  negative Valleix sign, bilateral.  Vibratory intact bilateral.  Reflexes Decreased bilateral.  Paresthesias negative. Dysthesias negative. Sharp/dull intact bilateral.    Musculoskeletal: Muscle strength 5/5, bilateral.  Pain absent upon palpation bilateral. Normal medial longitudinal arch, bilateral.  Ankle ROM decreasd,bilateral.  1st MPJ ROM within normal limits, bilateral.  Dorsally contracted digits absent. No other foot deformities. Integument:   Open lesion present, Left. Seen posterior medial left leg only with positive serous drainage. No undermining or probing no malodor. Periulcer edema and erythema is absent. Anterior resovled. New wound sub l arch, superficial and non infected. Interdigital maceration absent to web spaces , Bilateral.  Nails{N b/l thickened, dystrophic and crumbly, discolored with subungual debris. Fissures absent, Bilateral. Hyperkeratotic tissue is absent. Pt has hyperkeratosis with hyperplasia and hyperpigmentation. Papillomatosis seen. Elephantiasis seen. Skin breakdown with lymphorrhea noted. Asessment: Patient is a 54 y.o. male with:   Hospital Problems           Last Modified POA    Uncontrolled type 2 DM with peripheral circulatory disorder [address] [facility] 5/3/2021 Yes    Lymphedema of leg 5/3/2021 Yes    Skin ulcer of left lower leg, limited to breakdown of skin ([address] 5/3/2021 Yes        Ulcer Classification:  Venous insufficiency ulcer grade 1 C. IDSA  no infection. Plan:   Patient examined and evaluated.   Current condition and treatment options discussed in detail. Topical lidocaine applied to wound. Debridement none needed. .    Patient advised importance of compression therapy and elevation of the leg and stressed the importance of this in regards to wound healing. Compression: Patient had an unna boot and coban applied to the Left lower extremities. This was applied for control of leg edema. Patient educated on appropriate use and will keep the dressing dry and intact. Any increase in pain or if the dressing should fall down or slip, they should contact the clinic immediately. Also advised importance of continued elevation of the leg. Low level medical decision-making overall. Patient is stable chronic additional with acute uncomplicated condition. No further testing ordered. Overall low risk morbidity currently. Orders Placed This Encounter   Procedures    Ambulatory Referral to Lymphedema Clinic     Referral Priority:   Routine     Referral Type:   Consult for Advice and Opinion     Referral Reason:   Specialty Services Required     Requested Specialty:   Wound Care     Number of Visits Requested:   1       Today's dressing:  max absorbent, foam over foot wound  For the lymphedema issue, pt should perform regular exercise, elevate his legs, and continue regular use of compression stockings long term. Contact clinic with any questions/problems/concerns or new signs of infection. Follow-up at [facility] in 1 week(s).

## 2021-05-03 NOTE — PLAN OF CARE
Problem: Wound:  Goal: Will show signs of wound healing; wound closure and no evidence of infection  Description: Will show signs of wound healing; wound closure and no evidence of infection  Outcome: Ongoing     Problem: Smoking cessation:  Goal: Ability to formulate a plan to maintain a tobacco-free life will be supported  Description: Ability to formulate a plan to maintain a tobacco-free life will be supported  Outcome: Ongoing     Problem: Compression therapy:  Goal: Will be free from complications associated with compression therapy  Description: Will be free from complications associated with compression therapy  Outcome: Ongoing     Problem: Weight control:  Goal: Ability to maintain an optimal weight for height and age will be supported  Description: Ability to maintain an optimal weight for height and age will be supported  Outcome: Ongoing     Problem: Falls - Risk of:  Goal: Will remain free from falls  Description: Will remain free from falls  Outcome: Ongoing     Problem: Blood Glucose:  Goal: Ability to maintain appropriate glucose levels will improve  Description: Ability to maintain appropriate glucose levels will improve  Outcome: Ongoing

## 2021-05-03 NOTE — PROGRESS NOTES
Landis Duel Application   Below Knee    NAME:  Cornel Delgado  YOB: 1965  MEDICAL RECORD NUMBER:  [id number]  DATE:  5/3/2021    Bao Clifton boot: Applied moisturizing agent to dry skin as needed. Appied primary and secondary dressing as ordered. Applied Unna roll from toes to knee overlapping each time. Applied ace wrap or coban from toes to below the knee. Instructed patient/caregiver to keep dressing dry and intact. DO NOT REMOVE DRESSING. Instructed pt/family/caregiver to report excessive draining, loose bandage, wet dressing, severe pain or tingling in toes. Applied Landis Duel dressing below the knee to left lower leg. Unna Boot(s) were applied per  Guidelines.      Electronically signed by Diana Montalvo RN on 5/3/2021 at 11:18 AM

## 2021-05-10 ENCOUNTER — HOSPITAL ENCOUNTER (OUTPATIENT)
Dept: WOUND CARE | Age: 56
Discharge: HOME | End: 2021-05-11
Payer: MEDICARE

## 2021-05-10 VITALS
BODY MASS INDEX: 41.75 KG/M2 | SYSTOLIC BLOOD PRESSURE: 134 MMHG | TEMPERATURE: 98.2 F | HEIGHT: 73 IN | DIASTOLIC BLOOD PRESSURE: 88 MMHG | WEIGHT: 315 LBS | RESPIRATION RATE: 20 BRPM | HEART RATE: 98 BPM

## 2021-05-10 DIAGNOSIS — L97.921 SKIN ULCER OF LEFT LOWER LEG, LIMITED TO BREAKDOWN OF SKIN (HCC): ICD-10-CM

## 2021-05-10 PROCEDURE — 99214 OFFICE O/P EST MOD 30 MIN: CPT

## 2021-05-10 PROCEDURE — 99213 OFFICE O/P EST LOW 20 MIN: CPT | Performed by: PODIATRIST

## 2021-05-10 NOTE — PROGRESS NOTES
(CALCIUM-MAGNESIUM-ZINC-D3) TABS Take by mouth   Yes Historical Provider, MD   meloxicam (MOBIC) 15 MG tablet Take 1 tablet by mouth daily as needed for Pain 7/16/20  Yes Karan Almeida MD   atorvastatin (LIPITOR) 40 MG tablet Take 1 tablet by mouth daily 7/16/20  Yes Karan Almeida MD   bu[facility]ion [facility]) 150 MG extended release tablet Take 1 tablet by mouth daily 7/16/20  Yes Karan Almeida MD   metFORMIN (GLUCOPHAGE) 1000 MG tablet Take 1 tablet by mouth 2 times daily (with meals) 7/16/20  Yes Karan Almeida MD   triamcinolone (ARISTOCORT) 0.5 % cream Apply topically 3 times daily. 7/16/20  Yes Karan Almeida MD   aspirin 81 MG tablet Take 81 mg by mouth daily   Yes Historical Provider, MD   docusate sodium (COLACE) 100 MG capsule TAKE ONE CAPSULE BY MOUTH TWICE DAILY 12/17/19  Yes Historical Provider, MD   mupirocin (BACTROBAN) 2 % ointment APPLY A SMALL AMOUNT TO EACH NOSTRIL TWICE DAILY STARTING 5 DAYS PRIOR TO SURGERY 12/4/19  Yes Historical Provider, MD   Acetaminophen (TYLENOL ARTHRITIS PAIN PO) Take 1 tablet by mouth as needed   Yes Historical Provider, MD   Multiple Vitamin (MULTI-VITAMIN DAILY) TABS Take by mouth   Yes Historical Provider, MD   Handicap Placard MISC by Does not apply route   Yes Historical Provider, MD   CPAP Machine MISC by Does not apply route   Yes Historical Provider, MD       Social History     Tobacco Use    Smoking status: Current Every Day Smoker     Packs/day: 0.37     Types: Cigarettes    Smokeless tobacco: Never Used   Substance Use Topics    Alcohol use: No       Review of Systems: All 12 systems reviewed and pertinent positives noted above. Lower Extremity Physical Examination:     Vitals:   Vitals:    05/10/21 1132   BP: 134/88   Pulse: 98   Resp: 20   Temp: 98.2 °F (36.8 °C)     General: AAO x 3 in NAD.      Vascular: DP and PT pulses palpable 2/4, bilateral.  CFT <3 seconds, bilateral.  Hair growth absent to the level of the digits, bilateral.  Edema present, bilateral.  Varicosities present, bilateral. Erythema present, bilateral. Distal Rubor absent bilateral.  Temperature decreased bilateral. Hyperpigmentation present bilateral. Atrophic skin no    Neurological: Sensation intact to light touch to level of digits, bilateral.  Protective sensation intact 10/10 sites via 5.07/10g [place]-Matilde Monofilament, bilateral.  negative Tinel's, bilateral.  negative Valleix sign, bilateral.  Vibratory intact bilateral.  Reflexes Decreased bilateral.  Paresthesias negative. Dysthesias negative. Sharp/dull intact bilateral.    Musculoskeletal: Muscle strength 5/5, bilateral.  Pain absent upon palpation bilateral. Normal medial longitudinal arch, bilateral.  Ankle ROM decreasd,bilateral.  1st MPJ ROM within normal limits, bilateral.  Dorsally contracted digits absent. No other foot deformities. Integument:   Open lesion absent, Left. Seen posterior medial left leg resovled. L arch resolved also. Interdigital maceration absent to web spaces , Bilateral.  Nails{N b/l thickened, dystrophic and crumbly, discolored with subungual debris. Fissures absent, Bilateral. Hyperkeratotic tissue is absent. Pt has hyperkeratosis with hyperplasia and hyperpigmentation. Papillomatosis seen. Elephantiasis seen. Skin breakdown with lymphorrhea noted. Asessment: Patient is a 54 y.o. male with:   Hospital Problems           Last Modified POA    Uncontrolled type 2 DM with peripheral circulatory disorder ([address] 5/10/2021 Yes    Lymphedema of leg 5/10/2021 Yes    Skin ulcer of left lower leg, limited to breakdown of skin ([address] 5/10/2021 Yes        Ulcer Classification:  Venous insufficiency ulcer grade 1 C resolved. IDSA  no infection. Plan:   Patient examined and evaluated. Current condition and treatment options discussed in detail. No debridement needed of the ulcer today. The ulcer is completley resolved at this time.  Pt was educated about means of prevention and risk factor modification. Pt should return to the clinic PRN if any further ulceration should occur. Low level medical decision-making overall. Patient is stable chronic additional with acute uncomplicated condition. No further testing ordered. Overall low risk morbidity currently. Patient set to start lymphedema therapy tomorrow at [facility] [place]. For the lymphedema issue, pt should perform regular exercise, elevate his legs, and continue regular use of compression stockings long term. Contact clinic with any questions/problems/concerns or new signs of infection.  Follow-up at [facility] in as needed

## 2021-05-11 ENCOUNTER — TELEPHONE (OUTPATIENT)
Dept: FAMILY MEDICINE CLINIC | Age: 56
End: 2021-05-11

## 2021-05-11 DIAGNOSIS — J42 CHRONIC BRONCHITIS, UNSPECIFIED CHRONIC BRONCHITIS TYPE (HCC): ICD-10-CM

## 2021-05-11 DIAGNOSIS — E11.8 TYPE 2 DIABETES MELLITUS WITH COMPLICATION, WITHOUT LONG-TERM CURRENT USE OF INSULIN (HCC): ICD-10-CM

## 2021-05-11 DIAGNOSIS — I87.2 VENOUS INSUFFICIENCY (CHRONIC) (PERIPHERAL): ICD-10-CM

## 2021-05-11 DIAGNOSIS — M26.629 TEMPOROMANDIBULAR JOINT-PAIN-DYSFUNCTION SYNDROME (TMJ): ICD-10-CM

## 2021-05-11 DIAGNOSIS — G47.33 OSA ON CPAP: ICD-10-CM

## 2021-05-11 DIAGNOSIS — E66.01 MORBID OBESITY (HCC): ICD-10-CM

## 2021-05-11 DIAGNOSIS — M16.11 PRIMARY OSTEOARTHRITIS OF RIGHT HIP: ICD-10-CM

## 2021-05-11 DIAGNOSIS — F17.200 SMOKING: ICD-10-CM

## 2021-05-11 DIAGNOSIS — M19.90 ARTHRITIS: ICD-10-CM

## 2021-05-11 DIAGNOSIS — Z99.89 OSA ON CPAP: ICD-10-CM

## 2021-05-11 DIAGNOSIS — I10 ESSENTIAL HYPERTENSION: ICD-10-CM

## 2021-05-11 DIAGNOSIS — I89.0 LYMPHEDEMA OF BOTH LOWER EXTREMITIES: ICD-10-CM

## 2021-05-11 DIAGNOSIS — R21 RASH OF BACK: ICD-10-CM

## 2021-05-11 DIAGNOSIS — M62.830 BACK MUSCLE SPASM: ICD-10-CM

## 2021-05-11 DIAGNOSIS — E78.2 MIXED HYPERLIPIDEMIA: ICD-10-CM

## 2021-05-11 NOTE — TELEPHONE ENCOUNTER
[facility] CLINICAL PHARMACY REVIEW: ADHERENCE REVIEW  Identified care gap per [employer]; fills at [facility]: ACE/ARB, Diabetes and Statin adherence    Last Visit: 4/15/21    Patient also appears to be prescribed: Atorvastatin 40mg,  Lisinopril 20mg, Januvia 100mg, Metformin 500mg    Patient not found in Outcomes MTM    ASSESSMENT  ACE/ARB ADHERENCE    Per Insurance Records through [employer] (2020 [facility] = 0%; YTD [place] = 26%; Potential Fail Date: 5/28/21):   Lisinopril last filled on 3/30/21 for 5 day supply. Next refill due: 4/4/21    Per Reconciled Dispense Report:  Lisinopril last filled on 3/30/21 for 5 day supply. Per [facility]:   Lisinopril last picked up on 3/30/21 for 5 day supply. 0 refills remaining. Billed through [employer]     BP Readings from Last 3 Encounters:   05/10/21 134/88   05/03/21 138/78   04/26/21 132/78     Estimated Creatinine Clearance: 167 mL/min (based on SCr of 0.9 mg/dL). DIABETES ADHERENCE    Per Insurance Records through n/a    Per Reconciled Dispense Report:  metformin last filled on 3/30/21 for 30 day supply. Per [facility]:   metformin last picked up on 3/30/21 for 30 day supply. 0 refills remaining. Billed through [address]     Per Reconciled Dispense Report:  Januvia last filled on 4/15/21 for 30 day supply. Per [facility]:   Annita Arias was filled and returned to stock on 4/15/21. Per pharmacy, it was a high cpay of $141    Lab Results   Component Value Date    LABA1C 6.6 10/15/2020    LABA1C 7.5 07/16/2020    LABA1C 6.0 01/28/2020     NOTE A1c not yet completed this calendar year    [facility]    Per Insurance Records through n/a    Per Reconciled Dispense Report:  Atorvastatin last filled on 2/25/21 for 90 day supply. Per [facility]:   Atorvastatin last picked up on 2/25/21 for 90 day supply. 0 refills remaining. Billed through [address]   Per pharmacy, atorvastatin was placed on hold on 4/15/21.  Copay for a 90ds is $38    Lab Results   Component Value Date    CHOL 128 08/07/2020    TRIG 124 08/07/2020    HDL 36 (L) 08/07/2020    LDLCHOLESTEROL 67 08/07/2020    LDLCALC 65.6 07/29/2019     ALT   Date Value Ref Range Status   08/07/2020 30 5 - 41 U/L Final     AST   Date Value Ref Range Status   08/07/2020 26 <40 U/L Final     The ASCVD Risk score (Adenike Bloom, et al., 2013) failed to calculate for the following reasons:     The valid total cholesterol range is 130 to 320 mg/dL     PLAN  The following are interventions that have been identified:   - Patient eligible for 90 day supply of Lisinopril, metformin and Januvia     Attempting to reach patient to review changing prescription from a 30-day supply to a 90-day supply.  Left message asking for return call     Future Appointments   Date Time Provider Elisa Valencia   5/12/2021  1:45 PM CARLOS Galo [facility]   5/14/2021  9:00 AM [facility]

## 2021-05-12 ENCOUNTER — TELEPHONE (OUTPATIENT)
Dept: FAMILY MEDICINE CLINIC | Age: 56
End: 2021-05-12

## 2021-05-12 ENCOUNTER — OFFICE VISIT (OUTPATIENT)
Dept: PODIATRY | Age: 56
End: 2021-05-12
Payer: MEDICARE

## 2021-05-12 VITALS
WEIGHT: 315 LBS | HEIGHT: 73 IN | SYSTOLIC BLOOD PRESSURE: 132 MMHG | BODY MASS INDEX: 41.75 KG/M2 | HEART RATE: 84 BPM | DIASTOLIC BLOOD PRESSURE: 80 MMHG

## 2021-05-12 DIAGNOSIS — E11.51 CONTROLLED TYPE 2 DM WITH PERIPHERAL CIRCULATORY DISORDER (HCC): Primary | ICD-10-CM

## 2021-05-12 DIAGNOSIS — I89.0 LYMPHEDEMA: ICD-10-CM

## 2021-05-12 DIAGNOSIS — B35.1 DERMATOPHYTOSIS OF NAIL: ICD-10-CM

## 2021-05-12 PROCEDURE — 11720 DEBRIDE NAIL 1-5: CPT | Performed by: PODIATRIST

## 2021-05-12 PROCEDURE — 99999 PR OFFICE/OUTPT VISIT,PROCEDURE ONLY: CPT | Performed by: PODIATRIST

## 2021-05-12 NOTE — PROGRESS NOTES
Foot Care Worksheet  PCP: Javier Moore MD  Last visit: 1/19/21    Nail description:  Thick , Yellow , Crumbly , Marked limitation of ambulation     Pain resulting from thickened and dystrophy of nail plate No    Nails involved  Right   1 5 (T5-T9)  Left     5  (TA-T4)    Q7 1 Class A Finding - Non traumatic amputation of foot No    Q8 2 Class B Findings - Absent DP pulse No, Absent PT pulse No, Advanced tropic changes (3 required) No    Decrease hair growth Yes, Nail changes/thickening Yes, Pigmented changes/discoloration Yes, Skin texture (thin, shiny) Yes, Skin color (rubor/redness) No    Q9 1 Class B and 2 Class C Findings  Claudication No, Temperature change Yes, Paresthesia No, Burning No, Edema Yes

## 2021-05-12 NOTE — PROGRESS NOTES
Subjective:  Patient presents to [facility] today for routine diabetic foot care. Patient denies any new problems with their feet. Patient's diabetic control has been not changed. No Known Allergies    Past Medical History:   Diagnosis Date    Hyperlipidemia     Hypertriglyceridemia     Obesity     Tobacco abuse        Prior to Admission medications    Medication Sig Start Date End Date Taking? Authorizing Provider   blood glucose monitor kit and supplies Dispense sufficient amount for indicated testing frequency. Dispense all needed supplies to include: monitor, strips, lancing device, lancets, control solutions, alcohol swabs. 4/23/21  Yes Nancy Montes MD   Lancets MISC 1 each by Does not apply route 2 times daily 4/23/21  Yes Nancy Montes MD   blood glucose monitor strips Test 2 times a day & as needed for symptoms of irregular blood glucose. Dispense sufficient amount for indicated testing frequency.  4/23/21  Yes Nancy Montes MD   SILVER SULFADIAZINE EX silver sulfADIAZINE Silver Sulfadiazine Active 1 APPLICATIO TP TWICE A DAY 25 March 30th, 2021 10:11am 03-  [facility] ([id number]) 3/30/21  Yes Historical Provider, MD   NYSTATIN 877840 UNIT/GM powder  3/30/21  Yes Historical Provider, MD   potassium chloride (KLOR-CON M) 20 MEQ TBCR extended release tablet  4/10/21  Yes Historical Provider, MD   lisinopril (PRINIVIL;ZESTRIL) 20 MG tablet  3/30/21  Yes Historical Provider, MD   furosemide (LASIX) 40 MG tablet TAKE 1 TABLET BY MOUTH TWICE DAILY 4/9/21  Yes Historical Provider, MD   SITagliptin (JANUVIA) 100 MG tablet Take 1 tablet by mouth daily 4/15/21  Yes Nancy Montes MD   cyclobenzaprine (FLEXERIL) 10 MG tablet Take 1 tablet by mouth three times daily as needed for muscle spasm 3/23/21  Yes Nancy Montes MD   Multiple Minerals-Vitamins (CALCIUM-MAGNESIUM-ZINC-D3) TABS Take by mouth   Yes Historical Provider, MD   meloxicam (MOBIC) 15 MG tablet Take 1 tablet by Protective sensation intact 10/10 sites via 5.07/10g [place]-Matilde Monofilament, bilateral.  negative Tinel's, bilateral.  negative Valleix sign, bilateral.  Vibratory intact bilateral.  Reflexes Decreased bilateral.  Paresthesias negative. Dysthesias negative. Sharp/dull intact bilateral.    Integument:  Open lesion absent, Bilateral.  Interdigital maceration absent to web spaces,absent Bilateral.  Nails left 5 and right 1, 5 thickened, dystrophic and crumbly, discolored with subungual debris. Fissures absent, Bilateral. Hyperkeratotic tissue is absent. Assessment:    Diagnosis Orders   1. Controlled type 2 DM with peripheral circulatory disorder (HCC)     2. Lymphedema     3. Dermatophytosis of nail         Plan:  Diabetic foot education and exam.  Nails as mentioned above debrided in length and thickness. Patient advised about OTC treatments for nails and callous. Patient will follow up in 10 weeks for routine foot care or PRN if any further problems arise.

## 2021-05-14 ENCOUNTER — OFFICE VISIT (OUTPATIENT)
Dept: FAMILY MEDICINE CLINIC | Age: 56
End: 2021-05-14
Payer: MEDICARE

## 2021-05-14 VITALS — DIASTOLIC BLOOD PRESSURE: 70 MMHG | OXYGEN SATURATION: 99 % | HEART RATE: 90 BPM | SYSTOLIC BLOOD PRESSURE: 136 MMHG

## 2021-05-14 DIAGNOSIS — I89.0 LYMPHEDEMA OF BOTH LOWER EXTREMITIES: ICD-10-CM

## 2021-05-14 DIAGNOSIS — I87.2 VENOUS INSUFFICIENCY (CHRONIC) (PERIPHERAL): Primary | ICD-10-CM

## 2021-05-14 DIAGNOSIS — L97.921 SKIN ULCER OF LEFT LOWER LEG, LIMITED TO BREAKDOWN OF SKIN (HCC): ICD-10-CM

## 2021-05-14 DIAGNOSIS — J42 CHRONIC BRONCHITIS, UNSPECIFIED CHRONIC BRONCHITIS TYPE (HCC): ICD-10-CM

## 2021-05-14 DIAGNOSIS — K21.9 GASTROESOPHAGEAL REFLUX DISEASE WITHOUT ESOPHAGITIS: ICD-10-CM

## 2021-05-14 DIAGNOSIS — R11.10 DRY HEAVES: ICD-10-CM

## 2021-05-14 DIAGNOSIS — Z87.11 HISTORY OF PEPTIC ULCER DISEASE: ICD-10-CM

## 2021-05-14 DIAGNOSIS — F17.200 SMOKER: ICD-10-CM

## 2021-05-14 DIAGNOSIS — E66.01 MORBID OBESITY (HCC): ICD-10-CM

## 2021-05-14 PROCEDURE — 99213 OFFICE O/P EST LOW 20 MIN: CPT

## 2021-05-14 PROCEDURE — 99214 OFFICE O/P EST MOD 30 MIN: CPT | Performed by: INTERNAL MEDICINE

## 2021-05-14 RX ORDER — FAMOTIDINE 20 MG/1
20 TABLET, FILM COATED ORAL 2 TIMES DAILY
Qty: 60 TABLET | Refills: 3 | Status: SHIPPED | OUTPATIENT
Start: 2021-05-14 | End: 2021-09-27

## 2021-05-14 RX ORDER — OMEPRAZOLE 40 MG/1
40 CAPSULE, DELAYED RELEASE ORAL
Qty: 90 CAPSULE | Refills: 1 | Status: SHIPPED | OUTPATIENT
Start: 2021-05-14 | End: 2021-11-19

## 2021-05-14 RX ORDER — ATORVASTATIN CALCIUM 40 MG/1
40 TABLET, FILM COATED ORAL DAILY
Qty: 90 TABLET | Refills: 3 | Status: SHIPPED | OUTPATIENT
Start: 2021-05-14 | End: 2022-07-14 | Stop reason: SDUPTHER

## 2021-05-14 RX ORDER — LISINOPRIL 20 MG/1
20 TABLET ORAL DAILY
Qty: 90 TABLET | Refills: 2 | Status: SHIPPED | OUTPATIENT
Start: 2021-05-14 | End: 2022-07-14 | Stop reason: SDUPTHER

## 2021-05-14 NOTE — TELEPHONE ENCOUNTER
Emelina Katz MD    Patient was switched from lisinopril/HCTZ to lisinopril recently (by [facility]?) and needs a refill on file at the pharmacy. Additionally, the patient is out of refills for atorvastatin and metformin. I have pended refill requests for your convenience. Seen today.      Thank you,   Geovani Pettit, PharmD, [address]  Direct: [phone]  Department, toll free [phone], option 7

## 2021-05-14 NOTE — PROGRESS NOTES
[address]  [address]  Dept: [phone]  Dept Fax: [phone]: [phone]     Visit Date:  5/14/2021    Patient:  Lian Chacon  YOB: 1965    HPI:   Lian Chacon presents today for   Chief Complaint   Patient presents with    Gastroesophageal Reflux     patient is getting heart burn on and off during the day.  Discuss Medications     they were unable to  the januvia due to the price of the medication. Matthew Payan HPI 57-year-old male with a history of morbid obesity with a BMI of 61, TEOFILO on CPAP, pulmonary hypertension, chronic bronchitis with emphysema, smoking history currently half a pack a day, chronic venous insufficiency/peripheral edema/stasis ulcers/diabetic ulcers/lymphedema, hypertension, diabetes mellitus type 2, dyslipidemia, CAD, status post couple of right hip replacement surgeries and multiple dislocations in the past is coming in for DM check up. DM type 2-A1C>9. Brought in home blood sugars logs and have been ranging around 150-180s fasting and does not check after or before a meal. Only one big number I saw was close to 232. No hypoglycemia related events. Morbid Obesity-BMI 57 has lost more than 10 lbs within 1 vinnie by cutting back on sodas. Feels so much better and is motivated to loose more. Feels with weight loss he is much more functional. Able to lie flat on the bed and does not need to sleep on recliner anymore. TEOFILO- On Bipap. Has been compliant. Lower extremity swelling/lymphedema/history of venous stasis ulcers-Skin check today shows flaky skin, and couple of spots on the posterior calf area bilaterally -sloughing off the epidermal layer with no signs of infection. Dry heaves/GERD like symptoms- Reported having worsening GERD like symptoms associated with dry heaves.          Medications  Prior to Visit Medications    Medication Sig Taking? Authorizing Provider   blood glucose monitor kit and supplies Dispense sufficient amount for indicated testing frequency. Dispense all needed supplies to include: monitor, strips, lancing device, lancets, control solutions, alcohol swabs. Jeniffer Nelson MD   Lancets MISC 1 each by Does not apply route 2 times daily  Jeniffer Nelson MD   blood glucose monitor strips Test 2 times a day & as needed for symptoms of irregular blood glucose. Dispense sufficient amount for indicated testing frequency. Jeniffer Nelson MD   SILVER SULFADIAZINE EX silver sulfADIAZINE Silver Sulfadiazine Active 1 APPLICATIO TP TWICE A DAY 25 March 30th, 2021 10:11am 03-  [facility] ([id number])  Historical Provider, MD   NYSTATIN [id number] UNIT/GM powder   Historical Provider, MD   potassium chloride (KLOR-CON M) 20 MEQ TBCR extended release tablet   Historical Provider, MD   lisinopril (PRINIVIL;ZESTRIL) 20 MG tablet   Historical Provider, MD   furosemide (LASIX) 40 MG tablet TAKE 1 TABLET BY MOUTH TWICE DAILY  Historical Provider, MD   SITagliptin (JANUVIA) 100 MG tablet Take 1 tablet by mouth daily  Jeniffer Nelson MD   cyclobenzaprine (FLEXERIL) 10 MG tablet Take 1 tablet by mouth three times daily as needed for muscle spasm  Jeniffer Nelson MD   Multiple Minerals-Vitamins (CALCIUM-MAGNESIUM-ZINC-D3) TABS Take by mouth  Historical Provider, MD   meloxicam (MOBIC) 15 MG tablet Take 1 tablet by mouth daily as needed for Pain  Jeniffer Nelson MD   atorvastatin (LIPITOR) 40 MG tablet Take 1 tablet by mouth daily  eJniffer Nelson MD   buPROPion (WELLBUTRIN SR) 150 MG extended release tablet Take 1 tablet by mouth daily  Jeniffer Nelson MD   metFORMIN (GLUCOPHAGE) 1000 MG tablet Take 1 tablet by mouth 2 times daily (with meals)  Jeniffer Nelson MD   triamcinolone (ARISTOCORT) 0.5 % cream Apply topically 3 times daily.   Jeniffer Nelson MD   aspirin 81 MG tablet Take 81 mg by mouth daily  Potassium monitoring  04/15/2022    Creatinine monitoring  04/15/2022    Diabetic foot exam  05/12/2022    Hepatitis C screen  Completed    Hepatitis A vaccine  Aged Out    Hib vaccine  Aged Out    Meningococcal (ACWY) vaccine  Aged Out       Subjective:      Review of Systems   Constitutional: Negative for fatigue, fever and unexpected weight change. HENT: Negative for ear pain, postnasal drip, rhinorrhea, sinus pain, sore throat and trouble swallowing. Eyes: Negative for visual disturbance. Respiratory: Negative for cough, chest tightness and shortness of breath. Cardiovascular: Negative for chest pain and leg swelling. Gastrointestinal: Negative for abdominal pain, blood in stool and diarrhea. Endocrine: Negative for polyuria. Genitourinary: Negative for difficulty urinating and flank pain. Musculoskeletal: Negative for arthralgias, joint swelling and myalgias. Skin: Negative for rash. Allergic/Immunologic: Negative for environmental allergies. Neurological: Negative for weakness, light-headedness, numbness and headaches. Hematological: Negative for adenopathy. Psychiatric/Behavioral: Negative for behavioral problems and suicidal ideas. The patient is not nervous/anxious. Objective:     /70 (Site: Right Upper Arm, Position: Sitting)   Pulse 90   SpO2 99%     Physical Exam  Vitals and nursing note reviewed. Constitutional:       Appearance: He is obese. HENT:      Head: Normocephalic and atraumatic. Cardiovascular:      Rate and Rhythm: Normal rate and regular rhythm. Comments: Distant heart sounds  Pulmonary:      Effort: Pulmonary effort is normal.      Breath sounds: No stridor. Comments: Decreased breath sounds at the bases. Abdominal:      Palpations: Abdomen is soft. Musculoskeletal:      Right lower leg: Edema present. Left lower leg: Edema present.       Comments: Skin flakes/xerosis of the skin  Stasis ulcers with epidermal layer sloughing off in the posterior calf area bilaterally. Skin:     General: Skin is warm. Neurological:      Mental Status: He is oriented to person, place, and time. Mental status is at baseline. Psychiatric:         Mood and Affect: Mood normal.             Assessment       Diagnosis Orders   1. Venous insufficiency (chronic) (peripheral)  omeprazole (PRILOSEC) 40 MG delayed release capsule    famotidine (PEPCID) 20 MG tablet   2. Skin ulcer of left lower leg, limited to breakdown of skin (HCC)  omeprazole (PRILOSEC) 40 MG delayed release capsule    famotidine (PEPCID) 20 MG tablet   3. Chronic bronchitis, unspecified chronic bronchitis type (HCC)  omeprazole (PRILOSEC) 40 MG delayed release capsule    famotidine (PEPCID) 20 MG tablet   4. Uncontrolled type 2 DM with peripheral circulatory disorder (HCC)  omeprazole (PRILOSEC) 40 MG delayed release capsule    famotidine (PEPCID) 20 MG tablet   5. Gastroesophageal reflux disease without esophagitis  omeprazole (PRILOSEC) 40 MG delayed release capsule    famotidine (PEPCID) 20 MG tablet   6. History of peptic ulcer disease  omeprazole (PRILOSEC) 40 MG delayed release capsule    famotidine (PEPCID) 20 MG tablet   7. Dry heaves  omeprazole (PRILOSEC) 40 MG delayed release capsule    famotidine (PEPCID) 20 MG tablet   8. Lymphedema of both lower extremities  omeprazole (PRILOSEC) 40 MG delayed release capsule    famotidine (PEPCID) 20 MG tablet   9. Morbid obesity (HCC)  omeprazole (PRILOSEC) 40 MG delayed release capsule    famotidine (PEPCID) 20 MG tablet   10. Smoker  omeprazole (PRILOSEC) 40 MG delayed release capsule    famotidine (PEPCID) 20 MG tablet         PLAN   Skin care/lymphedema clinic/leg elevation  I think he is doing in terms of diabetes for now so we shall not make any changes. Reluctant to start any new injectable or oral form of diabetic medication. Januvia not covered. Cut back on smoking. Follow up in 1 month.    No orders of the defined types were placed in this encounter. No follow-ups on file. Patient given educational materials - see patient instructions. Discussed use, benefit, and side effects of prescribed medications. All patient questions answered. Pt voiced understanding. Reviewed health maintenance.        Electronically signed Wyatt Crawford MD on 5/14/2021 at 9:12 AM EDT

## 2021-05-18 ASSESSMENT — ENCOUNTER SYMPTOMS
BLOOD IN STOOL: 0
CHEST TIGHTNESS: 0
RHINORRHEA: 0
TROUBLE SWALLOWING: 0
SINUS PAIN: 0
ABDOMINAL PAIN: 0
SHORTNESS OF BREATH: 0
COUGH: 0
SORE THROAT: 0
DIARRHEA: 0

## 2021-05-19 NOTE — TELEPHONE ENCOUNTER
For Pharmacy [address] in place:  No   Recommendation Provided To: Patient/Caregiver: 1 via Telephone   Intervention Detail: Adherence Monitorin   Gap Closed?: Yes    Total # of Interventions Recommended: 1   Total # of Interventions Accepted: 1   Intervention Accepted By: Patient/Caregiver: 1   Time Spent (min): 15

## 2021-05-26 DIAGNOSIS — Z99.89 OSA ON CPAP: ICD-10-CM

## 2021-05-26 DIAGNOSIS — E66.01 MORBID OBESITY (HCC): ICD-10-CM

## 2021-05-26 DIAGNOSIS — M19.90 ARTHRITIS: ICD-10-CM

## 2021-05-26 DIAGNOSIS — I10 ESSENTIAL HYPERTENSION: ICD-10-CM

## 2021-05-26 DIAGNOSIS — R21 RASH OF BACK: ICD-10-CM

## 2021-05-26 DIAGNOSIS — I87.2 VENOUS INSUFFICIENCY (CHRONIC) (PERIPHERAL): ICD-10-CM

## 2021-05-26 DIAGNOSIS — M26.629 TEMPOROMANDIBULAR JOINT-PAIN-DYSFUNCTION SYNDROME (TMJ): ICD-10-CM

## 2021-05-26 DIAGNOSIS — E78.2 MIXED HYPERLIPIDEMIA: ICD-10-CM

## 2021-05-26 DIAGNOSIS — J42 CHRONIC BRONCHITIS, UNSPECIFIED CHRONIC BRONCHITIS TYPE (HCC): ICD-10-CM

## 2021-05-26 DIAGNOSIS — M62.830 BACK MUSCLE SPASM: ICD-10-CM

## 2021-05-26 DIAGNOSIS — I89.0 LYMPHEDEMA OF BOTH LOWER EXTREMITIES: ICD-10-CM

## 2021-05-26 DIAGNOSIS — E11.8 TYPE 2 DIABETES MELLITUS WITH COMPLICATION, WITHOUT LONG-TERM CURRENT USE OF INSULIN (HCC): ICD-10-CM

## 2021-05-26 DIAGNOSIS — G47.33 OSA ON CPAP: ICD-10-CM

## 2021-05-26 DIAGNOSIS — M16.11 PRIMARY OSTEOARTHRITIS OF RIGHT HIP: ICD-10-CM

## 2021-05-26 DIAGNOSIS — F17.200 SMOKING: ICD-10-CM

## 2021-05-26 RX ORDER — CYCLOBENZAPRINE HCL 10 MG
TABLET ORAL
Qty: 90 TABLET | Refills: 0 | Status: SHIPPED | OUTPATIENT
Start: 2021-05-26 | End: 2021-07-19

## 2021-05-26 NOTE — TELEPHONE ENCOUNTER
Cornel Delgado is requesting a refill on the following medication(s):  Requested Prescriptions     Pending Prescriptions Disp Refills    cyclobenzaprine (FLEXERIL) 10 MG tablet [Pharmacy Med Name: Cyclobenzaprine HCl 10 MG Oral Tablet] 90 tablet 0     Sig: Take 1 tablet by mouth three times daily as needed for muscle spasm       Last Visit Date (If Applicable):  7/11/3303    Next Visit Date:    6/14/2021

## 2021-05-31 DIAGNOSIS — G47.33 OSA ON CPAP: ICD-10-CM

## 2021-05-31 DIAGNOSIS — J42 CHRONIC BRONCHITIS, UNSPECIFIED CHRONIC BRONCHITIS TYPE (HCC): ICD-10-CM

## 2021-05-31 DIAGNOSIS — M16.11 PRIMARY OSTEOARTHRITIS OF RIGHT HIP: ICD-10-CM

## 2021-05-31 DIAGNOSIS — E78.2 MIXED HYPERLIPIDEMIA: ICD-10-CM

## 2021-05-31 DIAGNOSIS — M62.830 BACK MUSCLE SPASM: ICD-10-CM

## 2021-05-31 DIAGNOSIS — I10 ESSENTIAL HYPERTENSION: ICD-10-CM

## 2021-05-31 DIAGNOSIS — Z99.89 OSA ON CPAP: ICD-10-CM

## 2021-05-31 DIAGNOSIS — I87.2 VENOUS INSUFFICIENCY (CHRONIC) (PERIPHERAL): ICD-10-CM

## 2021-05-31 DIAGNOSIS — E11.8 TYPE 2 DIABETES MELLITUS WITH COMPLICATION, WITHOUT LONG-TERM CURRENT USE OF INSULIN (HCC): ICD-10-CM

## 2021-05-31 DIAGNOSIS — I89.0 LYMPHEDEMA OF BOTH LOWER EXTREMITIES: ICD-10-CM

## 2021-05-31 DIAGNOSIS — F17.200 SMOKING: ICD-10-CM

## 2021-05-31 DIAGNOSIS — E66.01 MORBID OBESITY (HCC): ICD-10-CM

## 2021-05-31 DIAGNOSIS — R21 RASH OF BACK: ICD-10-CM

## 2021-05-31 DIAGNOSIS — M19.90 ARTHRITIS: ICD-10-CM

## 2021-05-31 DIAGNOSIS — M26.629 TEMPOROMANDIBULAR JOINT-PAIN-DYSFUNCTION SYNDROME (TMJ): ICD-10-CM

## 2021-06-01 RX ORDER — MELOXICAM 15 MG/1
TABLET ORAL
Qty: 90 TABLET | Refills: 0 | Status: SHIPPED | OUTPATIENT
Start: 2021-06-01 | End: 2021-09-16

## 2021-06-01 NOTE — TELEPHONE ENCOUNTER
Terrell Doan is requesting a refill on the following medication(s):  Requested Prescriptions     Pending Prescriptions Disp Refills    meloxicam (MOBIC) 15 MG tablet [Pharmacy Med Name: Meloxicam 15 MG Oral Tablet] 90 tablet 0     Sig: TAKE 1 TABLET BY MOUTH ONCE DAILY AS NEEDED FOR PAIN       Last Visit Date (If Applicable):  0/29/4569    Next Visit Date:    6/14/2021

## 2021-06-14 ENCOUNTER — OFFICE VISIT (OUTPATIENT)
Dept: FAMILY MEDICINE CLINIC | Age: 56
End: 2021-06-14
Payer: MEDICARE

## 2021-06-14 VITALS — HEART RATE: 96 BPM | OXYGEN SATURATION: 98 % | DIASTOLIC BLOOD PRESSURE: 80 MMHG | SYSTOLIC BLOOD PRESSURE: 128 MMHG

## 2021-06-14 DIAGNOSIS — F17.210 CIGARETTE SMOKER: ICD-10-CM

## 2021-06-14 DIAGNOSIS — E66.01 MORBID OBESITY (HCC): ICD-10-CM

## 2021-06-14 DIAGNOSIS — I10 ESSENTIAL HYPERTENSION: ICD-10-CM

## 2021-06-14 DIAGNOSIS — K21.9 GASTROESOPHAGEAL REFLUX DISEASE WITHOUT ESOPHAGITIS: ICD-10-CM

## 2021-06-14 DIAGNOSIS — E11.8 TYPE 2 DIABETES MELLITUS WITH COMPLICATION, WITHOUT LONG-TERM CURRENT USE OF INSULIN (HCC): Primary | ICD-10-CM

## 2021-06-14 LAB — HBA1C MFR BLD: 7 %

## 2021-06-14 PROCEDURE — 99213 OFFICE O/P EST LOW 20 MIN: CPT

## 2021-06-14 PROCEDURE — 83036 HEMOGLOBIN GLYCOSYLATED A1C: CPT | Performed by: INTERNAL MEDICINE

## 2021-06-14 PROCEDURE — 99214 OFFICE O/P EST MOD 30 MIN: CPT | Performed by: INTERNAL MEDICINE

## 2021-06-14 PROCEDURE — 3051F HG A1C>EQUAL 7.0%<8.0%: CPT | Performed by: INTERNAL MEDICINE

## 2021-06-14 NOTE — PROGRESS NOTES
[address]  [address]  Dept: [phone]  Dept Fax: [phone]: [phone]     Visit Date:  6/14/2021    Patient:  Kandi Clements  YOB: 1965    HPI:   Kandi Clements presents today for   Chief Complaint   Patient presents with    1 Month Follow-Up     patient is here today for a one month follow up. He is doing well. Neftali Rivera HPI 68-year-old male with a history of morbid obesity with a BMI of 61, TEOFILO on CPAP, pulmonary hypertension, chronic bronchitis with emphysema, smoking history currently half a pack a day, chronic venous insufficiency/peripheral edema/stasis ulcers/diabetic ulcers/lymphedema, hypertension, diabetes mellitus type 2, dyslipidemia, CAD, status post couple of right hip replacement surgeries and multiple dislocations in the past is coming in for DM check up. DM type 2- A1C 7.0. big drop from last time which was above 9.0. Compliant with Metformin 1000 mg BID. No side effects with the medicine. Fasting Blood sugars from 110-140s. Does not check premeals or post meals. Lost 62 lbs in 3 months! !!Cut back on pops/sodas. Hoping with 100 lbs weight loss he will be able to get the bariartic surgery. Mobilization is much better. Able to do yard work. Drinking more propel drinks/V8 juices/gatorade. Smoker- Continues to smoke used to be on 3ppd but now only 4-5 cigarettes a day. Wellbutyrin helping. Motivated to quit. Arthritis/Numbness/neuropathy/muscle spasms- On flexeril/Mobic. Educated on minimizing use of NSAIDs. Chronic lower extremity edema/venous insufficiency/lymphedema-On Lasix +Potassium much better. Does not go to lymphedema clinic anymore. No open wounds or sores or leakage. Medications  Prior to Visit Medications    Medication Sig Taking?  Authorizing Provider   meloxicam (MOBIC) 15 MG tablet TAKE 1 TABLET BY MOUTH ONCE DAILY AS NEEDED FOR PAIN Yes Kanchan Glover MD   cyclobenzaprine (FLEXERIL) 10 MG tablet Take 1 tablet by mouth three times daily as needed for muscle spasm Yes Kanchan Glover MD   omeprazole (PRILOSEC) 40 MG delayed release capsule Take 1 capsule by mouth every morning (before breakfast) Yes Kanchan Glover MD   famotidine (PEPCID) 20 MG tablet Take 1 tablet by mouth 2 times daily Yes Kanchan Glover MD   lisinopril (PRINIVIL;ZESTRIL) 20 MG tablet Take 1 tablet by mouth daily Yes Kanchan Glover MD   atorvastatin (LIPITOR) 40 MG tablet Take 1 tablet by mouth daily Yes Kanchan Glover MD   metFORMIN (GLUCOPHAGE) 1000 MG tablet Take 1 tablet by mouth 2 times daily (with meals) Yes Kanchan Glover MD   blood glucose monitor kit and supplies Dispense sufficient amount for indicated testing frequency. Dispense all needed supplies to include: monitor, strips, lancing device, lancets, control solutions, alcohol swabs. Yes Kanchan Glover MD   Lancets MISC 1 each by Does not apply route 2 times daily Yes Kanchan Glover MD   blood glucose monitor strips Test 2 times a day & as needed for symptoms of irregular blood glucose. Dispense sufficient amount for indicated testing frequency.  Yes Kanchan Glover MD   SILVER SULFADIAZINE EX silver sulfADIAZINE Silver Sulfadiazine Active 1 APPLICATIO TP TWICE A DAY 25 March 30th, 2021 10:11am 03-  [facility] ([id number]) Yes Historical Provider, MD   NYSTATIN [id number] UNIT/GM powder  Yes Historical Provider, MD   potassium chloride (KLOR-CON M) 20 MEQ TBCR extended release tablet  Yes Historical Provider, MD   furosemide (LASIX) 40 MG tablet TAKE 1 TABLET BY MOUTH TWICE DAILY Yes Historical Provider, MD   SITagliptin (JANUVIA) 100 MG tablet Take 1 tablet by mouth daily Yes Kanchan Glover MD   Multiple Minerals-Vitamins (CALCIUM-MAGNESIUM-ZINC-D3) TABS Take by mouth Yes Historical Provider, MD   buPROPion (WELLBUTRIN SR) 150 MG extended release tablet Take 1 tablet by mouth daily Yes Jessica Malloy MD   triamcinolone (ARISTOCORT) 0.5 % cream Apply topically 3 times daily. Yes Jessica Malloy MD   aspirin 81 MG tablet Take 81 mg by mouth daily Yes Historical Provider, MD   docusate sodium (COLACE) 100 MG capsule TAKE ONE CAPSULE BY MOUTH TWICE DAILY Yes Historical Provider, MD   mupirocin (BACTROBAN) 2 % ointment APPLY A SMALL AMOUNT TO EACH NOSTRIL TWICE DAILY STARTING 5 DAYS PRIOR TO SURGERY Yes Historical Provider, MD   Acetaminophen (TYLENOL ARTHRITIS PAIN PO) Take 1 tablet by mouth as needed Yes Historical Provider, MD   Multiple Vitamin (MULTI-VITAMIN DAILY) TABS Take by mouth Yes Historical Provider, MD   Handicap Placard MISC by Does not apply route Yes Historical Provider, MD   CPAP Machine MISC by Does not apply route Yes Historical Provider, MD        Allergies:  has No Known Allergies. Past Medical History:   has a past medical history of Hyperlipidemia, Hypertriglyceridemia, Obesity, and Tobacco abuse. Past Surgical History   has a past surgical history that includes Abscess Drainage (08/2014); Total hip arthroplasty (Right, 12/2018); and Revision total hip arthroplasty (12/2019). Family History  family history includes Cancer in his father; Diabetes in his mother; Other in his brother. Social History   reports that he has been smoking cigarettes. He has been smoking about 0.37 packs per day. He has never used smokeless tobacco. He reports previous drug use. He reports that he does not drink alcohol.     Health Maintenance:    Health Maintenance   Topic Date Due    Pneumococcal 0-64 years Vaccine (1 of 2 - PPSV23) Never done    COVID-19 Vaccine (1) Never done    Hepatitis B vaccine (1 of 3 - Risk 3-dose series) Never done    Shingles Vaccine (1 of 2) Never done    Colon cancer screen colonoscopy  Never done    DTaP/Tdap/Td vaccine (2 - Td or Tdap) 07/13/2017    Annual Wellness Visit (AWV)  Never done    Diabetic microalbuminuria test  07/29/2020  HIV screen  10/17/2027 (Originally 6/29/1980)    Lipid screen  08/07/2021    Flu vaccine (Season Ended) 09/01/2021    Diabetic retinal exam  03/18/2022    Potassium monitoring  04/15/2022    Creatinine monitoring  04/15/2022    Diabetic foot exam  05/12/2022    A1C test (Diabetic or Prediabetic)  06/14/2022    Hepatitis C screen  Completed    Hepatitis A vaccine  Aged Out    Hib vaccine  Aged Out    Meningococcal (ACWY) vaccine  Aged Out       Subjective:      Review of Systems   Constitutional: Negative for fatigue, fever and unexpected weight change. HENT: Negative for ear pain, postnasal drip, rhinorrhea, sinus pain, sore throat and trouble swallowing. Eyes: Negative for visual disturbance. Respiratory: Negative for cough, chest tightness and shortness of breath. Cardiovascular: Negative for chest pain and leg swelling. Gastrointestinal: Negative for abdominal pain, blood in stool and diarrhea. Endocrine: Negative for polyuria. Genitourinary: Negative for difficulty urinating and flank pain. Musculoskeletal: Negative for arthralgias, joint swelling and myalgias. Skin: Negative for rash. Allergic/Immunologic: Negative for environmental allergies. Neurological: Negative for weakness, light-headedness, numbness and headaches. Hematological: Negative for adenopathy. Psychiatric/Behavioral: Negative for behavioral problems and suicidal ideas. The patient is not nervous/anxious. Objective:     /80 (Site: Right Lower Arm, Position: Sitting)   Pulse 96   SpO2 98%     Physical Exam  Vitals and nursing note reviewed. Constitutional:       Appearance: He is obese. Comments: In a wheelchair   HENT:      Head: Normocephalic and atraumatic. Cardiovascular:      Rate and Rhythm: Normal rate and regular rhythm. Pulmonary:      Effort: Pulmonary effort is normal.      Breath sounds: Normal breath sounds. No stridor. Abdominal:      General: Abdomen is flat.

## 2021-06-15 ENCOUNTER — TELEPHONE (OUTPATIENT)
Dept: PHARMACY | Facility: CLINIC | Age: 56
End: 2021-06-15

## 2021-06-15 NOTE — TELEPHONE ENCOUNTER
[facility]: STATIN THERAPY REVIEW  Identified statin use in persons with diabetes care gap per [employer]. Records dated: 5/22/21. Last Office Visit: 6/14/21    Patient also appears to be taking: lisinopril and metformin. Patient not found in Outcomes MTM    Per [employer] Claims:   · Lisinopril 20 mg last filled on 5/14/21 for 90 day supply. Next refill due 8/12/21. · PDCYTD = 26%; Potential Fail Date: 8/26/21  · Metformin 1000 mg last filled on 5/14/21 for 90 day supply. Next refill due 8/12/21. STATIN GAP IDENTIFIED    Per chart review, patient is prescribed atorvastatin 40 mg daily. New prescription sent to [address] by clinical pharmacist on 5/14/21. Will route to PharmD candidate to confirm most recent refill data as well as insurance billed.     Daniela Quinn, PharmD, [facility]  Direct: [phone]  Department, toll free [phone], option 7

## 2021-06-16 NOTE — TELEPHONE ENCOUNTER
Thank you! Will sign off at this time.      Catie Rivera, PharmD, [facility]  Direct: [phone]  Department, toll free [phone], option [phone] [facility] in place:  No   Gap Closed?: Yes    Total # of Interventions Recommended: 0   Total # of Interventions Accepted: 0   Time Spent (min): 10

## 2021-06-17 ASSESSMENT — ENCOUNTER SYMPTOMS
CHEST TIGHTNESS: 0
COUGH: 0
DIARRHEA: 0
RHINORRHEA: 0
BLOOD IN STOOL: 0
SINUS PAIN: 0
SHORTNESS OF BREATH: 0
TROUBLE SWALLOWING: 0
ABDOMINAL PAIN: 0
SORE THROAT: 0

## 2021-07-19 DIAGNOSIS — M16.11 PRIMARY OSTEOARTHRITIS OF RIGHT HIP: ICD-10-CM

## 2021-07-19 DIAGNOSIS — I87.2 VENOUS INSUFFICIENCY (CHRONIC) (PERIPHERAL): ICD-10-CM

## 2021-07-19 DIAGNOSIS — I10 ESSENTIAL HYPERTENSION: ICD-10-CM

## 2021-07-19 DIAGNOSIS — M62.830 BACK MUSCLE SPASM: ICD-10-CM

## 2021-07-19 DIAGNOSIS — Z99.89 OSA ON CPAP: ICD-10-CM

## 2021-07-19 DIAGNOSIS — E11.8 TYPE 2 DIABETES MELLITUS WITH COMPLICATION, WITHOUT LONG-TERM CURRENT USE OF INSULIN (HCC): ICD-10-CM

## 2021-07-19 DIAGNOSIS — I89.0 LYMPHEDEMA OF BOTH LOWER EXTREMITIES: ICD-10-CM

## 2021-07-19 DIAGNOSIS — G47.33 OSA ON CPAP: ICD-10-CM

## 2021-07-19 DIAGNOSIS — E66.01 MORBID OBESITY (HCC): ICD-10-CM

## 2021-07-19 DIAGNOSIS — M19.90 ARTHRITIS: ICD-10-CM

## 2021-07-19 DIAGNOSIS — J42 CHRONIC BRONCHITIS, UNSPECIFIED CHRONIC BRONCHITIS TYPE (HCC): ICD-10-CM

## 2021-07-19 DIAGNOSIS — R21 RASH OF BACK: ICD-10-CM

## 2021-07-19 DIAGNOSIS — F17.200 SMOKING: ICD-10-CM

## 2021-07-19 DIAGNOSIS — M26.629 TEMPOROMANDIBULAR JOINT-PAIN-DYSFUNCTION SYNDROME (TMJ): ICD-10-CM

## 2021-07-19 DIAGNOSIS — E78.2 MIXED HYPERLIPIDEMIA: ICD-10-CM

## 2021-07-19 RX ORDER — CYCLOBENZAPRINE HCL 10 MG
TABLET ORAL
Qty: 90 TABLET | Refills: 0 | Status: SHIPPED | OUTPATIENT
Start: 2021-07-19 | End: 2021-09-07

## 2021-07-19 NOTE — TELEPHONE ENCOUNTER
Agata Sidhu is requesting a refill on the following medication(s):  Requested Prescriptions     Pending Prescriptions Disp Refills    cyclobenzaprine (FLEXERIL) 10 MG tablet [Pharmacy Med Name: Cyclobenzaprine HCl 10 MG Oral Tablet] 90 tablet 0     Sig: Take 1 tablet by mouth three times daily as needed for muscle spasm       Last Visit Date (If Applicable):  3/71/6971    Next Visit Date:    Visit date not found

## 2021-07-27 ENCOUNTER — OFFICE VISIT (OUTPATIENT)
Dept: PODIATRY | Age: 56
End: 2021-07-27
Payer: MEDICARE

## 2021-07-27 VITALS — HEART RATE: 84 BPM | SYSTOLIC BLOOD PRESSURE: 128 MMHG | DIASTOLIC BLOOD PRESSURE: 66 MMHG | RESPIRATION RATE: 24 BRPM

## 2021-07-27 DIAGNOSIS — I89.0 LYMPHEDEMA: ICD-10-CM

## 2021-07-27 DIAGNOSIS — B35.1 DERMATOPHYTOSIS OF NAIL: ICD-10-CM

## 2021-07-27 DIAGNOSIS — E11.51 CONTROLLED TYPE 2 DM WITH PERIPHERAL CIRCULATORY DISORDER (HCC): Primary | ICD-10-CM

## 2021-07-27 PROCEDURE — 99999 PR OFFICE/OUTPT VISIT,PROCEDURE ONLY: CPT | Performed by: PODIATRIST

## 2021-07-27 PROCEDURE — 11720 DEBRIDE NAIL 1-5: CPT | Performed by: PODIATRIST

## 2021-07-27 NOTE — PROGRESS NOTES
Subjective:  Patient presents to [facility] today for routine diabetic foot care. Patient denies any new problems with their feet. Patient's diabetic control has been not changed. No Known Allergies    Past Medical History:   Diagnosis Date    Hyperlipidemia     Hypertriglyceridemia     Obesity     Tobacco abuse        Prior to Admission medications    Medication Sig Start Date End Date Taking? Authorizing Provider   cyclobenzaprine (FLEXERIL) 10 MG tablet Take 1 tablet by mouth three times daily as needed for muscle spasm 7/19/21  Yes Carolynn Vasquez MD   meloxicam (MOBIC) 15 MG tablet TAKE 1 TABLET BY MOUTH ONCE DAILY AS NEEDED FOR PAIN 6/1/21  Yes Carolynn Vasquez MD   omeprazole (PRILOSEC) 40 MG delayed release capsule Take 1 capsule by mouth every morning (before breakfast) 5/14/21  Yes Carolynn Vasquez MD   famotidine (PEPCID) 20 MG tablet Take 1 tablet by mouth 2 times daily 5/14/21  Yes Carolynn Vasquez MD   lisinopril (PRINIVIL;ZESTRIL) 20 MG tablet Take 1 tablet by mouth daily 5/14/21  Yes Carolynn Vasquez MD   atorvastatin (LIPITOR) 40 MG tablet Take 1 tablet by mouth daily 5/14/21  Yes Carolynn Vasquez MD   metFORMIN (GLUCOPHAGE) 1000 MG tablet Take 1 tablet by mouth 2 times daily (with meals) 5/14/21  Yes Carolynn Vasquez MD   blood glucose monitor kit and supplies Dispense sufficient amount for indicated testing frequency. Dispense all needed supplies to include: monitor, strips, lancing device, lancets, control solutions, alcohol swabs. 4/23/21  Yes Carolynn Vasquez MD   Lancets MISC 1 each by Does not apply route 2 times daily 4/23/21  Yes Carolynn Vasquez MD   blood glucose monitor strips Test 2 times a day & as needed for symptoms of irregular blood glucose. Dispense sufficient amount for indicated testing frequency.  4/23/21  Yes Carolynn Vasquez MD   SILVER SULFADIAZINE EX silver sulfADIAZINE Silver Sulfadiazine Active 1 APPLICATIO TP TWICE A DAY 25 March 30th, 2021 10:11am 03- bilateral.  Temperature decreased bilateral. Hyperpigmentation present bilateral. Atrophic skin yes. Neurological: Sensation intact to light touch to level of digits, bilateral.  Protective sensation intact 10/10 sites via 5.07/10g [place]-Matilde Monofilament, bilateral.  negative Tinel's, bilateral.  negative Valleix sign, bilateral.  Vibratory intact bilateral.  Reflexes Decreased bilateral.  Paresthesias negative. Dysthesias negative. Sharp/dull intact bilateral.    Integument:  Open lesion absent, Bilateral.  Interdigital maceration absent to web spaces,absent Bilateral.  Nails left 5 and right 1, 5 thickened, dystrophic and crumbly, discolored with subungual debris. Fissures absent, Bilateral. Hyperkeratotic tissue is absent. Assessment:    Diagnosis Orders   1. Controlled type 2 DM with peripheral circulatory disorder (HCC)     2. Lymphedema     3. Dermatophytosis of nail         Plan:  Diabetic foot education and exam.  Nails as mentioned above debrided in length and thickness. Patient advised about OTC treatments for nails and callous. Patient will follow up in 10 weeks for routine foot care or PRN if any further problems arise.

## 2021-07-27 NOTE — PROGRESS NOTES
Foot Care Worksheet  PCP: Celestina Yang MD  Last visit: 06 / 14 / 2021    Nail description:  Thick , Yellow , Crumbly , Marked limitation of ambulation     Pain resulting from thickened and dystrophy of nail plate No    Nails involved  Right   1 5 (T5-T9)  Left     5  (TA-T4)    Q7 1 Class A Finding - Non traumatic amputation of foot No    Q8 2 Class B Findings - Absent DP pulse No, Absent PT pulse No, Advanced tropic changes (3 required) No    Decrease hair growth Yes, Nail changes/thickening Yes, Pigmented changes/discoloration Yes, Skin texture (thin, shiny) Yes, Skin color (rubor/redness) No    Q9 1 Class B and 2 Class C Findings  Claudication No, Temperature change Yes, Paresthesia No, Burning No, Edema Yes

## 2021-08-20 ENCOUNTER — TELEPHONE (OUTPATIENT)
Dept: PHARMACY | Facility: CLINIC | Age: 56
End: 2021-08-20

## 2021-08-20 NOTE — TELEPHONE ENCOUNTER
[facility] CLINICAL PHARMACY REVIEW: ADHERENCE REVIEW  Identified care gap per [employer]; fills at [place] [facility]: ACE/ARB and Diabetes adherence    Last Visit: 6/14/21    Patient also appears to be prescribed: atorvastatin     Patient not found in Outcomes MTM    ASSESSMENT  ACE/ARB ADHERENCE    Per Insurance Records through 8/7/21 (YTSTEVE Yoseph Schulteandra = 68%; Potential Fail Date: 8/26/21):   Lisinopril last filled on 5/14/21 for 90 day supply. Next refill due: 8/12/21    Per [facility]:  Lisinopril last filled on 8/14/21 for 90 day supply. Per [facility]:   Lisinopril last picked up on 8/14/21 for 90 day supply. There are refills remaining. BP Readings from Last 3 Encounters:   07/27/21 128/66   06/14/21 128/80   05/14/21 136/70     CrCl cannot be calculated (Patient's most recent lab result is older than the maximum 120 days allowed. ). DIABETES ADHERENCE    Per Insurance Records through 8/7/21 ([place] = 88%; Potential Fail Date: 9/20/21): Metformin last filled on 5/14/21 for 90 day supply. Next refill due: 8/12/21    Per [facility] Records:  Metformin last filled on 8/14/21 for 90 day supply. Per [facility]:   Metformin last picked up on 8/14/21 for 90 day supply. There are refills remaining. Lab Results   Component Value Date    LABA1C 7.0 06/14/2021    LABA1C 6.6 10/15/2020    LABA1C 7.5 07/16/2020     NOTE A1c <9%    STATIN ADHERENCE    Per Insurance Records through 8/7/21: no insurance claims to review     Per [address]:   Atorvastatin last picked up on 5/31/21 for 90 day supply. New prescription on file from 5/14/21. Billed through discount card. Pharmacy staff processed 90-ds of atorvastatin and billed through [employer].  Co-pay $0.00    Lab Results   Component Value Date    CHOL 128 08/07/2020    TRIG 124 08/07/2020    HDL 36 (L) 08/07/2020    LDLCHOLESTEROL 67 08/07/2020    LDLCALC 65.6 07/29/2019     ALT   Date Value Ref Range Status   08/07/2020 30 5 - 41 U/L Final     AST Date Value Ref Range Status   08/07/2020 26 <40 U/L Final     The ASCVD Risk score (Ev Sweet, et al., 2013) failed to calculate for the following reasons: The valid total cholesterol range is 130 to 320 mg/dL     PLAN  Patient refilled lisinopril and metformin on time. Not yet due to refill atorvastatin (due around 8/30/21), but pharmacy is processing refill today for $0.0 co-pay with [employer]. Attempting to reach patient to review.  Left message asking for return call. Will attempt to contact the patient again next week to discuss atorvastatin.      Future Appointments   Date Time Provider Elisa Valencia   9/14/2021  9:00 AM Karyna Rolon MD DNAP [facility]   12/8/2021  1:30 PM Jian Uribe DPM [facility]       Tomas Urban, PharmD, Randy // Department, toll free [phone], option 1

## 2021-08-23 NOTE — TELEPHONE ENCOUNTER
Patient's wife called back regarding adherence call. She stated that patient is getting low on Atorvastatin at this point and did receive text from pharmacy telling her that it was being processed for the next refill. Stated patient was taking as directed. They are going to  Atorvastatin later in the week.      Corrie Marino, [address] [facility]   Phone: [phone]

## 2021-08-23 NOTE — TELEPHONE ENCOUNTER
Noted clinical support specialists call - will sign off at this time.      Jocelyn Littlejohn, PharmD, Randy // Department, toll free [phone], option 1      For Rojas Smith in place:  No   Recommendation Provided To: Patient/Caregiver: 1 via Telephone and Pharmacy: 1   Intervention Detail: Adherence Monitorin   Gap Closed?: Yes    Intervention Accepted By: Patient/Caregiver: 1 and Pharmacy: 1   Time Spent (min): 20

## 2021-09-04 DIAGNOSIS — M62.830 BACK MUSCLE SPASM: ICD-10-CM

## 2021-09-04 DIAGNOSIS — E78.2 MIXED HYPERLIPIDEMIA: ICD-10-CM

## 2021-09-04 DIAGNOSIS — G47.33 OSA ON CPAP: ICD-10-CM

## 2021-09-04 DIAGNOSIS — I89.0 LYMPHEDEMA OF BOTH LOWER EXTREMITIES: ICD-10-CM

## 2021-09-04 DIAGNOSIS — J42 CHRONIC BRONCHITIS, UNSPECIFIED CHRONIC BRONCHITIS TYPE (HCC): ICD-10-CM

## 2021-09-04 DIAGNOSIS — F17.200 SMOKING: ICD-10-CM

## 2021-09-04 DIAGNOSIS — E66.01 MORBID OBESITY (HCC): ICD-10-CM

## 2021-09-04 DIAGNOSIS — M16.11 PRIMARY OSTEOARTHRITIS OF RIGHT HIP: ICD-10-CM

## 2021-09-04 DIAGNOSIS — E11.8 TYPE 2 DIABETES MELLITUS WITH COMPLICATION, WITHOUT LONG-TERM CURRENT USE OF INSULIN (HCC): ICD-10-CM

## 2021-09-04 DIAGNOSIS — M26.629 TEMPOROMANDIBULAR JOINT-PAIN-DYSFUNCTION SYNDROME (TMJ): ICD-10-CM

## 2021-09-04 DIAGNOSIS — R21 RASH OF BACK: ICD-10-CM

## 2021-09-04 DIAGNOSIS — I10 ESSENTIAL HYPERTENSION: ICD-10-CM

## 2021-09-04 DIAGNOSIS — M19.90 ARTHRITIS: ICD-10-CM

## 2021-09-04 DIAGNOSIS — Z99.89 OSA ON CPAP: ICD-10-CM

## 2021-09-04 DIAGNOSIS — I87.2 VENOUS INSUFFICIENCY (CHRONIC) (PERIPHERAL): ICD-10-CM

## 2021-09-07 RX ORDER — CYCLOBENZAPRINE HCL 10 MG
TABLET ORAL
Qty: 90 TABLET | Refills: 0 | Status: SHIPPED | OUTPATIENT
Start: 2021-09-07 | End: 2021-10-21

## 2021-09-07 NOTE — TELEPHONE ENCOUNTER
Juan Ramon Smith is requesting a refill on the following medication(s):  Requested Prescriptions     Pending Prescriptions Disp Refills    cyclobenzaprine (FLEXERIL) 10 MG tablet [Pharmacy Med Name: Cyclobenzaprine HCl 10 MG Oral Tablet] 90 tablet 0     Sig: Take 1 tablet by mouth three times daily as needed for muscle spasm       Last Visit Date (If Applicable):  0/13/7230    Next Visit Date:    Visit date not found

## 2021-09-15 DIAGNOSIS — R21 RASH OF BACK: ICD-10-CM

## 2021-09-15 DIAGNOSIS — M26.629 TEMPOROMANDIBULAR JOINT-PAIN-DYSFUNCTION SYNDROME (TMJ): ICD-10-CM

## 2021-09-15 DIAGNOSIS — E78.2 MIXED HYPERLIPIDEMIA: ICD-10-CM

## 2021-09-15 DIAGNOSIS — I87.2 VENOUS INSUFFICIENCY (CHRONIC) (PERIPHERAL): ICD-10-CM

## 2021-09-15 DIAGNOSIS — M16.11 PRIMARY OSTEOARTHRITIS OF RIGHT HIP: ICD-10-CM

## 2021-09-15 DIAGNOSIS — F17.200 SMOKING: ICD-10-CM

## 2021-09-15 DIAGNOSIS — M19.90 ARTHRITIS: ICD-10-CM

## 2021-09-15 DIAGNOSIS — M62.830 BACK MUSCLE SPASM: ICD-10-CM

## 2021-09-15 DIAGNOSIS — E11.8 TYPE 2 DIABETES MELLITUS WITH COMPLICATION, WITHOUT LONG-TERM CURRENT USE OF INSULIN (HCC): ICD-10-CM

## 2021-09-15 DIAGNOSIS — I10 ESSENTIAL HYPERTENSION: ICD-10-CM

## 2021-09-15 DIAGNOSIS — G47.33 OSA ON CPAP: ICD-10-CM

## 2021-09-15 DIAGNOSIS — J42 CHRONIC BRONCHITIS, UNSPECIFIED CHRONIC BRONCHITIS TYPE (HCC): ICD-10-CM

## 2021-09-15 DIAGNOSIS — E66.01 MORBID OBESITY (HCC): ICD-10-CM

## 2021-09-15 DIAGNOSIS — Z99.89 OSA ON CPAP: ICD-10-CM

## 2021-09-15 DIAGNOSIS — I89.0 LYMPHEDEMA OF BOTH LOWER EXTREMITIES: ICD-10-CM

## 2021-09-16 RX ORDER — MELOXICAM 15 MG/1
TABLET ORAL
Qty: 90 TABLET | Refills: 0 | Status: SHIPPED | OUTPATIENT
Start: 2021-09-16 | End: 2021-12-13

## 2021-09-16 NOTE — TELEPHONE ENCOUNTER
Preston Tovar is requesting a refill on the following medication(s):  Requested Prescriptions     Pending Prescriptions Disp Refills    meloxicam (MOBIC) 15 MG tablet [Pharmacy Med Name: Meloxicam 15 MG Oral Tablet] 90 tablet 0     Sig: TAKE 1 TABLET BY MOUTH ONCE DAILY AS NEEDED FOR PAIN       Last Visit Date (If Applicable):  8/32/0934    Next Visit Date:    Visit date not found

## 2021-10-21 DIAGNOSIS — F17.200 SMOKING: ICD-10-CM

## 2021-10-21 DIAGNOSIS — E66.01 MORBID OBESITY (HCC): ICD-10-CM

## 2021-10-21 DIAGNOSIS — I87.2 VENOUS INSUFFICIENCY (CHRONIC) (PERIPHERAL): ICD-10-CM

## 2021-10-21 DIAGNOSIS — R21 RASH OF BACK: ICD-10-CM

## 2021-10-21 DIAGNOSIS — M16.11 PRIMARY OSTEOARTHRITIS OF RIGHT HIP: ICD-10-CM

## 2021-10-21 DIAGNOSIS — I10 ESSENTIAL HYPERTENSION: ICD-10-CM

## 2021-10-21 DIAGNOSIS — I89.0 LYMPHEDEMA OF BOTH LOWER EXTREMITIES: ICD-10-CM

## 2021-10-21 DIAGNOSIS — E11.8 TYPE 2 DIABETES MELLITUS WITH COMPLICATION, WITHOUT LONG-TERM CURRENT USE OF INSULIN (HCC): ICD-10-CM

## 2021-10-21 DIAGNOSIS — J42 CHRONIC BRONCHITIS, UNSPECIFIED CHRONIC BRONCHITIS TYPE (HCC): ICD-10-CM

## 2021-10-21 DIAGNOSIS — Z99.89 OSA ON CPAP: ICD-10-CM

## 2021-10-21 DIAGNOSIS — M62.830 BACK MUSCLE SPASM: ICD-10-CM

## 2021-10-21 DIAGNOSIS — G47.33 OSA ON CPAP: ICD-10-CM

## 2021-10-21 DIAGNOSIS — E78.2 MIXED HYPERLIPIDEMIA: ICD-10-CM

## 2021-10-21 DIAGNOSIS — M26.629 TEMPOROMANDIBULAR JOINT-PAIN-DYSFUNCTION SYNDROME (TMJ): ICD-10-CM

## 2021-10-21 DIAGNOSIS — M19.90 ARTHRITIS: ICD-10-CM

## 2021-10-21 RX ORDER — CYCLOBENZAPRINE HCL 10 MG
TABLET ORAL
Qty: 90 TABLET | Refills: 0 | Status: SHIPPED | OUTPATIENT
Start: 2021-10-21 | End: 2021-12-13

## 2021-10-21 NOTE — TELEPHONE ENCOUNTER
Kathy Gloria is requesting a refill on the following medication(s):  Requested Prescriptions     Pending Prescriptions Disp Refills    cyclobenzaprine (FLEXERIL) 10 MG tablet [Pharmacy Med Name: Cyclobenzaprine HCl 10 MG Oral Tablet] 90 tablet 0     Sig: Take 1 tablet by mouth three times daily as needed for muscle spasm       Last Visit Date (If Applicable):  0/35/6509    Next Visit Date:    Visit date not found

## 2021-11-15 ENCOUNTER — TELEPHONE (OUTPATIENT)
Dept: PHARMACY | Facility: CLINIC | Age: 56
End: 2021-11-15

## 2021-11-15 NOTE — TELEPHONE ENCOUNTER
[facility] CLINICAL PHARMACY REVIEW: ADHERENCE REVIEW  Identified care gap per [employer]; fills at [facility]: ACE/ARB, Diabetes and Statin adherence    Last Visit: 6/14/21    Patient found in Outcomes MTM and is not currently eligible for CMR/TIP    ASSESSMENT  ACE/ARB ADHERENCE    Per Insurance Records through 10/24/21 ([facility] Yoseph Osorio = 79%; Potential Fail Date: 11/24/21):   Lisinopril last filled on 8/14/21 for 90 day supply. Next refill due: 11/12/21    Unable to contact [address] - on hold > 5 minutes. BP Readings from Last 3 Encounters:   07/27/21 128/66   06/14/21 128/80   05/14/21 136/70     CrCl cannot be calculated (Patient's most recent lab result is older than the maximum 120 days allowed. ). DIABETES ADHERENCE    Per Insurance Records through 10/24/21 ([facility] Yoseph Osorio = 91%; Potential Fail Date: 12/19/21): Metformin last filled on 8/14/21 for 90 day supply. Next refill due: 11/12/21    Unable to contact [address] - on hold > 5 minutes. Lab Results   Component Value Date    LABA1C 7.0 06/14/2021    LABA1C 6.6 10/15/2020    LABA1C 7.5 07/16/2020     NOTE A1c <9%    STATIN ADHERENCE    Per Insurance Records through 10/24/21 ([facility] Yoseph Osorio = Filled only once; Potential Fail Date: 12/13/21): Atorvastatin last filled on 8/20/21 for 90 day supply. Next refill due: 11/18/21    Unable to contact [address] - on hold > 5 minutes. Lab Results   Component Value Date    CHOL 128 08/07/2020    TRIG 124 08/07/2020    HDL 36 (L) 08/07/2020    LDLCHOLESTEROL 67 08/07/2020    LDLCALC 65.6 07/29/2019     ALT   Date Value Ref Range Status   08/07/2020 30 5 - 41 U/L Final     AST   Date Value Ref Range Status   08/07/2020 26 <40 U/L Final     The ASCVD Risk score (Mignon Wills et al., 2013) failed to calculate for the following reasons:     The valid total cholesterol range is 130 to 320 mg/dL     PLAN  The following are interventions that have been identified:   - Patient overdue refilling lisinopril, metformin and atorvastatin and active on home medication list.     Attempting to reach patient to review.  Left message asking for return call. Patient due to refill all three medications now and should have refills on file at the pharmacy. Will attempt to contact the patient and pharmacy again tomorrow.      Future Appointments   Date Time Provider Elisa Valencia   12/8/2021  1:30 PM [facility] DPM [facility]       Leticia Curran PharmD, Randy // Department, toll free [phone], option 1

## 2021-11-16 NOTE — TELEPHONE ENCOUNTER
Second attempt to contact the patient to discuss medication adherence. Reviewed [facility] Records again:   Lisinopril last filled on 11/12/21 for 90-ds. Metformin last filled on 11/12/21 for 90-ds. Called [facility] pharmacy:   Lisinopril and metformin were both picked up last week. Atorvastatin due to refill on 11/18/21. Patient is not enrolled in auto-fill, but pharmacy staff member will send a refill reminder via text to the patient today. Will sign off at this time. Patient refilled lisinopril and metformin on time and is adherent. Atorvastatin not yet due to refill and has refills on file (Fail date: 12/13/21).      Baron De Dios, PharmD, Randy // Department, toll free [phone], option 1      For Rojas Smith in place:  No   Gap Closed?: Yes    Time Spent (min): 15

## 2021-11-19 DIAGNOSIS — Z87.11 HISTORY OF PEPTIC ULCER DISEASE: ICD-10-CM

## 2021-11-19 DIAGNOSIS — E66.01 MORBID OBESITY (HCC): ICD-10-CM

## 2021-11-19 DIAGNOSIS — L97.921 SKIN ULCER OF LEFT LOWER LEG, LIMITED TO BREAKDOWN OF SKIN (HCC): ICD-10-CM

## 2021-11-19 DIAGNOSIS — J42 CHRONIC BRONCHITIS, UNSPECIFIED CHRONIC BRONCHITIS TYPE (HCC): ICD-10-CM

## 2021-11-19 DIAGNOSIS — R11.10 DRY HEAVES: ICD-10-CM

## 2021-11-19 DIAGNOSIS — I89.0 LYMPHEDEMA OF BOTH LOWER EXTREMITIES: ICD-10-CM

## 2021-11-19 DIAGNOSIS — I87.2 VENOUS INSUFFICIENCY (CHRONIC) (PERIPHERAL): ICD-10-CM

## 2021-11-19 DIAGNOSIS — K21.9 GASTROESOPHAGEAL REFLUX DISEASE WITHOUT ESOPHAGITIS: ICD-10-CM

## 2021-11-19 DIAGNOSIS — F17.200 SMOKER: ICD-10-CM

## 2021-11-19 RX ORDER — OMEPRAZOLE 40 MG/1
CAPSULE, DELAYED RELEASE ORAL
Qty: 90 CAPSULE | Refills: 0 | Status: SHIPPED | OUTPATIENT
Start: 2021-11-19 | End: 2022-02-21

## 2021-11-19 NOTE — TELEPHONE ENCOUNTER
Lauryn Olvera is requesting a refill on the following medication(s):  Requested Prescriptions     Pending Prescriptions Disp Refills    omeprazole (PRILOSEC) 40 MG delayed release capsule [Pharmacy Med Name: Omeprazole 40 MG Oral Capsule Delayed Release] 90 capsule 0     Sig: TAKE 1 CAPSULE BY MOUTH ONCE DAILY IN THE MORNING BEFORE BREAKFAST       Last Visit Date (If Applicable):  6/40/7699    Next Visit Date:    Visit date not found

## 2021-11-22 NOTE — TELEPHONE ENCOUNTER
Patient wife returned call and I let her know Atorvastatin still need to be refilled. She stated she'll reach out to the pharmacy to have that medication refilled and will p/u.

## 2021-12-08 ENCOUNTER — OFFICE VISIT (OUTPATIENT)
Dept: PODIATRY | Age: 56
End: 2021-12-08
Payer: MEDICARE

## 2021-12-08 VITALS
HEART RATE: 84 BPM | SYSTOLIC BLOOD PRESSURE: 126 MMHG | WEIGHT: 315 LBS | RESPIRATION RATE: 24 BRPM | BODY MASS INDEX: 55.54 KG/M2 | DIASTOLIC BLOOD PRESSURE: 78 MMHG

## 2021-12-08 DIAGNOSIS — E11.51 CONTROLLED TYPE 2 DM WITH PERIPHERAL CIRCULATORY DISORDER (HCC): Primary | ICD-10-CM

## 2021-12-08 DIAGNOSIS — I89.0 LYMPHEDEMA: ICD-10-CM

## 2021-12-08 DIAGNOSIS — B35.1 DERMATOPHYTOSIS OF NAIL: ICD-10-CM

## 2021-12-08 PROCEDURE — 11720 DEBRIDE NAIL 1-5: CPT | Performed by: PODIATRIST

## 2021-12-08 PROCEDURE — 99999 PR OFFICE/OUTPT VISIT,PROCEDURE ONLY: CPT | Performed by: PODIATRIST

## 2021-12-08 NOTE — PROGRESS NOTES
Subjective:  Patient presents to [facility] today for routine diabetic foot care. Patient denies any new problems with their feet. Patient's diabetic control has been not changed. No Known Allergies    Past Medical History:   Diagnosis Date    Hyperlipidemia     Hypertriglyceridemia     Obesity     Tobacco abuse        Prior to Admission medications    Medication Sig Start Date End Date Taking? Authorizing Provider   omeprazole (PRILOSEC) 40 MG delayed release capsule TAKE 1 CAPSULE BY MOUTH ONCE DAILY IN THE MORNING BEFORE BREAKFAST 11/19/21  Yes Judie Avendano MD   cyclobenzaprine (FLEXERIL) 10 MG tablet Take 1 tablet by mouth three times daily as needed for muscle spasm 10/21/21  Yes Judie Avendano MD   famotidine (PEPCID) 20 MG tablet Take 1 tablet by mouth twice daily 9/27/21  Yes Judie Avendano MD   meloxicam (MOBIC) 15 MG tablet TAKE 1 TABLET BY MOUTH ONCE DAILY AS NEEDED FOR PAIN 9/16/21  Yes Judie Avendano MD   lisinopril (PRINIVIL;ZESTRIL) 20 MG tablet Take 1 tablet by mouth daily 5/14/21  Yes Judie Avendano MD   atorvastatin (LIPITOR) 40 MG tablet Take 1 tablet by mouth daily 5/14/21  Yes Judie Avendano MD   metFORMIN (GLUCOPHAGE) 1000 MG tablet Take 1 tablet by mouth 2 times daily (with meals) 5/14/21  Yes Judie Avendano MD   blood glucose monitor kit and supplies Dispense sufficient amount for indicated testing frequency. Dispense all needed supplies to include: monitor, strips, lancing device, lancets, control solutions, alcohol swabs. 4/23/21  Yes Judie Avendano MD   Lancets MISC 1 each by Does not apply route 2 times daily 4/23/21  Yes Judie Avendano MD   blood glucose monitor strips Test 2 times a day & as needed for symptoms of irregular blood glucose. Dispense sufficient amount for indicated testing frequency.  4/23/21  Yes Judie Avendano MD   SILVER SULFADIAZINE EX silver sulfADIAZINE Silver Sulfadiazine Active 1 APPLICATIO TP TWICE A DAY 25 March 30th, 2021 10:11am 03-  [facility] ([id number]) 3/30/21  Yes Historical Provider, MD   NYSTATIN 906687 UNIT/GM powder  3/30/21  Yes Historical Provider, MD   potassium chloride (KLOR-CON M) 20 MEQ TBCR extended release tablet  4/10/21  Yes Historical Provider, MD   furosemide (LASIX) 40 MG tablet TAKE 1 TABLET BY MOUTH TWICE DAILY 4/9/21  Yes Historical Provider, MD   Multiple Minerals-Vitamins (CALCIUM-MAGNESIUM-ZINC-D3) TABS Take by mouth   Yes Historical Provider, MD   buPROPion [facility] SR) 150 MG extended release tablet Take 1 tablet by mouth daily 7/16/20  Yes Mary North MD   triamcinolone (ARISTOCORT) 0.5 % cream Apply topically 3 times daily. 7/16/20  Yes Mary North MD   aspirin 81 MG tablet Take 81 mg by mouth daily   Yes Historical Provider, MD   docusate sodium (COLACE) 100 MG capsule TAKE ONE CAPSULE BY MOUTH TWICE DAILY 12/17/19  Yes Historical Provider, MD   mupirocin (BACTROBAN) 2 % ointment APPLY A SMALL AMOUNT TO EACH NOSTRIL TWICE DAILY STARTING 5 DAYS PRIOR TO SURGERY 12/4/19  Yes Historical Provider, MD   Acetaminophen (TYLENOL ARTHRITIS PAIN PO) Take 1 tablet by mouth as needed   Yes Historical Provider, MD   Multiple Vitamin (MULTI-VITAMIN DAILY) TABS Take by mouth   Yes Historical Provider, MD   Handicap Placard MISC by Does not apply route   Yes Historical Provider, MD   CPAP Machine MISC by Does not apply route   Yes Historical Provider, MD       Social History     Tobacco Use    Smoking status: Current Every Day Smoker     Packs/day: 0.37     Types: Cigarettes    Smokeless tobacco: Never Used   Substance Use Topics    Alcohol use: No     ROS: All 14 ROS systems reviewed and pertinent positives noted above, all others negative.    Objective:  Vascular: DP and PT pulses palpable 2/4, bilateral.  CFT <3 seconds, bilateral.  Hair growth absent to the level of the digits, bilateral.  Edema present, bilateral.  Varicosities present, bilateral. Erythema absent, bilateral. Distal Rubor absent bilateral.  Temperature decreased bilateral. Hyperpigmentation present bilateral. Atrophic skin yes. Neurological: Sensation intact to light touch to level of digits, bilateral.  Protective sensation intact 10/10 sites via 5.07/10g [place]-Matilde Monofilament, bilateral.  negative Tinel's, bilateral.  negative Valleix sign, bilateral.  Vibratory intact bilateral.  Reflexes Decreased bilateral.  Paresthesias negative. Dysthesias negative. Sharp/dull intact bilateral.    Integument:  Open lesion absent, Bilateral.  Interdigital maceration absent to web spaces,absent Bilateral.  Nails left 5 and right 1, 5 thickened, dystrophic and crumbly, discolored with subungual debris. Fissures absent, Bilateral. Hyperkeratotic tissue is absent. Assessment:    Diagnosis Orders   1. Controlled type 2 DM with peripheral circulatory disorder (HCC)     2. Lymphedema     3. Dermatophytosis of nail         Plan:  Diabetic foot education and exam.  Nails as mentioned above debrided in length and thickness. Patient advised about OTC treatments for nails and callous. Patient will follow up in 10 weeks for routine foot care or PRN if any further problems arise.

## 2021-12-08 NOTE — PROGRESS NOTES
Foot Care Worksheet  PCP: Diamond Garcia MD  Last visit: 06 / 14 / 2021    Nail description:  Thick , Yellow , Crumbly , Marked limitation of ambulation     Pain resulting from thickened and dystrophy of nail plate No    Nails involved  Right   1 5 (T5-T9)  Left     5  (TA-T4)    Q7 1 Class A Finding - Non traumatic amputation of foot No    Q8 2 Class B Findings - Absent DP pulse No, Absent PT pulse No, Advanced tropic changes (3 required) No    Decrease hair growth Yes, Nail changes/thickening Yes, Pigmented changes/discoloration Yes, Skin texture (thin, shiny) Yes, Skin color (rubor/redness) No    Q9 1 Class B and 2 Class C Findings  Claudication No, Temperature change Yes, Paresthesia No, Burning No, Edema Yes

## 2021-12-13 DIAGNOSIS — F17.200 SMOKING: ICD-10-CM

## 2021-12-13 DIAGNOSIS — J42 CHRONIC BRONCHITIS, UNSPECIFIED CHRONIC BRONCHITIS TYPE (HCC): ICD-10-CM

## 2021-12-13 DIAGNOSIS — M62.830 BACK MUSCLE SPASM: ICD-10-CM

## 2021-12-13 DIAGNOSIS — I87.2 VENOUS INSUFFICIENCY (CHRONIC) (PERIPHERAL): ICD-10-CM

## 2021-12-13 DIAGNOSIS — E78.2 MIXED HYPERLIPIDEMIA: ICD-10-CM

## 2021-12-13 DIAGNOSIS — I89.0 LYMPHEDEMA OF BOTH LOWER EXTREMITIES: ICD-10-CM

## 2021-12-13 DIAGNOSIS — E66.01 MORBID OBESITY (HCC): ICD-10-CM

## 2021-12-13 DIAGNOSIS — E11.8 TYPE 2 DIABETES MELLITUS WITH COMPLICATION, WITHOUT LONG-TERM CURRENT USE OF INSULIN (HCC): ICD-10-CM

## 2021-12-13 DIAGNOSIS — M16.11 PRIMARY OSTEOARTHRITIS OF RIGHT HIP: ICD-10-CM

## 2021-12-13 DIAGNOSIS — R21 RASH OF BACK: ICD-10-CM

## 2021-12-13 DIAGNOSIS — I10 ESSENTIAL HYPERTENSION: ICD-10-CM

## 2021-12-13 DIAGNOSIS — M26.629 TEMPOROMANDIBULAR JOINT-PAIN-DYSFUNCTION SYNDROME (TMJ): ICD-10-CM

## 2021-12-13 DIAGNOSIS — Z99.89 OSA ON CPAP: ICD-10-CM

## 2021-12-13 DIAGNOSIS — G47.33 OSA ON CPAP: ICD-10-CM

## 2021-12-13 DIAGNOSIS — M19.90 ARTHRITIS: ICD-10-CM

## 2021-12-13 RX ORDER — MELOXICAM 15 MG/1
TABLET ORAL
Qty: 90 TABLET | Refills: 0 | Status: SHIPPED | OUTPATIENT
Start: 2021-12-13 | End: 2022-01-31

## 2021-12-13 RX ORDER — CYCLOBENZAPRINE HCL 10 MG
TABLET ORAL
Qty: 90 TABLET | Refills: 0 | Status: SHIPPED | OUTPATIENT
Start: 2021-12-13 | End: 2022-02-07

## 2021-12-13 NOTE — TELEPHONE ENCOUNTER
Holly Cross is requesting a refill on the following medication(s):  Requested Prescriptions     Pending Prescriptions Disp Refills    cyclobenzaprine (FLEXERIL) 10 MG tablet [Pharmacy Med Name: Cyclobenzaprine HCl 10 MG Oral Tablet] 90 tablet 0     Sig: Take 1 tablet by mouth three times daily as needed for muscle spasm    meloxicam (MOBIC) 15 MG tablet [Pharmacy Med Name: Meloxicam 15 MG Oral Tablet] 90 tablet 0     Sig: TAKE 1 TABLET BY MOUTH ONCE DAILY AS NEEDED FOR PAIN       Last Visit Date (If Applicable):  9/28/7806    Next Visit Date:    Visit date not found

## 2022-01-07 ENCOUNTER — OFFICE VISIT (OUTPATIENT)
Dept: FAMILY MEDICINE CLINIC | Age: 57
End: 2022-01-07
Payer: MEDICARE

## 2022-01-07 VITALS
WEIGHT: 315 LBS | TEMPERATURE: 98.1 F | OXYGEN SATURATION: 93 % | DIASTOLIC BLOOD PRESSURE: 102 MMHG | HEART RATE: 98 BPM | SYSTOLIC BLOOD PRESSURE: 152 MMHG | BODY MASS INDEX: 59.77 KG/M2

## 2022-01-07 DIAGNOSIS — E11.8 TYPE 2 DIABETES MELLITUS WITH COMPLICATION, WITHOUT LONG-TERM CURRENT USE OF INSULIN (HCC): ICD-10-CM

## 2022-01-07 DIAGNOSIS — I10 ESSENTIAL HYPERTENSION: Primary | ICD-10-CM

## 2022-01-07 DIAGNOSIS — F17.210 CIGARETTE SMOKER: ICD-10-CM

## 2022-01-07 DIAGNOSIS — I89.0 LYMPHEDEMA OF BOTH LOWER EXTREMITIES: ICD-10-CM

## 2022-01-07 DIAGNOSIS — G47.33 OBSTRUCTIVE SLEEP APNEA: ICD-10-CM

## 2022-01-07 DIAGNOSIS — E78.5 HYPERLIPIDEMIA, UNSPECIFIED HYPERLIPIDEMIA TYPE: ICD-10-CM

## 2022-01-07 DIAGNOSIS — E66.01 MORBID OBESITY (HCC): ICD-10-CM

## 2022-01-07 DIAGNOSIS — I87.2 VENOUS INSUFFICIENCY (CHRONIC) (PERIPHERAL): ICD-10-CM

## 2022-01-07 PROCEDURE — 99214 OFFICE O/P EST MOD 30 MIN: CPT | Performed by: INTERNAL MEDICINE

## 2022-01-07 PROCEDURE — 99213 OFFICE O/P EST LOW 20 MIN: CPT

## 2022-01-07 RX ORDER — CIPROFLOXACIN AND DEXAMETHASONE 3; 1 MG/ML; MG/ML
4 SUSPENSION/ DROPS AURICULAR (OTIC) 2 TIMES DAILY
Qty: 1 EACH | Refills: 0 | Status: SHIPPED | OUTPATIENT
Start: 2022-01-07 | End: 2022-01-14

## 2022-01-07 SDOH — ECONOMIC STABILITY: FOOD INSECURITY: WITHIN THE PAST 12 MONTHS, YOU WORRIED THAT YOUR FOOD WOULD RUN OUT BEFORE YOU GOT MONEY TO BUY MORE.: NEVER TRUE

## 2022-01-07 SDOH — ECONOMIC STABILITY: FOOD INSECURITY: WITHIN THE PAST 12 MONTHS, THE FOOD YOU BOUGHT JUST DIDN'T LAST AND YOU DIDN'T HAVE MONEY TO GET MORE.: NEVER TRUE

## 2022-01-07 ASSESSMENT — SOCIAL DETERMINANTS OF HEALTH (SDOH): HOW HARD IS IT FOR YOU TO PAY FOR THE VERY BASICS LIKE FOOD, HOUSING, MEDICAL CARE, AND HEATING?: NOT HARD AT ALL

## 2022-01-07 ASSESSMENT — ENCOUNTER SYMPTOMS
BLOOD IN STOOL: 0
TROUBLE SWALLOWING: 0
SORE THROAT: 0
SINUS PAIN: 0
ABDOMINAL PAIN: 0
SHORTNESS OF BREATH: 0
COUGH: 0
RHINORRHEA: 0
DIARRHEA: 0
CHEST TIGHTNESS: 0

## 2022-01-07 NOTE — PROGRESS NOTES
(MOBIC) 15 MG tablet TAKE 1 TABLET BY MOUTH ONCE DAILY AS NEEDED FOR PAIN Yes Sole Park MD   omeprazole (PRILOSEC) 40 MG delayed release capsule TAKE 1 CAPSULE BY MOUTH ONCE DAILY IN THE MORNING BEFORE BREAKFAST Yes Sole Park MD   famotidine (PEPCID) 20 MG tablet Take 1 tablet by mouth twice daily Yes Sole Park MD   lisinopril (PRINIVIL;ZESTRIL) 20 MG tablet Take 1 tablet by mouth daily Yes Sole Park MD   atorvastatin (LIPITOR) 40 MG tablet Take 1 tablet by mouth daily Yes Sole Park MD   metFORMIN (GLUCOPHAGE) 1000 MG tablet Take 1 tablet by mouth 2 times daily (with meals) Yes Sole Park MD   blood glucose monitor kit and supplies Dispense sufficient amount for indicated testing frequency. Dispense all needed supplies to include: monitor, strips, lancing device, lancets, control solutions, alcohol swabs. Yes Sole Park MD   Lancets MISC 1 each by Does not apply route 2 times daily Yes Sole Park MD   blood glucose monitor strips Test 2 times a day & as needed for symptoms of irregular blood glucose. Dispense sufficient amount for indicated testing frequency. Yes Sole Park MD   SILVER SULFADIAZINE EX silver sulfADIAZINE Silver Sulfadiazine Active 1 APPLICATIO TP TWICE A DAY 25 March 30th, 2021 10:11am 03-  [facility] ([id number]) Yes Historical Provider, MD   NYSTATIN 740741 UNIT/GM powder  Yes Historical Provider, MD   potassium chloride (KLOR-CON M) 20 MEQ TBCR extended release tablet  Yes Historical Provider, MD   furosemide (LASIX) 40 MG tablet TAKE 1 TABLET BY MOUTH TWICE DAILY Yes Historical Provider, MD   Multiple Minerals-Vitamins (CALCIUM-MAGNESIUM-ZINC-D3) TABS Take by mouth Yes Historical Provider, MD   buPROPion (WELLBUTRIN SR) 150 MG extended release tablet Take 1 tablet by mouth daily Yes Sole Park MD   triamcinolone (ARISTOCORT) 0.5 % cream Apply topically 3 times daily.  Yes Sole Park MD   aspirin 81 MG tablet Take 81 mg by mouth daily Yes Historical Provider, MD   docusate sodium (COLACE) 100 MG capsule TAKE ONE CAPSULE BY MOUTH TWICE DAILY Yes Historical Provider, MD   mupirocin (BACTROBAN) 2 % ointment APPLY A SMALL AMOUNT TO EACH NOSTRIL TWICE DAILY STARTING 5 DAYS PRIOR TO SURGERY Yes Historical Provider, MD   Acetaminophen (TYLENOL ARTHRITIS PAIN PO) Take 1 tablet by mouth as needed Yes Historical Provider, MD   Multiple Vitamin (MULTI-VITAMIN DAILY) TABS Take by mouth Yes Historical Provider, MD   Handicap Placard MISC by Does not apply route Yes Historical Provider, MD   CPAP Machine MISC by Does not apply route Yes Historical Provider, MD        Allergies:  has No Known Allergies. Past Medical History:   has a past medical history of Hyperlipidemia, Hypertriglyceridemia, Obesity, and Tobacco abuse. Past Surgical History   has a past surgical history that includes Abscess Drainage (08/2014); Total hip arthroplasty (Right, 12/2018); and Revision total hip arthroplasty (12/2019). Family History  family history includes Cancer in his father; Diabetes in his mother; Other in his brother. Social History   reports that he has been smoking cigarettes. He has been smoking about 0.37 packs per day. He has never used smokeless tobacco. He reports previous drug use. He reports that he does not drink alcohol.     Health Maintenance:    Health Maintenance   Topic Date Due    COVID-19 Vaccine (1) Never done    Pneumococcal 0-64 years Vaccine (1 of 2 - PPSV23) Never done    Hepatitis B vaccine (1 of 3 - Risk 3-dose series) Never done    Colon cancer screen colonoscopy  Never done    Shingles Vaccine (1 of 2) Never done    DTaP/Tdap/Td vaccine (2 - Td or Tdap) 07/13/2017    Annual Wellness Visit (AWV)  Never done    Diabetic microalbuminuria test  07/29/2020    Lipid screen  08/07/2021    Flu vaccine (1) 09/01/2021    HIV screen  10/17/2027 (Originally 6/29/1980)    Diabetic retinal exam  03/18/2022    Depression Screen  04/15/2022    Potassium monitoring  04/15/2022    Creatinine monitoring  04/15/2022    A1C test (Diabetic or Prediabetic)  06/14/2022    Diabetic foot exam  12/08/2022    Hepatitis C screen  Completed    Hepatitis A vaccine  Aged Out    Hib vaccine  Aged Out    Meningococcal (ACWY) vaccine  Aged Out       Subjective:      Review of Systems   Constitutional: Negative for fatigue, fever and unexpected weight change. HENT: Negative for ear pain, postnasal drip, rhinorrhea, sinus pain, sore throat and trouble swallowing. Ears leaking   Eyes: Negative for visual disturbance. Respiratory: Negative for cough, chest tightness and shortness of breath. Cardiovascular: Negative for chest pain and leg swelling. Gastrointestinal: Negative for abdominal pain, blood in stool and diarrhea. Endocrine: Negative for polyuria. Genitourinary: Negative for difficulty urinating and flank pain. Musculoskeletal: Negative for arthralgias, joint swelling and myalgias. Lump in left shoulder likely a lipoma   Skin: Negative for rash. Allergic/Immunologic: Negative for environmental allergies. Neurological: Negative for weakness, light-headedness, numbness and headaches. Hematological: Negative for adenopathy. Psychiatric/Behavioral: Negative for behavioral problems and suicidal ideas. The patient is not nervous/anxious. Objective:     BP (!) 152/102 (Site: Right Upper Arm, Position: Sitting, Cuff Size: Large Adult)   Pulse 98   Temp 98.1 °F (36.7 °C)   Wt (!) 453 lb (205.5 kg)   SpO2 93%   BMI 59.77 kg/m²     Physical Exam  Vitals and nursing note reviewed. Constitutional:       Appearance: He is obese. HENT:      Head: Normocephalic and atraumatic. Comments: Right ear - hard to visualize the TM and small ear canals. He sleeps on the right side and its mostly the right ear that's leaking.      Left Ear: Tympanic membrane, ear canal and external ear normal. Cardiovascular:      Rate and Rhythm: Normal rate and regular rhythm. Pulses: Normal pulses. Heart sounds: Normal heart sounds. Pulmonary:      Breath sounds: No stridor. Musculoskeletal:      Comments: Lipoma noticed in the left anterior shoulder region     Skin:     General: Skin is warm. Neurological:      Mental Status: He is oriented to person, place, and time. Mental status is at baseline. Psychiatric:         Mood and Affect: Mood normal.             Assessment       Diagnosis Orders   1. Essential hypertension     2. Type 2 diabetes mellitus with complication, without long-term current use of insulin ([facility] [address]     3. Uncontrolled type 2 DM with peripheral circulatory disorder ([facility] [address]     4. Venous insufficiency (chronic) (peripheral)     5. Obstructive sleep apnea     6. Hyperlipidemia, unspecified hyperlipidemia type     7. Cigarette smoker     8. Morbid obesity ([facility] [address]     9. Lymphedema of both lower extremities           PLAN   CIPRODEX to see if that helps with ear drainage?otitis externa. Fasting labs ordered. Start doing home BP and home Blood sugar monitoring  Continue to do the best in terms of weight loss and smoking cessation  No orders of the defined types were placed in this encounter. No follow-ups on file. Patient given educational materials - see patient instructions. Discussed use, benefit, and side effects of prescribed medications. All patient questions answered. Pt voiced understanding. Reviewed health maintenance.        Electronically signed Christina Vidales MD on 1/7/2022 at 11:04 AM EST

## 2022-01-29 DIAGNOSIS — I10 ESSENTIAL HYPERTENSION: ICD-10-CM

## 2022-01-29 DIAGNOSIS — Z87.11 HISTORY OF PEPTIC ULCER DISEASE: ICD-10-CM

## 2022-01-29 DIAGNOSIS — M26.629 TEMPOROMANDIBULAR JOINT-PAIN-DYSFUNCTION SYNDROME (TMJ): ICD-10-CM

## 2022-01-29 DIAGNOSIS — E11.8 TYPE 2 DIABETES MELLITUS WITH COMPLICATION, WITHOUT LONG-TERM CURRENT USE OF INSULIN (HCC): ICD-10-CM

## 2022-01-29 DIAGNOSIS — M19.90 ARTHRITIS: ICD-10-CM

## 2022-01-29 DIAGNOSIS — E78.2 MIXED HYPERLIPIDEMIA: ICD-10-CM

## 2022-01-29 DIAGNOSIS — F17.200 SMOKER: ICD-10-CM

## 2022-01-29 DIAGNOSIS — K21.9 GASTROESOPHAGEAL REFLUX DISEASE WITHOUT ESOPHAGITIS: ICD-10-CM

## 2022-01-29 DIAGNOSIS — E66.01 MORBID OBESITY (HCC): ICD-10-CM

## 2022-01-29 DIAGNOSIS — R21 RASH OF BACK: ICD-10-CM

## 2022-01-29 DIAGNOSIS — M16.11 PRIMARY OSTEOARTHRITIS OF RIGHT HIP: ICD-10-CM

## 2022-01-29 DIAGNOSIS — I89.0 LYMPHEDEMA OF BOTH LOWER EXTREMITIES: ICD-10-CM

## 2022-01-29 DIAGNOSIS — J42 CHRONIC BRONCHITIS, UNSPECIFIED CHRONIC BRONCHITIS TYPE (HCC): ICD-10-CM

## 2022-01-29 DIAGNOSIS — F17.200 SMOKING: ICD-10-CM

## 2022-01-29 DIAGNOSIS — M62.830 BACK MUSCLE SPASM: ICD-10-CM

## 2022-01-29 DIAGNOSIS — R11.10 DRY HEAVES: ICD-10-CM

## 2022-01-29 DIAGNOSIS — Z99.89 OSA ON CPAP: ICD-10-CM

## 2022-01-29 DIAGNOSIS — L97.921 SKIN ULCER OF LEFT LOWER LEG, LIMITED TO BREAKDOWN OF SKIN (HCC): ICD-10-CM

## 2022-01-29 DIAGNOSIS — G47.33 OSA ON CPAP: ICD-10-CM

## 2022-01-29 DIAGNOSIS — I87.2 VENOUS INSUFFICIENCY (CHRONIC) (PERIPHERAL): ICD-10-CM

## 2022-01-31 RX ORDER — FAMOTIDINE 20 MG/1
TABLET, FILM COATED ORAL
Qty: 60 TABLET | Refills: 0 | Status: SHIPPED | OUTPATIENT
Start: 2022-01-31 | End: 2022-02-21

## 2022-01-31 RX ORDER — MELOXICAM 15 MG/1
TABLET ORAL
Qty: 90 TABLET | Refills: 0 | Status: SHIPPED | OUTPATIENT
Start: 2022-01-31

## 2022-01-31 NOTE — TELEPHONE ENCOUNTER
Trace Shea is requesting a refill on the following medication(s):  Requested Prescriptions     Pending Prescriptions Disp Refills    famotidine (PEPCID) 20 MG tablet [Pharmacy Med Name: FAMOTIDINE 20MG     TAB] 60 tablet 0     Sig: Take 1 tablet by mouth twice daily    meloxicam (MOBIC) 15 MG tablet [Pharmacy Med Name: Meloxicam 15 MG Oral Tablet] 90 tablet 0     Sig: TAKE 1 TABLET BY MOUTH ONCE DAILY AS NEEDED FOR PAIN       Last Visit Date (If Applicable):  2/6/0736    Next Visit Date:    Visit date not found

## 2022-02-05 DIAGNOSIS — I87.2 VENOUS INSUFFICIENCY (CHRONIC) (PERIPHERAL): ICD-10-CM

## 2022-02-05 DIAGNOSIS — I89.0 LYMPHEDEMA OF BOTH LOWER EXTREMITIES: ICD-10-CM

## 2022-02-05 DIAGNOSIS — M19.90 ARTHRITIS: ICD-10-CM

## 2022-02-05 DIAGNOSIS — E11.8 TYPE 2 DIABETES MELLITUS WITH COMPLICATION, WITHOUT LONG-TERM CURRENT USE OF INSULIN (HCC): ICD-10-CM

## 2022-02-05 DIAGNOSIS — M62.830 BACK MUSCLE SPASM: ICD-10-CM

## 2022-02-05 DIAGNOSIS — R21 RASH OF BACK: ICD-10-CM

## 2022-02-05 DIAGNOSIS — G47.33 OSA ON CPAP: ICD-10-CM

## 2022-02-05 DIAGNOSIS — F17.200 SMOKING: ICD-10-CM

## 2022-02-05 DIAGNOSIS — J42 CHRONIC BRONCHITIS, UNSPECIFIED CHRONIC BRONCHITIS TYPE (HCC): ICD-10-CM

## 2022-02-05 DIAGNOSIS — Z99.89 OSA ON CPAP: ICD-10-CM

## 2022-02-05 DIAGNOSIS — I10 ESSENTIAL HYPERTENSION: ICD-10-CM

## 2022-02-05 DIAGNOSIS — M26.629 TEMPOROMANDIBULAR JOINT-PAIN-DYSFUNCTION SYNDROME (TMJ): ICD-10-CM

## 2022-02-05 DIAGNOSIS — M16.11 PRIMARY OSTEOARTHRITIS OF RIGHT HIP: ICD-10-CM

## 2022-02-05 DIAGNOSIS — E66.01 MORBID OBESITY (HCC): ICD-10-CM

## 2022-02-05 DIAGNOSIS — E78.2 MIXED HYPERLIPIDEMIA: ICD-10-CM

## 2022-02-07 DIAGNOSIS — I87.2 VENOUS INSUFFICIENCY (CHRONIC) (PERIPHERAL): ICD-10-CM

## 2022-02-07 DIAGNOSIS — Z99.89 OSA ON CPAP: ICD-10-CM

## 2022-02-07 DIAGNOSIS — E11.8 TYPE 2 DIABETES MELLITUS WITH COMPLICATION, WITHOUT LONG-TERM CURRENT USE OF INSULIN (HCC): ICD-10-CM

## 2022-02-07 DIAGNOSIS — M16.11 PRIMARY OSTEOARTHRITIS OF RIGHT HIP: ICD-10-CM

## 2022-02-07 DIAGNOSIS — M19.90 ARTHRITIS: ICD-10-CM

## 2022-02-07 DIAGNOSIS — J42 CHRONIC BRONCHITIS, UNSPECIFIED CHRONIC BRONCHITIS TYPE (HCC): ICD-10-CM

## 2022-02-07 DIAGNOSIS — F17.200 SMOKING: ICD-10-CM

## 2022-02-07 DIAGNOSIS — I10 ESSENTIAL HYPERTENSION: ICD-10-CM

## 2022-02-07 DIAGNOSIS — R21 RASH OF BACK: ICD-10-CM

## 2022-02-07 DIAGNOSIS — E78.2 MIXED HYPERLIPIDEMIA: ICD-10-CM

## 2022-02-07 DIAGNOSIS — M26.629 TEMPOROMANDIBULAR JOINT-PAIN-DYSFUNCTION SYNDROME (TMJ): ICD-10-CM

## 2022-02-07 DIAGNOSIS — G47.33 OSA ON CPAP: ICD-10-CM

## 2022-02-07 DIAGNOSIS — M62.830 BACK MUSCLE SPASM: ICD-10-CM

## 2022-02-07 DIAGNOSIS — E66.01 MORBID OBESITY (HCC): ICD-10-CM

## 2022-02-07 DIAGNOSIS — I89.0 LYMPHEDEMA OF BOTH LOWER EXTREMITIES: ICD-10-CM

## 2022-02-07 RX ORDER — CYCLOBENZAPRINE HCL 10 MG
TABLET ORAL
Qty: 90 TABLET | Refills: 0 | Status: SHIPPED | OUTPATIENT
Start: 2022-02-07 | End: 2022-04-01

## 2022-02-07 RX ORDER — CYCLOBENZAPRINE HCL 10 MG
TABLET ORAL
Qty: 90 TABLET | Refills: 0 | OUTPATIENT
Start: 2022-02-07

## 2022-02-07 NOTE — TELEPHONE ENCOUNTER
Hannah Morgan is requesting a refill on the following medication(s):  Requested Prescriptions     Pending Prescriptions Disp Refills    cyclobenzaprine (FLEXERIL) 10 MG tablet [Pharmacy Med Name: Cyclobenzaprine HCl 10 MG Oral Tablet] 90 tablet 0     Sig: Take 1 tablet by mouth three times daily as needed for muscle spasm       Last Visit Date (If Applicable):  1/8/7390    Next Visit Date:    2/7/2022

## 2022-02-07 NOTE — TELEPHONE ENCOUNTER
Cornel Delgado is requesting a refill on the following medication(s):  Requested Prescriptions     Pending Prescriptions Disp Refills    cyclobenzaprine (FLEXERIL) 10 MG tablet 90 tablet 0     Sig: Take 1 tablet by mouth three times daily as needed for muscle spasm       Last Visit Date (If Applicable):  1/4/3429    Next Visit Date:    Visit date not found

## 2022-02-20 DIAGNOSIS — I87.2 VENOUS INSUFFICIENCY (CHRONIC) (PERIPHERAL): ICD-10-CM

## 2022-02-20 DIAGNOSIS — L97.921 SKIN ULCER OF LEFT LOWER LEG, LIMITED TO BREAKDOWN OF SKIN (HCC): ICD-10-CM

## 2022-02-20 DIAGNOSIS — Z87.11 HISTORY OF PEPTIC ULCER DISEASE: ICD-10-CM

## 2022-02-20 DIAGNOSIS — I89.0 LYMPHEDEMA OF BOTH LOWER EXTREMITIES: ICD-10-CM

## 2022-02-20 DIAGNOSIS — F17.200 SMOKER: ICD-10-CM

## 2022-02-20 DIAGNOSIS — J42 CHRONIC BRONCHITIS, UNSPECIFIED CHRONIC BRONCHITIS TYPE (HCC): ICD-10-CM

## 2022-02-20 DIAGNOSIS — E66.01 MORBID OBESITY (HCC): ICD-10-CM

## 2022-02-20 DIAGNOSIS — K21.9 GASTROESOPHAGEAL REFLUX DISEASE WITHOUT ESOPHAGITIS: ICD-10-CM

## 2022-02-20 DIAGNOSIS — R11.10 DRY HEAVES: ICD-10-CM

## 2022-02-21 RX ORDER — FAMOTIDINE 20 MG/1
TABLET, FILM COATED ORAL
Qty: 60 TABLET | Refills: 0 | Status: SHIPPED | OUTPATIENT
Start: 2022-02-21 | End: 2022-04-04

## 2022-02-21 RX ORDER — OMEPRAZOLE 40 MG/1
CAPSULE, DELAYED RELEASE ORAL
Qty: 90 CAPSULE | Refills: 0 | Status: SHIPPED | OUTPATIENT
Start: 2022-02-21 | End: 2022-06-20

## 2022-02-21 NOTE — TELEPHONE ENCOUNTER
Prem Quintanilla is requesting a refill on the following medication(s):  Requested Prescriptions     Pending Prescriptions Disp Refills    omeprazole (PRILOSEC) 40 MG delayed release capsule [Pharmacy Med Name: Omeprazole 40 MG Oral Capsule Delayed Release] 90 capsule 0     Sig: TAKE 1 CAPSULE BY MOUTH ONCE DAILY IN THE MORNING BEFORE BREAKFAST    famotidine (PEPCID) 20 MG tablet [Pharmacy Med Name: Famotidine 20 MG Oral Tablet] 60 tablet 0     Sig: Take 1 tablet by mouth twice daily       Last Visit Date (If Applicable):  9/87/6554    Next Visit Date:    Visit date not found

## 2022-03-17 ENCOUNTER — OFFICE VISIT (OUTPATIENT)
Dept: FAMILY MEDICINE CLINIC | Age: 57
End: 2022-03-17
Payer: MEDICARE

## 2022-03-17 VITALS
DIASTOLIC BLOOD PRESSURE: 70 MMHG | RESPIRATION RATE: 20 BRPM | OXYGEN SATURATION: 97 % | WEIGHT: 315 LBS | HEART RATE: 96 BPM | HEIGHT: 72 IN | BODY MASS INDEX: 42.66 KG/M2 | SYSTOLIC BLOOD PRESSURE: 118 MMHG

## 2022-03-17 DIAGNOSIS — E78.5 HYPERLIPIDEMIA, UNSPECIFIED HYPERLIPIDEMIA TYPE: ICD-10-CM

## 2022-03-17 DIAGNOSIS — E66.01 MORBID OBESITY (HCC): ICD-10-CM

## 2022-03-17 DIAGNOSIS — I10 ESSENTIAL HYPERTENSION: ICD-10-CM

## 2022-03-17 LAB — HBA1C MFR BLD: 13.1 %

## 2022-03-17 PROCEDURE — 99213 OFFICE O/P EST LOW 20 MIN: CPT | Performed by: NURSE PRACTITIONER

## 2022-03-17 PROCEDURE — PBSHW POCT GLYCOSYLATED HEMOGLOBIN (HGB A1C): Performed by: NURSE PRACTITIONER

## 2022-03-17 PROCEDURE — 83036 HEMOGLOBIN GLYCOSYLATED A1C: CPT | Performed by: NURSE PRACTITIONER

## 2022-03-17 PROCEDURE — 3046F HEMOGLOBIN A1C LEVEL >9.0%: CPT | Performed by: NURSE PRACTITIONER

## 2022-03-17 RX ORDER — BLOOD-GLUCOSE METER
EACH MISCELLANEOUS
COMMUNITY
Start: 2022-03-07

## 2022-03-17 RX ORDER — INSULIN GLARGINE 100 [IU]/ML
INJECTION, SOLUTION SUBCUTANEOUS
COMMUNITY
Start: 2022-03-07 | End: 2022-03-17

## 2022-03-17 RX ORDER — INSULIN GLARGINE 100 [IU]/ML
INJECTION, SOLUTION SUBCUTANEOUS
COMMUNITY
Start: 2022-03-07 | End: 2022-03-28 | Stop reason: SDUPTHER

## 2022-03-17 RX ORDER — INSULIN ASPART 100 [IU]/ML
INJECTION, SOLUTION INTRAVENOUS; SUBCUTANEOUS
COMMUNITY
Start: 2022-03-07 | End: 2022-03-28 | Stop reason: SDUPTHER

## 2022-03-17 RX ORDER — PEN NEEDLE, DIABETIC 32GX 5/32"
NEEDLE, DISPOSABLE MISCELLANEOUS
COMMUNITY
Start: 2022-03-07

## 2022-03-17 RX ORDER — OXYCODONE HYDROCHLORIDE AND ACETAMINOPHEN 5; 325 MG/1; MG/1
TABLET ORAL
COMMUNITY
Start: 2022-02-11

## 2022-03-17 SDOH — ECONOMIC STABILITY: FOOD INSECURITY: WITHIN THE PAST 12 MONTHS, THE FOOD YOU BOUGHT JUST DIDN'T LAST AND YOU DIDN'T HAVE MONEY TO GET MORE.: NEVER TRUE

## 2022-03-17 SDOH — ECONOMIC STABILITY: FOOD INSECURITY: WITHIN THE PAST 12 MONTHS, YOU WORRIED THAT YOUR FOOD WOULD RUN OUT BEFORE YOU GOT MONEY TO BUY MORE.: NEVER TRUE

## 2022-03-17 ASSESSMENT — SOCIAL DETERMINANTS OF HEALTH (SDOH): HOW HARD IS IT FOR YOU TO PAY FOR THE VERY BASICS LIKE FOOD, HOUSING, MEDICAL CARE, AND HEATING?: SOMEWHAT HARD

## 2022-03-17 ASSESSMENT — PATIENT HEALTH QUESTIONNAIRE - PHQ9
SUM OF ALL RESPONSES TO PHQ9 QUESTIONS 1 & 2: 0
SUM OF ALL RESPONSES TO PHQ QUESTIONS 1-9: 0
2. FEELING DOWN, DEPRESSED OR HOPELESS: 0
1. LITTLE INTEREST OR PLEASURE IN DOING THINGS: 0
SUM OF ALL RESPONSES TO PHQ QUESTIONS 1-9: 0

## 2022-03-17 ASSESSMENT — ENCOUNTER SYMPTOMS
SHORTNESS OF BREATH: 0
COUGH: 0
ABDOMINAL PAIN: 0
WHEEZING: 0

## 2022-03-17 NOTE — PROGRESS NOTES
[address] [address] [place] [address]  Dept: [phone]  Dept Fax: [phone]  Loc: [phone]     Visit Date:  3/17/2022    Patient:  Martine Damon  YOB: 1965    HPI:     Chief Complaint   Patient presents with    Diabetes     Lat HGB A1C was 6/14/21= 7.0, Hypertension, Hyperlipidemia- last lipids 8/7/20, Lymphedema, TEOFILO, Venous insufficiency. Labs ordered 1/7/22 not done. ER f/u - patient in ER 3/7/22 for Nausea/ Vomiting due to hyperglycemia. Insulin was started- sx subsided. Here for close f/u on incontrolled DM2 after new start insulin in ER    I agree to see patient as new to me in order to help transition to new provider , accompanied by wife  Wife states patient weight down over 100# with effort    HPI  Per wife historian, patient was seen in ER 7-10 days ago with nausea and vommiting and sugars over 500 , patient was started on insulin basal 60 units am and 30 HS,     Patient has tolerated long and short acting insuline fine, wife checking BS AC and HS, brings BS log 300-450 , they were told to stop metformin with new start insulin,     Vomiting resolved and patient has no complaints today   Medications  Prior to Visit Medications    Medication Sig Taking? Authorizing Provider   BASAGLAR KWIKPEN 100 UNIT/ML injection pen INJECT SUBCUTANEOUSLY 60 UNITS IN THE MORNING AND 30 UNITS AT BEDTIME Yes Historical Provider, MD   NOVOLOG FLEXPEN 100 UNIT/ML injection pen INJECT UNITS SUBCUTANEOUSLY BEFORE MEAL(S) AND AT BEDTIME, IF SUGAR LESS THAN 175 INJECT O UNITS, 175-250 2 UNITS, 251-300 4 UNITS, 301-350 6 UNITS, -400 8 UNITS.  Yes Historical Provider, MD   RELION PEN NEEDLES 31G X 6 MM MISC USE AS DIRECTED Yes Historical Provider, MD   oxyCODONE-acetaminophen (PERCOCET) 5-325 MG per tablet TAKE 1 TABLET BY MOUTH EVERY 8 HOURS AS NEEDED FOR PAIN FOR 3 DAYS Yes Historical Provider, MD   Blood Glucose Monitoring Suppl (ONE TOUCH ULTRA 2) w/Device KIT USE AS DIRECTED Yes Historical Provider, MD   omeprazole (PRILOSEC) 40 MG delayed release capsule TAKE 1 CAPSULE BY MOUTH ONCE DAILY IN THE MORNING BEFORE BREAKFAST Yes Mariluz Galvan MD   famotidine (PEPCID) 20 MG tablet Take 1 tablet by mouth twice daily Yes Mariluz Galvan MD   cyclobenzaprine (FLEXERIL) 10 MG tablet Take 1 tablet by mouth three times daily as needed for muscle spasm Yes Mariluz Galvan MD   lisinopril (PRINIVIL;ZESTRIL) 20 MG tablet Take 1 tablet by mouth daily Yes Mariluz Galvan MD   atorvastatin (LIPITOR) 40 MG tablet Take 1 tablet by mouth daily Yes Mariluz Galvan MD   blood glucose monitor kit and supplies Dispense sufficient amount for indicated testing frequency. Dispense all needed supplies to include: monitor, strips, lancing device, lancets, control solutions, alcohol swabs. Yes Mariluz Galvan MD   Lancets MISC 1 each by Does not apply route 2 times daily Yes Mariluz Galvan MD   blood glucose monitor strips Test 2 times a day & as needed for symptoms of irregular blood glucose. Dispense sufficient amount for indicated testing frequency. Yes Mariluz Galvan MD   SILVER SULFADIAZINE EX silver sulfADIAZINE Silver Sulfadiazine Active 1 APPLICATIO TP TWICE A DAY 25 March 30th, 2021 10:11am 03-  [facility] ([id number]) Yes Historical Provider, MD   NYSTATIN [id number] UNIT/GM powder  Yes Historical Provider, MD   potassium chloride (KLOR-CON M) 20 MEQ TBCR extended release tablet  Yes Historical Provider, MD   furosemide (LASIX) 40 MG tablet TAKE 1 TABLET BY MOUTH TWICE DAILY Yes Historical Provider, MD   Multiple Minerals-Vitamins (CALCIUM-MAGNESIUM-ZINC-D3) TABS Take by mouth Yes Historical Provider, MD   buPROPion (WELLBUTRIN SR) 150 MG extended release tablet Take 1 tablet by mouth daily Yes Mariluz Galvan MD   triamcinolone (ARISTOCORT) 0.5 % cream Apply topically 3 times daily.  Yes Carson Madeline Xie MD   aspirin 81 MG tablet Take 81 mg by mouth daily Yes Historical Provider, MD   docusate sodium (COLACE) 100 MG capsule TAKE ONE CAPSULE BY MOUTH TWICE DAILY Yes Historical Provider, MD   mupirocin (BACTROBAN) 2 % ointment APPLY A SMALL AMOUNT TO EACH NOSTRIL TWICE DAILY STARTING 5 DAYS PRIOR TO SURGERY Yes Historical Provider, MD   Acetaminophen (TYLENOL ARTHRITIS PAIN PO) Take 1 tablet by mouth as needed Yes Historical Provider, MD   Multiple Vitamin (MULTI-VITAMIN DAILY) TABS Take by mouth Yes Historical Provider, MD   CPAP Machine MISC by Does not apply route Yes Historical Provider, MD   meloxicam (MOBIC) 15 MG tablet TAKE 1 TABLET BY MOUTH ONCE DAILY AS NEEDED FOR PAIN  Patient not taking: Reported on 3/17/2022  Naima Horowitz MD   metFORMIN (GLUCOPHAGE) 1000 MG tablet Take 1 tablet by mouth 2 times daily (with meals)  Patient not taking: Reported on 3/17/2022  Naima Horowitz MD   Handicap Placard MISC by Does not apply route  Historical Provider, MD        The patient is allergic to other and pneumococcal polysaccharide vaccine. Past Medical History  Liz Rojo  has a past medical history of Hyperlipidemia, Hypertriglyceridemia, Obesity, and Tobacco abuse. Past Surgical History  The patient  has a past surgical history that includes Abscess Drainage (08/2014); Total hip arthroplasty (Right, 12/2018); and Revision total hip arthroplasty (12/2019). Family History  This patient's family history includes Cancer in his father; Diabetes in his mother; Other in his brother. Social History  Chas  reports that he has been smoking cigarettes. He has been smoking about 0.37 packs per day. He has never used smokeless tobacco. He reports previous drug use. He reports that he does not drink alcohol.     Health Maintenance:    Health Maintenance   Topic Date Due    COVID-19 Vaccine (1) Never done    Hepatitis B vaccine (1 of 3 - Risk 3-dose series) Never done    Colorectal Cancer Screen  Never done    Shingles Vaccine (1 of 2) Never done    DTaP/Tdap/Td vaccine (2 - Td or Tdap) 07/13/2017    Annual Wellness Visit (AWV)  Never done    Diabetic microalbuminuria test  07/29/2020    Lipid screen  08/07/2021    Flu vaccine (1) 09/01/2021    Diabetic retinal exam  03/18/2022    Depression Screen  04/15/2022    HIV screen  10/17/2027 (Originally 6/29/1980)    Potassium monitoring  04/15/2022    Creatinine monitoring  04/15/2022    A1C test (Diabetic or Prediabetic)  06/17/2022    Diabetic foot exam  12/08/2022    Hepatitis C screen  Completed    Hepatitis A vaccine  Aged Out    Hib vaccine  Aged Out    Meningococcal (ACWY) vaccine  Aged Out       Subjective:      Review of Systems   Constitutional: Negative for chills, fatigue and fever. HENT: Negative for congestion. Respiratory: Negative for cough, shortness of breath and wheezing. Cardiovascular: Negative for chest pain, palpitations and leg swelling. Gastrointestinal: Negative for abdominal pain. Genitourinary: Negative for difficulty urinating. Musculoskeletal: Negative for arthralgias. Neurological: Negative for dizziness. Psychiatric/Behavioral: Negative for agitation. Objective:     /70 (Site: Right Lower Arm, Position: Sitting, Cuff Size: Large Adult)   Pulse 96   Resp 20   Ht 5' 11.5\" (1.816 m)   Wt (!) 437 lb 12.8 oz (198.6 kg)   SpO2 97%   BMI 60.21 kg/m²     Physical Exam  Vitals and nursing note reviewed. Constitutional:       Appearance: Normal appearance. HENT:      Head: Normocephalic. Cardiovascular:      Rate and Rhythm: Normal rate and regular rhythm. Pulses: Normal pulses. Heart sounds: Normal heart sounds, S1 normal and S2 normal. No murmur heard. Pulmonary:      Effort: Pulmonary effort is normal.      Breath sounds: Normal breath sounds. Abdominal:      General: Bowel sounds are normal. There is no distension. Palpations: Abdomen is soft. There is no mass. Tenderness: There is no abdominal tenderness. There is no guarding. Musculoskeletal:      Right lower leg: No edema. Left lower leg: No edema. Skin:     General: Skin is warm. Capillary Refill: Capillary refill takes less than 2 seconds. Neurological:      General: No focal deficit present. Mental Status: He is alert. Psychiatric:         Attention and Perception: Attention normal.         Mood and Affect: Mood normal.         Behavior: Behavior normal. Behavior is cooperative. Thought Content: Thought content normal.         Judgment: Judgment normal.         Labs Reviewed 3/17/2022:    Lab Results   Component Value Date    WBC 7.4 08/07/2020    HGB 13.3 08/07/2020    HCT 41.7 08/07/2020     08/07/2020    CHOL 128 08/07/2020    TRIG 124 08/07/2020    HDL 36 (L) 08/07/2020    ALT 30 08/07/2020    AST 26 08/07/2020     04/15/2021    K 92 04/15/2021    CL 4.5 04/15/2021    CREATININE 0.9 04/15/2021    BUN 21 04/15/2021    CO2 30 04/15/2021    TSH 3.97 08/07/2020    INR 1.11 02/06/2020    LABA1C 13.1 03/17/2022    LABMICR 0.7 07/29/2019       Assessment/Plan      1. Uncontrolled type 2 DM with peripheral circulatory disorder ([facility] [address]  I reviewed ADA diet with patient and wife and encouraged continued logging of AC and HS sugars    Advise close f/u with new PCP in 2-4week  Restart PO metformin now please with no change in insulin     - POCT Hb A1C (glycosylated hemoglobin)    2. Essential hypertension  Continue health diet and increase activity, weight loss    3. Morbid obesity ([address]      4. Hyperlipidemia, unspecified hyperlipidemia type        Return in about 4 weeks (around 4/14/2022). Patient given educational materials - see patient instructions. Discussed use, benefit, and side effects of prescribed medications. All patient questions answered. Pt voiced understanding. Reviewed health maintenance.        Electronically signed IONA English CNP on 3/17/2022 at 1:33 PM

## 2022-03-21 ENCOUNTER — TELEPHONE (OUTPATIENT)
Dept: FAMILY MEDICINE CLINIC | Age: 57
End: 2022-03-21

## 2022-03-21 NOTE — TELEPHONE ENCOUNTER
Patient already seen post hospital      ----- Message from Juan Pablo Leiva sent at 3/21/2022  1:21 PM EDT -----  Subject: Message to Provider    QUESTIONS  Information for Provider? [facility]   [facility]/22 and released on 3/7/22 please call to make pt a follow up   appt   ---------------------------------------------------------------------------  --------------  [address]  What is the best way for the office to contact you? OK to leave message on   voicemail  Preferred Call Back Phone Number? [phone]  ---------------------------------------------------------------------------  --------------  SCRIPT ANSWERS  Relationship to Patient?  Third Party  Representative Name? ins member

## 2022-03-23 ENCOUNTER — OFFICE VISIT (OUTPATIENT)
Dept: PODIATRY | Age: 57
End: 2022-03-23
Payer: MEDICARE

## 2022-03-23 VITALS
RESPIRATION RATE: 20 BRPM | WEIGHT: 315 LBS | HEART RATE: 88 BPM | DIASTOLIC BLOOD PRESSURE: 62 MMHG | BODY MASS INDEX: 58.86 KG/M2 | SYSTOLIC BLOOD PRESSURE: 118 MMHG

## 2022-03-23 DIAGNOSIS — E11.51 CONTROLLED TYPE 2 DM WITH PERIPHERAL CIRCULATORY DISORDER (HCC): Primary | ICD-10-CM

## 2022-03-23 DIAGNOSIS — I89.0 LYMPHEDEMA: ICD-10-CM

## 2022-03-23 DIAGNOSIS — B35.1 DERMATOPHYTOSIS OF NAIL: ICD-10-CM

## 2022-03-23 PROCEDURE — 99999 PR OFFICE/OUTPT VISIT,PROCEDURE ONLY: CPT | Performed by: PODIATRIST

## 2022-03-23 PROCEDURE — 11720 DEBRIDE NAIL 1-5: CPT | Performed by: PODIATRIST

## 2022-03-23 PROCEDURE — 3046F HEMOGLOBIN A1C LEVEL >9.0%: CPT | Performed by: PODIATRIST

## 2022-03-23 NOTE — PROGRESS NOTES
Subjective:  Patient presents to [facility] today for routine diabetic foot care. Patient denies any new problems with their feet. Patient's diabetic control has been not changed. Allergies   Allergen Reactions    Other Other (See Comments)    Pneumococcal Polysaccharide Vaccine Hives       Past Medical History:   Diagnosis Date    Hyperlipidemia     Hypertriglyceridemia     Obesity     Tobacco abuse        Prior to Admission medications    Medication Sig Start Date End Date Taking? Authorizing Provider   BASAGLAR KWIKPEN 100 UNIT/ML injection pen INJECT SUBCUTANEOUSLY 60 UNITS IN THE MORNING AND 30 UNITS AT BEDTIME 3/7/22  Yes Historical Provider, MD   NOVOLOG FLEXPEN 100 UNIT/ML injection pen INJECT UNITS SUBCUTANEOUSLY BEFORE MEAL(S) AND AT BEDTIME, IF SUGAR LESS THAN 175 INJECT O UNITS, 175-250 2 UNITS, 251-300 4 UNITS, 301-350 6 UNITS, -400 8 UNITS.  3/7/22  Yes Historical Provider, MD   RELION PEN NEEDLES 31G X 6 MM MISC USE AS DIRECTED 3/7/22  Yes Historical Provider, MD   oxyCODONE-acetaminophen (PERCOCET) 5-325 MG per tablet TAKE 1 TABLET BY MOUTH EVERY 8 HOURS AS NEEDED FOR PAIN FOR 3 DAYS 2/11/22  Yes Historical Provider, MD   Blood Glucose Monitoring Suppl (ONE TOUCH ULTRA 2) w/Device KIT USE AS DIRECTED 3/7/22  Yes Historical Provider, MD   omeprazole (PRILOSEC) 40 MG delayed release capsule TAKE 1 CAPSULE BY MOUTH ONCE DAILY IN THE MORNING BEFORE BREAKFAST 2/21/22  Yes Leatha Rdoriguez MD   famotidine (PEPCID) 20 MG tablet Take 1 tablet by mouth twice daily 2/21/22  Yes Leatha Rodriguez MD   cyclobenzaprine (FLEXERIL) 10 MG tablet Take 1 tablet by mouth three times daily as needed for muscle spasm 2/7/22  Yes Leatha Rodriguez MD   meloxicam (MOBIC) 15 MG tablet TAKE 1 TABLET BY MOUTH ONCE DAILY AS NEEDED FOR PAIN 1/31/22  Yes Leatha Rodriguez MD   lisinopril (PRINIVIL;ZESTRIL) 20 MG tablet Take 1 tablet by mouth daily 5/14/21  Yes Leatha Rodriguez MD   atorvastatin (LIPITOR) 40 MG tablet Take 1 tablet by mouth daily 5/14/21  Yes Amairani Paredes MD   metFORMIN (GLUCOPHAGE) 1000 MG tablet Take 1 tablet by mouth 2 times daily (with meals) 5/14/21  Yes Amairani Paredes MD   blood glucose monitor kit and supplies Dispense sufficient amount for indicated testing frequency. Dispense all needed supplies to include: monitor, strips, lancing device, lancets, control solutions, alcohol swabs. 4/23/21  Yes Amairani Paredes MD   Lancets MISC 1 each by Does not apply route 2 times daily 4/23/21  Yes Amairani Paredes MD   blood glucose monitor strips Test 2 times a day & as needed for symptoms of irregular blood glucose. Dispense sufficient amount for indicated testing frequency. 4/23/21  Yes Amairani Paredes MD   SILVER SULFADIAZINE EX silver sulfADIAZINE Silver Sulfadiazine Active 1 APPLICATIO TP TWICE A DAY 25 March 30th, 2021 10:11am 03-  [facility] ([id number]) 3/30/21  Yes Historical Provider, MD   NYSTATIN [id number] UNIT/GM powder  3/30/21  Yes Historical Provider, MD   potassium chloride (KLOR-CON M) 20 MEQ TBCR extended release tablet  4/10/21  Yes Historical Provider, MD   furosemide (LASIX) 40 MG tablet TAKE 1 TABLET BY MOUTH TWICE DAILY 4/9/21  Yes Historical Provider, MD   Multiple Minerals-Vitamins (CALCIUM-MAGNESIUM-ZINC-D3) TABS Take by mouth   Yes Historical Provider, MD   buPROPion [facility] SR) 150 MG extended release tablet Take 1 tablet by mouth daily 7/16/20  Yes Amairani Paredes MD   triamcinolone (ARISTOCORT) 0.5 % cream Apply topically 3 times daily.  7/16/20  Yes Amairani Paredes MD   aspirin 81 MG tablet Take 81 mg by mouth daily   Yes Historical Provider, MD   docusate sodium (COLACE) 100 MG capsule TAKE ONE CAPSULE BY MOUTH TWICE DAILY 12/17/19  Yes Historical Provider, MD   mupirocin (BACTROBAN) 2 % ointment APPLY A SMALL AMOUNT TO EACH NOSTRIL TWICE DAILY STARTING 5 DAYS PRIOR TO SURGERY 12/4/19  Yes Historical Provider, MD   Acetaminophen (TYLENOL ARTHRITIS PAIN PO) Take 1 tablet by mouth as needed   Yes Historical Provider, MD   Multiple Vitamin (MULTI-VITAMIN DAILY) TABS Take by mouth   Yes Historical Provider, MD   Handicap Placard MISC by Does not apply route   Yes Historical Provider, MD   CPAP Machine MISC by Does not apply route   Yes Historical Provider, MD       Social History     Tobacco Use    Smoking status: Current Every Day Smoker     Packs/day: 0.37     Types: Cigarettes    Smokeless tobacco: Never Used    Tobacco comment: Patient trying to cut down. Substance Use Topics    Alcohol use: No     ROS: All 14 ROS systems reviewed and pertinent positives noted above, all others negative. Objective:  Vascular: DP and PT pulses palpable 2/4, bilateral.  CFT <3 seconds, bilateral.  Hair growth absent to the level of the digits, bilateral.  Edema present, bilateral.  Varicosities present, bilateral. Erythema absent, bilateral. Distal Rubor absent bilateral.  Temperature decreased bilateral. Hyperpigmentation present bilateral. Atrophic skin yes. Neurological: Sensation intact to light touch to level of digits, bilateral.  Protective sensation intact 10/10 sites via 5.07/10g [place] Monofilament, bilateral.  negative Tinel's, bilateral.  negative Valleix sign, bilateral.  Vibratory intact bilateral.  Reflexes Decreased bilateral.  Paresthesias negative. Dysthesias negative. Sharp/dull intact bilateral.    Integument:  Open lesion absent, Bilateral.  Interdigital maceration absent to web spaces,absent Bilateral.  Nails left 5 and right 1, 5 thickened, dystrophic and crumbly, discolored with subungual debris. Fissures absent, Bilateral. Hyperkeratotic tissue is absent. Assessment:    Diagnosis Orders   1. Controlled type 2 DM with peripheral circulatory disorder ([facility] [address]     2. Dermatophytosis of nail     3. Lymphedema         Plan:  Diabetic foot education and exam.  Nails as mentioned above debrided in length and thickness.   Patient advised about OTC treatments for nails and callous. Patient will follow up in 10 weeks for routine foot care or PRN if any further problems arise.

## 2022-03-23 NOTE — PROGRESS NOTES
Foot Care Worksheet  PCP: IONA Kline - CNP  Last visit: 03 / 17 / 2022    Nail description:  Thick , Yellow , Crumbly , Marked limitation of ambulation     Pain resulting from thickened and dystrophy of nail plate No    Nails involved  Right   1 5 (T5-T9)  Left     5  (TA-T4)    Q7 1 Class A Finding - Non traumatic amputation of foot No    Q8 2 Class B Findings - Absent DP pulse No, Absent PT pulse No, Advanced tropic changes (3 required) No    Decrease hair growth Yes, Nail changes/thickening Yes, Pigmented changes/discoloration Yes, Skin texture (thin, shiny) Yes, Skin color (rubor/redness) No    Q9 1 Class B and 2 Class C Findings  Claudication No, Temperature change Yes, Paresthesia No, Burning No, Edema Yes

## 2022-03-28 RX ORDER — INSULIN ASPART 100 [IU]/ML
INJECTION, SOLUTION INTRAVENOUS; SUBCUTANEOUS
Qty: 5 PEN | Refills: 1 | Status: SHIPPED | OUTPATIENT
Start: 2022-03-28

## 2022-03-28 RX ORDER — INSULIN GLARGINE 100 [IU]/ML
INJECTION, SOLUTION SUBCUTANEOUS
Qty: 5 PEN | Refills: 1 | Status: SHIPPED | OUTPATIENT
Start: 2022-03-28 | End: 2022-04-14

## 2022-03-28 NOTE — TELEPHONE ENCOUNTER
Flaco Spicer is requesting a refill on the following medication(s):  Requested Prescriptions     Pending Prescriptions Disp Refills    BASAGLAR KWIKPEN 100 UNIT/ML injection pen 5 pen 1     Sig: INJECT SUBCUTANEOUSLY 60 UNITS IN THE MORNING AND 30 UNITS AT BEDTIME    NOVOLOG FLEXPEN 100 UNIT/ML injection pen 5 pen 1     Sig: INJECT UNITS SUBCUTANEOUSLY BEFORE MEAL(S) AND AT BEDTIME, IF SUGAR LESS THAN 175 INJECT O UNITS, 175-250 2 UNITS, 251-300 4 UNITS, 301-350 6 UNITS, -400 8 UNITS.        Last Visit Date (If Applicable):  1/33/4416    Next Visit Date:    4/14/2022

## 2022-03-31 DIAGNOSIS — I10 ESSENTIAL HYPERTENSION: ICD-10-CM

## 2022-03-31 DIAGNOSIS — J42 CHRONIC BRONCHITIS, UNSPECIFIED CHRONIC BRONCHITIS TYPE (HCC): ICD-10-CM

## 2022-03-31 DIAGNOSIS — I89.0 LYMPHEDEMA OF BOTH LOWER EXTREMITIES: ICD-10-CM

## 2022-03-31 DIAGNOSIS — M62.830 BACK MUSCLE SPASM: ICD-10-CM

## 2022-03-31 DIAGNOSIS — E78.2 MIXED HYPERLIPIDEMIA: ICD-10-CM

## 2022-03-31 DIAGNOSIS — R21 RASH OF BACK: ICD-10-CM

## 2022-03-31 DIAGNOSIS — M16.11 PRIMARY OSTEOARTHRITIS OF RIGHT HIP: ICD-10-CM

## 2022-03-31 DIAGNOSIS — G47.33 OSA ON CPAP: ICD-10-CM

## 2022-03-31 DIAGNOSIS — E66.01 MORBID OBESITY (HCC): ICD-10-CM

## 2022-03-31 DIAGNOSIS — E11.8 TYPE 2 DIABETES MELLITUS WITH COMPLICATION, WITHOUT LONG-TERM CURRENT USE OF INSULIN (HCC): ICD-10-CM

## 2022-03-31 DIAGNOSIS — Z99.89 OSA ON CPAP: ICD-10-CM

## 2022-03-31 DIAGNOSIS — F17.200 SMOKING: ICD-10-CM

## 2022-03-31 DIAGNOSIS — M26.629 TEMPOROMANDIBULAR JOINT-PAIN-DYSFUNCTION SYNDROME (TMJ): ICD-10-CM

## 2022-03-31 DIAGNOSIS — I87.2 VENOUS INSUFFICIENCY (CHRONIC) (PERIPHERAL): ICD-10-CM

## 2022-03-31 DIAGNOSIS — M19.90 ARTHRITIS: ICD-10-CM

## 2022-03-31 NOTE — TELEPHONE ENCOUNTER
Мария Galloway is requesting a refill on the following medication(s):  Requested Prescriptions     Pending Prescriptions Disp Refills    cyclobenzaprine (FLEXERIL) 10 MG tablet [Pharmacy Med Name: Cyclobenzaprine HCl 10 MG Oral Tablet] 90 tablet 0     Sig: Take 1 tablet by mouth three times daily as needed for muscle spasm       Last Visit Date (If Applicable):  1/15/9561    Next Visit Date:    4/14/2022

## 2022-04-01 RX ORDER — CYCLOBENZAPRINE HCL 10 MG
TABLET ORAL
Qty: 30 TABLET | Refills: 0 | Status: SHIPPED | OUTPATIENT
Start: 2022-04-01 | End: 2022-04-18

## 2022-04-04 DIAGNOSIS — K21.9 GASTROESOPHAGEAL REFLUX DISEASE WITHOUT ESOPHAGITIS: ICD-10-CM

## 2022-04-04 DIAGNOSIS — I89.0 LYMPHEDEMA OF BOTH LOWER EXTREMITIES: ICD-10-CM

## 2022-04-04 DIAGNOSIS — L97.921 SKIN ULCER OF LEFT LOWER LEG, LIMITED TO BREAKDOWN OF SKIN (HCC): ICD-10-CM

## 2022-04-04 DIAGNOSIS — I87.2 VENOUS INSUFFICIENCY (CHRONIC) (PERIPHERAL): ICD-10-CM

## 2022-04-04 DIAGNOSIS — J42 CHRONIC BRONCHITIS, UNSPECIFIED CHRONIC BRONCHITIS TYPE (HCC): ICD-10-CM

## 2022-04-04 DIAGNOSIS — R11.10 DRY HEAVES: ICD-10-CM

## 2022-04-04 DIAGNOSIS — E66.01 MORBID OBESITY (HCC): ICD-10-CM

## 2022-04-04 DIAGNOSIS — Z87.11 HISTORY OF PEPTIC ULCER DISEASE: ICD-10-CM

## 2022-04-04 DIAGNOSIS — F17.200 SMOKER: ICD-10-CM

## 2022-04-04 RX ORDER — FAMOTIDINE 20 MG/1
TABLET, FILM COATED ORAL
Qty: 60 TABLET | Refills: 0 | Status: SHIPPED | OUTPATIENT
Start: 2022-04-04 | End: 2022-05-10

## 2022-04-14 ENCOUNTER — OFFICE VISIT (OUTPATIENT)
Dept: FAMILY MEDICINE CLINIC | Age: 57
End: 2022-04-14
Payer: MEDICARE

## 2022-04-14 VITALS
HEART RATE: 102 BPM | BODY MASS INDEX: 59.41 KG/M2 | SYSTOLIC BLOOD PRESSURE: 126 MMHG | OXYGEN SATURATION: 95 % | WEIGHT: 315 LBS | DIASTOLIC BLOOD PRESSURE: 78 MMHG

## 2022-04-14 DIAGNOSIS — I10 ESSENTIAL HYPERTENSION: ICD-10-CM

## 2022-04-14 DIAGNOSIS — L97.921 SKIN ULCER OF LEFT LOWER LEG, LIMITED TO BREAKDOWN OF SKIN (HCC): ICD-10-CM

## 2022-04-14 DIAGNOSIS — J42 CHRONIC BRONCHITIS, UNSPECIFIED CHRONIC BRONCHITIS TYPE (HCC): ICD-10-CM

## 2022-04-14 PROBLEM — E78.00 HYPERCHOLESTEROLEMIA: Status: ACTIVE | Noted: 2017-10-05

## 2022-04-14 PROBLEM — B35.1 ONYCHOMYCOSIS: Status: ACTIVE | Noted: 2022-04-14

## 2022-04-14 PROBLEM — I89.0 LYMPHEDEMA OF BOTH LOWER EXTREMITIES: Status: ACTIVE | Noted: 2022-04-14

## 2022-04-14 PROBLEM — E11.9 DIABETES MELLITUS (HCC): Status: ACTIVE | Noted: 2018-10-28

## 2022-04-14 PROBLEM — L03.119 CELLULITIS OF LOWER EXTREMITY: Status: ACTIVE | Noted: 2021-04-12

## 2022-04-14 PROCEDURE — 3046F HEMOGLOBIN A1C LEVEL >9.0%: CPT

## 2022-04-14 PROCEDURE — 99213 OFFICE O/P EST LOW 20 MIN: CPT

## 2022-04-14 PROCEDURE — 99212 OFFICE O/P EST SF 10 MIN: CPT

## 2022-04-14 RX ORDER — INSULIN GLARGINE 100 [IU]/ML
INJECTION, SOLUTION SUBCUTANEOUS
Qty: 5 PEN | Refills: 1
Start: 2022-04-14 | End: 2022-05-06 | Stop reason: SDUPTHER

## 2022-04-14 ASSESSMENT — ENCOUNTER SYMPTOMS
RHINORRHEA: 0
BACK PAIN: 0
CHEST TIGHTNESS: 0
EYE DISCHARGE: 0
COUGH: 0
COLOR CHANGE: 0
SINUS PAIN: 0
SINUS PRESSURE: 0
NAUSEA: 0
WHEEZING: 0
ABDOMINAL PAIN: 0
EYE ITCHING: 0
DIARRHEA: 0
BLURRED VISION: 1
SHORTNESS OF BREATH: 0
CONSTIPATION: 0

## 2022-04-14 NOTE — ASSESSMENT & PLAN NOTE
At goal, continue current medications and continue current treatment plan Small, pea in size, anterior left mid tib.

## 2022-04-14 NOTE — PROGRESS NOTES
Mg West (:  1965) is a 64 y.o. male,Established patient, here for evaluation of the following chief complaint(s):  Established New Doctor and Diabetes (patient is here today to establish and for a diabetic follow up. His sugars are uncontrolled and he did bring a log in today.)         ASSESSMENT/PLAN:  1. Uncontrolled type 2 DM with peripheral circulatory disorder (HCC)  -     BASAGLAR KWIKPEN 100 UNIT/ML injection pen; INJECT SUBCUTANEOUSLY 65 UNITS IN THE MORNING AND 25 UNITS AT BEDTIME, Disp-5 pen, R-1, DAWAdjust Sig   -increase long basaglar to 65 units in AM, and decrease to 25 units in evening in hope of better coverage during day, and no hypoglycemia in am. See back in month for further titration, Hypoglycemia signs and symptoms reviewed. 2. Skin ulcer of left lower leg, limited to breakdown of skin ([address]  Assessment & Plan:   At goal, continue current medications and continue current treatment plan Small, pea in size, anterior left mid tib. 3. Chronic bronchitis, unspecified chronic bronchitis type ([address]  Assessment & Plan:   At goal, lifestyle modifications recommended, quit smoking discussed, quitting options discussed and strongly encouraged. No follow-ups on file. Subjective   SUBJECTIVE/OBJECTIVE:  Batool Olivier is here for a recheck on his DM type II. He is currently on insulin, SS & AC and HS. His blood sugars are consistently lower in the mornings, and higher in the evenings. Diet is discussed, he is covering his meals with SS. It is decided to increase long basaglar to 65 units in AM, and decrease to 25 units in evening in hope of better coverage during day, and no hypoglycemia in am.     Diabetes  He presents for his follow-up diabetic visit. He has type 2 diabetes mellitus. No MedicAlert identification noted. His disease course has been improving. There are no hypoglycemic associated symptoms.  Pertinent negatives for hypoglycemia include no confusion, dizziness, headaches or nervousness/anxiousness. Associated symptoms include blurred vision. Pertinent negatives for diabetes include no chest pain, no fatigue, no foot ulcerations, no polydipsia, no polyphagia, no polyuria and no weakness. There are no hypoglycemic complications. Symptoms are improving. Diabetic complications include PVD. Risk factors for coronary artery disease include diabetes mellitus, dyslipidemia, family history, male sex, hypertension, sedentary lifestyle and tobacco exposure. Current diabetic treatment includes insulin injections and oral agent (monotherapy). He is compliant with treatment most of the time. His weight is stable. He is following a diabetic diet. He has had a previous visit with a dietitian. He rarely participates in exercise. His home blood glucose trend is decreasing steadily. His breakfast blood glucose is taken between 8-9 am. His breakfast blood glucose range is generally 110-130 mg/dl. His lunch blood glucose is taken between 12-1 pm. His lunch blood glucose range is generally 140-180 mg/dl. His dinner blood glucose is taken between 5-6 pm. His dinner blood glucose range is generally 180-200 mg/dl. His bedtime blood glucose is taken between 9-10 pm. His bedtime blood glucose range is generally >200 mg/dl. An ACE inhibitor/angiotensin II receptor blocker is being taken. He sees a podiatrist.Eye exam is not current (is making apt.). Review of Systems   Constitutional: Negative for chills, fatigue, fever and unexpected weight change. HENT: Negative for congestion, ear pain, rhinorrhea, sinus pressure and sinus pain. Eyes: Positive for blurred vision. Negative for discharge, itching and visual disturbance. Respiratory: Negative for cough, chest tightness, shortness of breath and wheezing. Cardiovascular: Negative for chest pain, palpitations and leg swelling. Gastrointestinal: Negative for abdominal pain, constipation, diarrhea and nausea.    Endocrine: Negative for cold intolerance, heat intolerance, polydipsia, polyphagia and polyuria. Genitourinary: Negative for dysuria, flank pain, frequency and urgency. Musculoskeletal: Negative for arthralgias, back pain and myalgias. Skin: Negative for color change. Allergic/Immunologic: Negative for environmental allergies and food allergies. Neurological: Negative for dizziness, weakness, light-headedness, numbness and headaches. Psychiatric/Behavioral: Negative for agitation, behavioral problems and confusion. The patient is not nervous/anxious. Objective   Physical Exam  Vitals and nursing note reviewed. Constitutional:       General: He is not in acute distress. Appearance: Normal appearance. He is not ill-appearing. HENT:      Head: Normocephalic and atraumatic. Right Ear: Tympanic membrane and external ear normal.      Left Ear: Tympanic membrane and external ear normal.      Nose: Nose normal. No congestion or rhinorrhea. Mouth/Throat:      Mouth: Mucous membranes are moist.      Pharynx: Oropharynx is clear. No posterior oropharyngeal erythema. Eyes:      Pupils: Pupils are equal, round, and reactive to light. Cardiovascular:      Rate and Rhythm: Normal rate and regular rhythm. Pulses: Normal pulses. Heart sounds: Normal heart sounds. Pulmonary:      Effort: Pulmonary effort is normal.      Breath sounds: Normal breath sounds. No wheezing, rhonchi or rales. Abdominal:      General: Bowel sounds are normal.      Palpations: There is no mass. Tenderness: There is no abdominal tenderness. Musculoskeletal:         General: Swelling present. No tenderness. Normal range of motion. Cervical back: Normal range of motion. No rigidity or tenderness. Right lower leg: Edema present. Left lower leg: Edema present. Skin:     General: Skin is warm and dry. Capillary Refill: Capillary refill takes less than 2 seconds. Coloration: Skin is not pale. Findings: No erythema. Neurological:      General: No focal deficit present. Mental Status: He is alert and oriented to person, place, and time. Psychiatric:         Mood and Affect: Mood normal.         Behavior: Behavior normal.         Thought Content: Thought content normal.         Judgment: Judgment normal.                  An electronic signature was used to authenticate this note.     --IONA Borges - CNP

## 2022-04-14 NOTE — ASSESSMENT & PLAN NOTE
At goal, lifestyle modifications recommended, quit smoking discussed, quitting options discussed and strongly encouraged.

## 2022-04-18 DIAGNOSIS — M16.11 PRIMARY OSTEOARTHRITIS OF RIGHT HIP: ICD-10-CM

## 2022-04-18 DIAGNOSIS — J42 CHRONIC BRONCHITIS, UNSPECIFIED CHRONIC BRONCHITIS TYPE (HCC): ICD-10-CM

## 2022-04-18 DIAGNOSIS — Z99.89 OSA ON CPAP: ICD-10-CM

## 2022-04-18 DIAGNOSIS — E11.8 TYPE 2 DIABETES MELLITUS WITH COMPLICATION, WITHOUT LONG-TERM CURRENT USE OF INSULIN (HCC): ICD-10-CM

## 2022-04-18 DIAGNOSIS — G47.33 OSA ON CPAP: ICD-10-CM

## 2022-04-18 DIAGNOSIS — I89.0 LYMPHEDEMA OF BOTH LOWER EXTREMITIES: ICD-10-CM

## 2022-04-18 DIAGNOSIS — M26.629 TEMPOROMANDIBULAR JOINT-PAIN-DYSFUNCTION SYNDROME (TMJ): ICD-10-CM

## 2022-04-18 DIAGNOSIS — M62.830 BACK MUSCLE SPASM: ICD-10-CM

## 2022-04-18 DIAGNOSIS — M19.90 ARTHRITIS: ICD-10-CM

## 2022-04-18 DIAGNOSIS — I10 ESSENTIAL HYPERTENSION: ICD-10-CM

## 2022-04-18 DIAGNOSIS — E66.01 MORBID OBESITY (HCC): ICD-10-CM

## 2022-04-18 DIAGNOSIS — F17.200 SMOKING: ICD-10-CM

## 2022-04-18 DIAGNOSIS — E78.2 MIXED HYPERLIPIDEMIA: ICD-10-CM

## 2022-04-18 DIAGNOSIS — R21 RASH OF BACK: ICD-10-CM

## 2022-04-18 DIAGNOSIS — I87.2 VENOUS INSUFFICIENCY (CHRONIC) (PERIPHERAL): ICD-10-CM

## 2022-04-18 RX ORDER — CYCLOBENZAPRINE HCL 10 MG
TABLET ORAL
Qty: 30 TABLET | Refills: 2 | Status: SHIPPED | OUTPATIENT
Start: 2022-04-18 | End: 2022-06-09

## 2022-04-18 NOTE — TELEPHONE ENCOUNTER
Johnnie Berger is requesting a refill on the following medication(s):  Requested Prescriptions     Pending Prescriptions Disp Refills    cyclobenzaprine (FLEXERIL) 10 MG tablet [Pharmacy Med Name: Cyclobenzaprine HCl 10 MG Oral Tablet] 30 tablet 0     Sig: Take 1 tablet by mouth three times daily as needed for muscle spasm       Last Visit Date (If Applicable):  8/71/3297    Next Visit Date:    5/16/2022

## 2022-04-28 ENCOUNTER — TELEPHONE (OUTPATIENT)
Dept: FAMILY MEDICINE CLINIC | Age: 57
End: 2022-04-28

## 2022-04-28 RX ORDER — BLOOD-GLUCOSE,RECEIVER,CONT
1 EACH MISCELLANEOUS DAILY
Qty: 1 EACH | Refills: 1 | Status: SHIPPED | OUTPATIENT
Start: 2022-04-28

## 2022-04-28 RX ORDER — BLOOD-GLUCOSE SENSOR
1 EACH MISCELLANEOUS DAILY
Qty: 1 EACH | Refills: 1 | Status: SHIPPED | OUTPATIENT
Start: 2022-04-28

## 2022-04-28 RX ORDER — BLOOD-GLUCOSE TRANSMITTER
1 EACH MISCELLANEOUS DAILY
Qty: 1 EACH | Refills: 1 | Status: SHIPPED | OUTPATIENT
Start: 2022-04-28

## 2022-04-28 NOTE — PROGRESS NOTES
Dexcom continuous glucose monitor and supplies ordered and sent to pharmacy. [facility] tell me his wife is called and discussed related to his recent URI-like symptoms. And increase in blood sugar. They are to monitor time his blood sugar at least 4 times a day and report any blood sugars greater than 400 to the office soon as possible if he has blood sugars that only read as high to go to the ER soon as possible. Can use Coricidin HBP or any decongestion for people with high blood pressure, avoid steroids of any kind.

## 2022-04-28 NOTE — TELEPHONE ENCOUNTER
[facility] sent fax stating that they do not carry the G$ series of dexacom but do carry the G6 series. Spoke with [facility] they did take a verbal to change it to the G6.

## 2022-05-02 ENCOUNTER — TELEPHONE (OUTPATIENT)
Dept: FAMILY MEDICINE CLINIC | Age: 57
End: 2022-05-02

## 2022-05-02 NOTE — TELEPHONE ENCOUNTER
Spoke with [facility] DR CHRISTOPHER LOPEZ and did clarify we did discuss this last week and gave them a verbal for the g6 to be filled. They did confirm there was a verbal given but states that they only called to let us know that medicare will not cover this at their pharmacy. They did let the patient know this but they wanted to know what the cash price would be even after being told it would be over $1000. The pharmacist did say that we should call the medical supply store possibly to see if we could get this covered for him. I let her know that I would call the pt to let them know I will try another facility. I did speak with tammy [facility]minia office to find out what the best option would be. Her nurse did fax over order forms for [facility] and [employer]. Will fax form to [employer] to start today.

## 2022-05-02 NOTE — TELEPHONE ENCOUNTER
They are receiving notices that he's not our patient. Dexcom G4 is not an option they have faxed about doing the Dexcom G6 please clarify. They would need new scripts for his diabetic supplies for the G6.

## 2022-05-06 RX ORDER — INSULIN GLARGINE 100 [IU]/ML
INJECTION, SOLUTION SUBCUTANEOUS
Qty: 5 PEN | Refills: 1 | Status: SHIPPED | OUTPATIENT
Start: 2022-05-06 | End: 2022-06-09

## 2022-05-06 NOTE — TELEPHONE ENCOUNTER
Requested Prescriptions     Pending Prescriptions Disp Refills    BASAGLAR KWIKPEN 100 UNIT/ML injection pen 5 pen 1     Sig: INJECT SUBCUTANEOUSLY 65 UNITS IN THE MORNING AND 25 UNITS AT BEDTIME     Pt is down to one day of his injectable please refill. He is established with Rebekah Caballero. Pt. Also had questions for Rebekah Caballero about the Dexcom 4 and Dexcom 6 switch.
17-Jul-2019 17:10

## 2022-05-10 DIAGNOSIS — R11.10 DRY HEAVES: ICD-10-CM

## 2022-05-10 DIAGNOSIS — J42 CHRONIC BRONCHITIS, UNSPECIFIED CHRONIC BRONCHITIS TYPE (HCC): ICD-10-CM

## 2022-05-10 DIAGNOSIS — I89.0 LYMPHEDEMA OF BOTH LOWER EXTREMITIES: ICD-10-CM

## 2022-05-10 DIAGNOSIS — E66.01 MORBID OBESITY (HCC): ICD-10-CM

## 2022-05-10 DIAGNOSIS — I87.2 VENOUS INSUFFICIENCY (CHRONIC) (PERIPHERAL): ICD-10-CM

## 2022-05-10 DIAGNOSIS — Z87.11 HISTORY OF PEPTIC ULCER DISEASE: ICD-10-CM

## 2022-05-10 DIAGNOSIS — F17.200 SMOKER: ICD-10-CM

## 2022-05-10 DIAGNOSIS — K21.9 GASTROESOPHAGEAL REFLUX DISEASE WITHOUT ESOPHAGITIS: ICD-10-CM

## 2022-05-10 DIAGNOSIS — L97.921 SKIN ULCER OF LEFT LOWER LEG, LIMITED TO BREAKDOWN OF SKIN (HCC): ICD-10-CM

## 2022-05-10 RX ORDER — FAMOTIDINE 20 MG/1
TABLET, FILM COATED ORAL
Qty: 60 TABLET | Refills: 0 | Status: SHIPPED | OUTPATIENT
Start: 2022-05-10 | End: 2022-06-20

## 2022-05-10 NOTE — TELEPHONE ENCOUNTER
Ismael Jefferson is requesting a refill on the following medication(s):  Requested Prescriptions     Pending Prescriptions Disp Refills    famotidine (PEPCID) 20 MG tablet [Pharmacy Med Name: Famotidine 20 MG Oral Tablet] 60 tablet 0     Sig: Take 1 tablet by mouth twice daily       Last Visit Date (If Applicable):  8/24/1513    Next Visit Date:    5/16/2022

## 2022-05-16 ENCOUNTER — HOSPITAL ENCOUNTER (OUTPATIENT)
Age: 57
Setting detail: SPECIMEN
Discharge: HOME | End: 2022-05-16
Payer: MEDICARE

## 2022-05-16 ENCOUNTER — TELEPHONE (OUTPATIENT)
Dept: FAMILY MEDICINE CLINIC | Age: 57
End: 2022-05-16

## 2022-05-16 ENCOUNTER — OFFICE VISIT (OUTPATIENT)
Dept: FAMILY MEDICINE CLINIC | Age: 57
End: 2022-05-16
Payer: MEDICARE

## 2022-05-16 VITALS
SYSTOLIC BLOOD PRESSURE: 136 MMHG | BODY MASS INDEX: 58.86 KG/M2 | DIASTOLIC BLOOD PRESSURE: 82 MMHG | HEART RATE: 95 BPM | OXYGEN SATURATION: 93 % | WEIGHT: 315 LBS

## 2022-05-16 DIAGNOSIS — Z12.11 COLON CANCER SCREENING: ICD-10-CM

## 2022-05-16 DIAGNOSIS — I10 ESSENTIAL HYPERTENSION: ICD-10-CM

## 2022-05-16 DIAGNOSIS — Z23 NEED FOR TETANUS BOOSTER: ICD-10-CM

## 2022-05-16 DIAGNOSIS — Z00.00 INITIAL MEDICARE ANNUAL WELLNESS VISIT: Primary | ICD-10-CM

## 2022-05-16 LAB
CREATININE URINE: 77.8 MG/DL (ref 39–259)
MICROALBUMIN/CREAT 24H UR: <12 MG/L
MICROALBUMIN/CREAT UR-RTO: NORMAL MCG/MG CREAT

## 2022-05-16 PROCEDURE — 3046F HEMOGLOBIN A1C LEVEL >9.0%: CPT

## 2022-05-16 PROCEDURE — 99396 PREV VISIT EST AGE 40-64: CPT

## 2022-05-16 PROCEDURE — 90715 TDAP VACCINE 7 YRS/> IM: CPT

## 2022-05-16 PROCEDURE — 82043 UR ALBUMIN QUANTITATIVE: CPT

## 2022-05-16 PROCEDURE — G0438 PPPS, INITIAL VISIT: HCPCS

## 2022-05-16 PROCEDURE — 82570 ASSAY OF URINE CREATININE: CPT

## 2022-05-16 PROCEDURE — PBSHW TDAP (AGE 10Y AND OLDER) IM (BOOSTRIX)

## 2022-05-16 NOTE — TELEPHONE ENCOUNTER
Pt forgot to ask for a new script for his handicap placcard. Can we get this and I will call and let him know to pick it up.   Thanks

## 2022-05-16 NOTE — PROGRESS NOTES
Medicare Annual Wellness Visit    Alicia is here for Medicare AWV (patient is here today for his annual wellness check. )    Assessment & Plan   Colon cancer screening  -     Fecal DNA Colorectal cancer screening (Cologuard)  Need for tetanus booster  -     Tetanus-Diphth-Acell Pertussis (BOOSTRIX) injection 0.5 mL; 0.5 mL, IntraMUSCular, ONCE, 1 dose, On Mon 5/16/22 at 0900  Initial Medicare annual wellness visit  Uncontrolled type 2 DM with peripheral circulatory disorder ([address]  Assessment & Plan:   Borderline controlled, continue current medications and continue current treatment plan patient's been checking his blood sugar 4 times a day. Brings logs to appointment. Blood sugar is decreasing. Logs, today the average being 110. No hypoglycemia symptoms noted, also none noted on his logs. We will check a urine microalbumin, and an A1c in July at his next appointment. Last A1c was markedly elevated, and his blood sugars reflect that at that time. Since they have gradually decreased. He is encouragedTo monitor this 4 times a day continuous blood glucose monitor sent to pharmacy. They are waiting a call from pharmacy to pick this up. Both time he and his wife are instructed if they have questions on how to put on or use this monitor to please come to the office and we will assist him with this. He is to continue his insulin dosing as well as metformin at this time. Metformin 1000 mg oral twice daily with meals. NovoLog sliding scale before meals and at bedtime and Basaglar 65 units in the morning and 25 units at bedtime. Reports side effects to the office is soon as possible, side effects such as hypoglycemia are discussed in depth at this appointment.   Orders:  -     Microalbumin, Ur; Future  Essential hypertension  Assessment & Plan:   At goal, continue current medications and continue current treatment plan blood pressure today in office is in goal range, continue lisinopril 20 mg oral tab once daily. Recommendations for Preventive Services Due: see orders and patient instructions/AVS.  Recommended screening schedule for the next 5-10 years is provided to the patient in written form: see Patient Instructions/AVS.     Return in 2 months (on 7/16/2022) for Medicare Annual Wellness Visit in 1 year. Subjective   The following acute and/or chronic problems were also addressed today:  Hypertension, uncontrolled type 2 diabetes mellitusWith long-term insulin use smoking, obesity, lymphedema. Patient's complete Health Risk Assessment and screening values have been reviewed and are found in Flowsheets. The following problems were reviewed today and where indicated follow up appointments were made and/or referrals ordered.     Positive Risk Factor Screenings with Interventions:         Tobacco Use:     Tobacco Use: High Risk    Smoking Tobacco Use: Current Every Day Smoker    Smokeless Tobacco Use: Never Used     E-Cigarettes/Vaping Use     Questions Responses    E-Cigarette/Vaping Use Never User    Start Date     Passive Exposure     Quit Date     Counseling Given     Comments         Substance Use - Tobacco Interventions:  tobacco cessation tips and resources provided, patient agrees to think about his/her triggers for tobacco use, why he/she should quit, and learn more about the harmful effects of tobacco           General Health and ACP:  General  In general, how would you say your health is?: Very Good  In the past 7 days, have you experienced any of the following: New or Increased Pain, New or Increased Fatigue, Loneliness, Social Isolation, Stress or Anger?: No  Do you get the social and emotional support that you need?: Yes  Do you have a Living Will?: (!) No    Advance Directives     Power of  Living Will ACP-Advance Directive ACP-Power of     Not on File Not on File Not on File Not on File      General Health Risk Interventions:  · Pain issues: patient declines any further evaluation/treatment for this issue    Health Habits/Nutrition:     Physical Activity: Sufficiently Active    Days of Exercise per Week: 7 days    Minutes of Exercise per Session: 30 min     Have you lost any weight without trying in the past 3 months?: No     Have you seen the dentist within the past year?: (!) No    Health Habits/Nutrition Interventions:  · Inadequate physical activity:  He is to increase his activity as tolerated. Starting with only a couple minutes 2-3 of walking a day. Progressing up to 30 minutes a day. This is a long-term goal and not short-term. This could take a year to get to 30 minutes of walking a day. He is agreeable to this and understands this. Objective   Vitals:    05/16/22 0804   BP: 136/82   Site: Right Upper Arm   Position: Sitting   Pulse: 95   SpO2: 93%   Weight: (!) 428 lb (194.1 kg)      Body mass index is 58.86 kg/m².         General Appearance: alert and oriented to person, place and time, well developed and well- nourished, in no acute distress  Skin: warm and dry, no rash or erythema  Head: normocephalic and atraumatic  Eyes: pupils equal, round, and reactive to light, extraocular eye movements intact, conjunctivae normal  ENT: tympanic membrane, external ear and ear canal normal bilaterally, nose without deformity, nasal mucosa and turbinates normal without polyps  Neck: supple and non-tender without mass, no thyromegaly or thyroid nodules, no cervical lymphadenopathy  Pulmonary/Chest: clear to auscultation bilaterally- no wheezes, rales or rhonchi, normal air movement, no respiratory distress  Cardiovascular: normal rate, regular rhythm, normal S1 and S2, no murmurs, rubs, clicks, or gallops, distal pulses intact, no carotid bruits  Abdomen: soft, non-tender, non-distended, normal bowel sounds, no masses or organomegaly  Extremities: no cyanosis, clubbing or edema  Musculoskeletal: normal range of motion, no joint swelling, deformity or tenderness  Neurologic: reflexes normal and symmetric, no cranial nerve deficit, gait, coordination and speech normal  Bilateral lower leg edema, 1+. Which she states is chronic for him. Unsure if he is taking his Lasix. Allergies   Allergen Reactions    Other Other (See Comments)    Pneumococcal Polysaccharide Vaccine Hives     Prior to Visit Medications    Medication Sig Taking? Authorizing Provider   famotidine (PEPCID) 20 MG tablet Take 1 tablet by mouth twice daily Yes Piotr Parada APRN - CNP   BASAGLAR KWIKPEN 100 UNIT/ML injection pen INJECT SUBCUTANEOUSLY 65 UNITS IN THE MORNING AND 25 UNITS AT BEDTIME Yes Minna Raines APRN - CNP   NOVOLOG FLEXPEN 100 UNIT/ML injection pen INJECT UNITS SUBCUTANEOUSLY BEFORE MEAL(S) AND AT BEDTIME, IF SUGAR LESS THAN 175 INJECT O UNITS, 175-250 2 UNITS, 251-300 4 UNITS, 301-350 6 UNITS, -400 8 UNITS.  Yes Gerald Friedman APRN - CNP   oxyCODONE-acetaminophen (PERCOCET) 5-325 MG per tablet TAKE 1 TABLET BY MOUTH EVERY 8 HOURS AS NEEDED FOR PAIN FOR 3 DAYS Yes Historical Provider, MD   omeprazole (PRILOSEC) 40 MG delayed release capsule TAKE 1 CAPSULE BY MOUTH ONCE DAILY IN THE MORNING BEFORE BREAKFAST Yes Arnol Carias MD   lisinopril (PRINIVIL;ZESTRIL) 20 MG tablet Take 1 tablet by mouth daily Yes Arnol Carias MD   atorvastatin (LIPITOR) 40 MG tablet Take 1 tablet by mouth daily Yes Arnol Carias MD   metFORMIN (GLUCOPHAGE) 1000 MG tablet Take 1 tablet by mouth 2 times daily (with meals) Yes Arnol Carias MD   SILVER SULFADIAZINE EX silver sulfADIAZINE Silver Sulfadiazine Active 1 APPLICATIO TP TWICE A DAY 25 March 30th, 2021 10:11am 03-  [facility] ([id number]) Yes Historical Provider, MD   NYSTATIN 435297 UNIT/GM powder  Yes Historical Provider, MD   potassium chloride (KLOR-CON M) 20 MEQ TBCR extended release tablet  Yes Historical Provider, MD   Multiple Minerals-Vitamins (CALCIUM-MAGNESIUM-ZINC-D3) TABS Take by mouth Yes Historical Provider, MD   buPROPion [facility] SR) 150 MG extended release tablet Take 1 tablet by mouth daily Yes Pina Khalil MD   triamcinolone (ARISTOCORT) 0.5 % cream Apply topically 3 times daily. Yes Pina Khalil MD   aspirin 81 MG tablet Take 81 mg by mouth daily Yes Historical Provider, MD   docusate sodium (COLACE) 100 MG capsule TAKE ONE CAPSULE BY MOUTH TWICE DAILY Yes Historical Provider, MD   mupirocin (BACTROBAN) 2 % ointment APPLY A SMALL AMOUNT TO EACH NOSTRIL TWICE DAILY STARTING 5 DAYS PRIOR TO SURGERY Yes Historical Provider, MD   Acetaminophen (TYLENOL ARTHRITIS PAIN PO) Take 1 tablet by mouth as needed Yes Historical Provider, MD   Multiple Vitamin (MULTI-VITAMIN DAILY) TABS Take by mouth Yes Historical Provider, MD   CPAP Machine MISC by Does not apply route Yes Historical Provider, MD   Continuous Blood Gluc  (DEXCOM G4 PLATINUM ) NORRIS 1 box by Does not apply route daily Monitor Blood sugar at least 4 times a day, on insulin. IONA Corley CNP   Continuous Blood Gluc Transmit (DEXCOM G4 PLATINUM TRANSMITTER) MISC 1 each by Does not apply route daily Monitor Blood sugar at least 4 times a day, on insulin. IONA Corley CNP   Continuous Blood Gluc Sensor (DEXCOM G4 SENSOR) MISC 1 each by Does not apply route daily Monitor Blood sugar at least 4 times a day, on insulin.   IONA Corley CNP   cyclobenzaprine (FLEXERIL) 10 MG tablet Take 1 tablet by mouth three times daily as needed for muscle spasm  Patient not taking: Reported on 5/16/2022  IONA Corley CNP   RELION PEN NEEDLES 31G X 6 MM MISC USE AS DIRECTED  Historical Provider, MD   Blood Glucose Monitoring Suppl (ONE TOUCH ULTRA 2) w/Device KIT USE AS DIRECTED  Historical Provider, MD   meloxicam (MOBIC) 15 MG tablet TAKE 1 TABLET BY MOUTH ONCE DAILY AS NEEDED FOR PAIN  Patient not taking: Reported on 5/16/2022  Pina Khalil MD   blood glucose monitor kit and supplies Dispense sufficient amount for indicated testing frequency. Dispense all needed supplies to include: monitor, strips, lancing device, lancets, control solutions, alcohol swabs. Carolynn Vasquez MD   Lancets MISC 1 each by Does not apply route 2 times daily  Carolynn Vasquez MD   blood glucose monitor strips Test 2 times a day & as needed for symptoms of irregular blood glucose. Dispense sufficient amount for indicated testing frequency.   Carolynn Vasquez MD   furosemide (LASIX) 40 MG tablet TAKE 1 TABLET BY MOUTH TWICE DAILY  Patient not taking: Reported on 5/16/2022  Historical Provider, MD   Handicap Placard [address] [facility] by Does not apply route  Historical Provider, MD Cardenas (Including outside providers/suppliers regularly involved in providing care):   Patient Care Team:  IONA Alberto CNP as PCP - General (Nurse Practitioner)  IONA Alberto CNP as PCP - [facility] Provider     Reviewed and updated this visit:  Tobacco  Allergies  Meds  Problems  Med Hx  Surg Hx  Soc Hx  Fam Hx

## 2022-05-16 NOTE — PATIENT INSTRUCTIONS
Personalized Preventive Plan for Toño Fall - 5/16/2022  Medicare offers a range of preventive health benefits. Some of the tests and screenings are paid in full while other may be subject to a deductible, co-insurance, and/or copay. Some of these benefits include a comprehensive review of your medical history including lifestyle, illnesses that may run in your family, and various assessments and screenings as appropriate. After reviewing your medical record and screening and assessments performed today your provider may have ordered immunizations, labs, imaging, and/or referrals for you. A list of these orders (if applicable) as well as your Preventive Care list are included within your After Visit Summary for your review. Other Preventive Recommendations:    · A preventive eye exam performed by an eye specialist is recommended every 1-2 years to screen for glaucoma; cataracts, macular degeneration, and other eye disorders. · A preventive dental visit is recommended every 6 months. · Try to get at least 150 minutes of exercise per week or 10,000 steps per day on a pedometer . · Order or download the FREE \"Exercise & Physical Activity: Your Everyday Guide\" from [employer]. Call 5-584.781.5385 or search [employer] online. · You need 4135-0367 mg of calcium and 4308-0827 IU of vitamin D per day. It is possible to meet your calcium requirement with diet alone, but a vitamin D supplement is usually necessary to meet this goal.  · When exposed to the sun, use a sunscreen that protects against both UVA and UVB radiation with an SPF of 30 or greater. Reapply every 2 to 3 hours or after sweating, drying off with a towel, or swimming. · Always wear a seat belt when traveling in a car. Always wear a helmet when riding a bicycle or motorcycle.

## 2022-05-16 NOTE — ASSESSMENT & PLAN NOTE
At goal, continue current medications and continue current treatment plan blood pressure today in office is in goal range, continue lisinopril 20 mg oral tab once daily.
Borderline controlled, continue current medications and continue current treatment plan patient's been checking his blood sugar 4 times a day. Brings logs to appointment. Blood sugar is decreasing. Logs, today the average being 110. No hypoglycemia symptoms noted, also none noted on his logs. We will check a urine microalbumin, and an A1c in July at his next appointment. Last A1c was markedly elevated, and his blood sugars reflect that at that time. Since they have gradually decreased. He is encouragedTo monitor this 4 times a day continuous blood glucose monitor sent to pharmacy. They are waiting a call from pharmacy to pick this up. Both time he and his wife are instructed if they have questions on how to put on or use this monitor to please come to the office and we will assist him with this. He is to continue his insulin dosing as well as metformin at this time. Metformin 1000 mg oral twice daily with meals. NovoLog sliding scale before meals and at bedtime and Basaglar 65 units in the morning and 25 units at bedtime. Reports side effects to the office is soon as possible, side effects such as hypoglycemia are discussed in depth at this appointment.
- - -

## 2022-05-17 ENCOUNTER — TELEPHONE (OUTPATIENT)
Dept: FAMILY MEDICINE CLINIC | Age: 57
End: 2022-05-17

## 2022-05-17 NOTE — TELEPHONE ENCOUNTER
Spoke with [facility] regarding the status of the pt getting the dexacom. Per the company he is authorized to get the dexacom  With no out of pocket cost. They are just waiting on a call back from him to confirm the home address so they can ship it to him. As of yet no call back. I did get the phone number [phone] for his wife Danika Jeffries and left a vm to call me asap.

## 2022-06-08 ENCOUNTER — OFFICE VISIT (OUTPATIENT)
Dept: FAMILY MEDICINE CLINIC | Age: 57
End: 2022-06-08
Payer: MEDICARE

## 2022-06-08 VITALS
BODY MASS INDEX: 58.17 KG/M2 | HEART RATE: 87 BPM | OXYGEN SATURATION: 96 % | SYSTOLIC BLOOD PRESSURE: 128 MMHG | WEIGHT: 315 LBS | DIASTOLIC BLOOD PRESSURE: 76 MMHG

## 2022-06-08 DIAGNOSIS — I10 ESSENTIAL HYPERTENSION: ICD-10-CM

## 2022-06-08 PROCEDURE — 3046F HEMOGLOBIN A1C LEVEL >9.0%: CPT

## 2022-06-08 PROCEDURE — 99212 OFFICE O/P EST SF 10 MIN: CPT

## 2022-06-08 PROCEDURE — 99213 OFFICE O/P EST LOW 20 MIN: CPT

## 2022-06-08 ASSESSMENT — ENCOUNTER SYMPTOMS
BACK PAIN: 0
SHORTNESS OF BREATH: 0
CONSTIPATION: 0
DIARRHEA: 0
COUGH: 0
EYE ITCHING: 0
WHEEZING: 0
EYE DISCHARGE: 0
COLOR CHANGE: 0
CHEST TIGHTNESS: 0
RHINORRHEA: 0
ABDOMINAL PAIN: 0
NAUSEA: 0
SINUS PRESSURE: 0
SINUS PAIN: 0

## 2022-06-08 NOTE — PATIENT INSTRUCTIONS
Keep Cont. Glucose monitor on for 10 days. Removed the sensor and use applicator gun to place new. Then place sensor back into the monitor which is on the skin.

## 2022-06-08 NOTE — ASSESSMENT & PLAN NOTE
Borderline controlled, changes made today: Continue with insulin dosage, Dexcom G6 continuous blood glucose monitor placed in office today. Extensive amount of time was utilized in order to ensure timely and his daughter understood how this works, and how to apply this. Nena Clarke and his daughter verbalized understanding how this continuous blood glucose monitor works, they would change every 10 days, and let the office know what his blood sugar trends are. Also to use this to look for hypoglycemic events, able to program this machine so that it alarms either his daughter, or his wife. Time he verbalizes he understands this, and if he has having troubles applying a new 1 he will come to the office for help with this.

## 2022-06-08 NOTE — PROGRESS NOTES
Ariadna Weldon (:  1965) is a 64 y.o. male,Established patient, here for evaluation of the following chief complaint(s):  Diabetes (patient is here today to follow up with diabetes. He is checking his sugars at home and staying around the 130- 180. He did recieve his dexcom and brought it today to help  him put it on. )         ASSESSMENT/PLAN:  1. Uncontrolled type 2 DM with peripheral circulatory disorder [facility])  Assessment & Plan:   Borderline controlled, changes made today: Continue with insulin dosage, Dexcom G6 continuous blood glucose monitor placed in office today. Extensive amount of time was utilized in order to ensure timely and his daughter understood how this works, and how to apply this. Yoana Leonardo and his daughter verbalized understanding how this continuous blood glucose monitor works, they would change every 10 days, and let the office know what his blood sugar trends are. Also to use this to look for hypoglycemic events, able to program this machine so that it alarms either his daughter, or his wife. Time he verbalizes he understands this, and if he has having troubles applying a new 1 he will come to the office for help with this. 2. Essential hypertension  Assessment & Plan:   At goal, continue current medications and continue current treatment plan      Return in about 2 months (around 2022). Subjective   SUBJECTIVE/OBJECTIVE:  Jessy Kaiser is here today with his daughter for a follow-up appointment with for diabetes. Today he brings in his Dexcom continuous blood glucose monitor, this is the first time he has used a continuous blood glucose monitor. In the office today instructions are given, and this Dexcom continuous blood glucose monitor is placed. Instructions on how to use as well as troubleshooting were discussed with both Yoana Leonardo and his daughter. They are agreeable to change sensor every 10 days, and let office know if there is any problems.   He is to trend his blood sugars and let the office know what they are, bring the sensor into each appointment. Hypoglycemia as discussed as well as utilizing the alarm on this. He states he has had hypoglycemic episodes in the past, last lowest blood sugar was 90 and that was couple days ago. He did have some dizziness with this otherwise he had not had a hypoglycemic episode in more than a couple weeks. Review of Systems   Constitutional: Negative for chills, fatigue, fever and unexpected weight change. HENT: Negative for congestion, ear pain, rhinorrhea, sinus pressure and sinus pain. Eyes: Negative for discharge, itching and visual disturbance. Respiratory: Negative for cough, chest tightness, shortness of breath and wheezing. Cardiovascular: Negative for chest pain, palpitations and leg swelling. Gastrointestinal: Negative for abdominal pain, constipation, diarrhea and nausea. Endocrine: Negative for cold intolerance, heat intolerance, polydipsia and polyphagia. Genitourinary: Negative for dysuria, flank pain and frequency. Musculoskeletal: Positive for arthralgias. Negative for back pain and myalgias. Skin: Negative for color change. Allergic/Immunologic: Negative for environmental allergies and food allergies. Neurological: Negative for dizziness, weakness, light-headedness, numbness and headaches. Psychiatric/Behavioral: Negative for agitation, behavioral problems and confusion. The patient is not nervous/anxious. Objective   Physical Exam  Vitals and nursing note reviewed. Constitutional:       General: He is not in acute distress. Appearance: Normal appearance. He is not ill-appearing. HENT:      Head: Normocephalic and atraumatic. Right Ear: Tympanic membrane and external ear normal.      Left Ear: Tympanic membrane and external ear normal.      Nose: Nose normal. No congestion or rhinorrhea.       Mouth/Throat:      Mouth: Mucous membranes are moist.      Pharynx: Oropharynx is

## 2022-06-09 DIAGNOSIS — M62.830 BACK MUSCLE SPASM: ICD-10-CM

## 2022-06-09 DIAGNOSIS — R21 RASH OF BACK: ICD-10-CM

## 2022-06-09 DIAGNOSIS — J42 CHRONIC BRONCHITIS, UNSPECIFIED CHRONIC BRONCHITIS TYPE (HCC): ICD-10-CM

## 2022-06-09 DIAGNOSIS — M19.90 ARTHRITIS: ICD-10-CM

## 2022-06-09 DIAGNOSIS — E11.8 TYPE 2 DIABETES MELLITUS WITH COMPLICATION, WITHOUT LONG-TERM CURRENT USE OF INSULIN (HCC): ICD-10-CM

## 2022-06-09 DIAGNOSIS — I89.0 LYMPHEDEMA OF BOTH LOWER EXTREMITIES: ICD-10-CM

## 2022-06-09 DIAGNOSIS — E78.2 MIXED HYPERLIPIDEMIA: ICD-10-CM

## 2022-06-09 DIAGNOSIS — I87.2 VENOUS INSUFFICIENCY (CHRONIC) (PERIPHERAL): ICD-10-CM

## 2022-06-09 DIAGNOSIS — Z99.89 OSA ON CPAP: ICD-10-CM

## 2022-06-09 DIAGNOSIS — M16.11 PRIMARY OSTEOARTHRITIS OF RIGHT HIP: ICD-10-CM

## 2022-06-09 DIAGNOSIS — I10 ESSENTIAL HYPERTENSION: ICD-10-CM

## 2022-06-09 DIAGNOSIS — G47.33 OSA ON CPAP: ICD-10-CM

## 2022-06-09 DIAGNOSIS — M26.629 TEMPOROMANDIBULAR JOINT-PAIN-DYSFUNCTION SYNDROME (TMJ): ICD-10-CM

## 2022-06-09 DIAGNOSIS — E66.01 MORBID OBESITY (HCC): ICD-10-CM

## 2022-06-09 DIAGNOSIS — F17.200 SMOKING: ICD-10-CM

## 2022-06-09 RX ORDER — CYCLOBENZAPRINE HCL 10 MG
TABLET ORAL
Qty: 30 TABLET | Refills: 0 | Status: SHIPPED | OUTPATIENT
Start: 2022-06-09 | End: 2022-06-20

## 2022-06-09 RX ORDER — INSULIN GLARGINE 100 [IU]/ML
INJECTION, SOLUTION SUBCUTANEOUS
Qty: 15 ML | Refills: 0 | Status: SHIPPED | OUTPATIENT
Start: 2022-06-09 | End: 2022-07-01

## 2022-06-09 NOTE — TELEPHONE ENCOUNTER
Agata Sidhu is requesting a refill on the following medication(s):  Requested Prescriptions     Pending Prescriptions Disp Refills    cyclobenzaprine (FLEXERIL) 10 mg tablet [Pharmacy Med Name: Cyclobenzaprine HCl 10 MG Oral Tablet] 30 tablet 0     Sig: Take 1 tablet by mouth three times daily as needed for muscle spasm       Last Visit Date (If Applicable):  0/1/7823    Next Visit Date:    8/8/2022

## 2022-06-09 NOTE — TELEPHONE ENCOUNTER
Miguel Kelly is requesting a refill on the following medication(s):  Requested Prescriptions     Pending Prescriptions Disp Refills    BASAGLAR KWIKPEN 100 UNIT/ML injection pen [Pharmacy Med Name: Skyler Eugene 100 UNIT/ML Subcutaneous Solution Pen-injector] 15 mL 0     Sig: INJECT 65 UNITS SUBCUTANEOUSLY IN THE MORNING AND 25 AT BEDTIME       Last Visit Date (If Applicable):  9/8/3319    Next Visit Date:    6/9/2022

## 2022-06-20 DIAGNOSIS — L97.921 SKIN ULCER OF LEFT LOWER LEG, LIMITED TO BREAKDOWN OF SKIN (HCC): ICD-10-CM

## 2022-06-20 DIAGNOSIS — R21 RASH OF BACK: ICD-10-CM

## 2022-06-20 DIAGNOSIS — E66.01 MORBID OBESITY (HCC): ICD-10-CM

## 2022-06-20 DIAGNOSIS — K21.9 GASTROESOPHAGEAL REFLUX DISEASE WITHOUT ESOPHAGITIS: ICD-10-CM

## 2022-06-20 DIAGNOSIS — R11.10 DRY HEAVES: ICD-10-CM

## 2022-06-20 DIAGNOSIS — M62.830 BACK MUSCLE SPASM: ICD-10-CM

## 2022-06-20 DIAGNOSIS — I89.0 LYMPHEDEMA OF BOTH LOWER EXTREMITIES: ICD-10-CM

## 2022-06-20 DIAGNOSIS — Z87.11 HISTORY OF PEPTIC ULCER DISEASE: ICD-10-CM

## 2022-06-20 DIAGNOSIS — F17.200 SMOKER: ICD-10-CM

## 2022-06-20 DIAGNOSIS — I87.2 VENOUS INSUFFICIENCY (CHRONIC) (PERIPHERAL): ICD-10-CM

## 2022-06-20 DIAGNOSIS — G47.33 OSA ON CPAP: ICD-10-CM

## 2022-06-20 DIAGNOSIS — E11.8 TYPE 2 DIABETES MELLITUS WITH COMPLICATION, WITHOUT LONG-TERM CURRENT USE OF INSULIN (HCC): ICD-10-CM

## 2022-06-20 DIAGNOSIS — J42 CHRONIC BRONCHITIS, UNSPECIFIED CHRONIC BRONCHITIS TYPE (HCC): ICD-10-CM

## 2022-06-20 DIAGNOSIS — M26.629 TEMPOROMANDIBULAR JOINT-PAIN-DYSFUNCTION SYNDROME (TMJ): ICD-10-CM

## 2022-06-20 DIAGNOSIS — M16.11 PRIMARY OSTEOARTHRITIS OF RIGHT HIP: ICD-10-CM

## 2022-06-20 DIAGNOSIS — M19.90 ARTHRITIS: ICD-10-CM

## 2022-06-20 DIAGNOSIS — Z99.89 OSA ON CPAP: ICD-10-CM

## 2022-06-20 DIAGNOSIS — E78.2 MIXED HYPERLIPIDEMIA: ICD-10-CM

## 2022-06-20 DIAGNOSIS — I10 ESSENTIAL HYPERTENSION: ICD-10-CM

## 2022-06-20 DIAGNOSIS — F17.200 SMOKING: ICD-10-CM

## 2022-06-20 RX ORDER — OMEPRAZOLE 40 MG/1
CAPSULE, DELAYED RELEASE ORAL
Qty: 90 CAPSULE | Refills: 0 | Status: SHIPPED | OUTPATIENT
Start: 2022-06-20 | End: 2022-11-03

## 2022-06-20 RX ORDER — FAMOTIDINE 20 MG/1
TABLET, FILM COATED ORAL
Qty: 60 TABLET | Refills: 5 | Status: SHIPPED | OUTPATIENT
Start: 2022-06-20

## 2022-06-20 RX ORDER — CYCLOBENZAPRINE HCL 10 MG
TABLET ORAL
Qty: 30 TABLET | Refills: 1 | Status: SHIPPED | OUTPATIENT
Start: 2022-06-20 | End: 2022-08-08

## 2022-06-20 NOTE — TELEPHONE ENCOUNTER
Mg West is requesting a refill on the following medication(s):  Requested Prescriptions     Pending Prescriptions Disp Refills    omeprazole (PRILOSEC) 40 MG delayed release capsule [Pharmacy Med Name: Omeprazole 40 MG Oral Capsule Delayed Release] 90 capsule 0     Sig: TAKE 1 CAPSULE BY MOUTH ONCE DAILY IN THE MORNING BEFORE BREAKFAST       Last Visit Date (If Applicable):  9/2/8188    Next Visit Date:    8/8/2022

## 2022-06-20 NOTE — TELEPHONE ENCOUNTER
Margoth Carrera is requesting a refill on the following medication(s):  Requested Prescriptions     Pending Prescriptions Disp Refills    cyclobenzaprine (FLEXERIL) 10 MG tablet [Pharmacy Med Name: Cyclobenzaprine HCl 10 MG Oral Tablet] 30 tablet 1     Sig: Take 1 tablet by mouth three times daily as needed for muscle spasm    famotidine (PEPCID) 20 MG tablet [Pharmacy Med Name: Famotidine 20 MG Oral Tablet] 60 tablet 5     Sig: Take 1 tablet by mouth twice daily       Last Visit Date (If Applicable):  5/4/6556    Next Visit Date:    6/20/2022

## 2022-07-01 RX ORDER — INSULIN GLARGINE 100 [IU]/ML
INJECTION, SOLUTION SUBCUTANEOUS
Qty: 15 ML | Refills: 0 | Status: SHIPPED | OUTPATIENT
Start: 2022-07-01 | End: 2022-07-19

## 2022-07-01 NOTE — TELEPHONE ENCOUNTER
Brianna Shelby is requesting a refill on the following medication(s):  Requested Prescriptions     Pending Prescriptions Disp Refills    BASAGLAR KWIKPEN 100 UNIT/ML injection pen [Pharmacy Med Name: Csear Toro 100 UNIT/ML Subcutaneous Solution Pen-injector] 15 mL 0     Sig: INJECT 65 UNITS SUBCUTANEOUSLY IN THE MORNING AND 25 UNITS AT BEDTIME       Last Visit Date (If Applicable):  4/3/5146    Next Visit Date:    8/8/2022

## 2022-07-13 ENCOUNTER — TELEPHONE (OUTPATIENT)
Dept: PHARMACY | Facility: CLINIC | Age: 57
End: 2022-07-13

## 2022-07-13 NOTE — TELEPHONE ENCOUNTER
[facility] CLINICAL PHARMACY: ADHERENCE REVIEW  Ident[facility] care [facility] per [employer]    Last Visit: 06.08.22    Patient also appears to be prescribed: Lisinopril & Metformin         ASSESSMENT  ACE/ARB ADHERENCE    Per [employer] Portal:  Lisinopril last filled on 11.12.21 for 90 day supply. BP Readings from Last 3 Encounters:   06/08/22 128/76   05/16/22 136/82   04/14/22 126/78     CrCl cannot be calculated (Patient's most recent lab result is older than the maximum 180 days allowed. ). DIABETES ADHERENCE    Per [employer] Portal:  Metformin last filled on 02.08.22 for 90 day supply. Lab Results   Component Value Date    LABA1C 13.1 03/17/2022    LABA1C 7.0 06/14/2021    LABA1C 6.6 10/15/2020     NOTE A1c >9%    STATIN ADHERENCE    Insurance Records claims through 06.12.22 (Prior Year PDC = PASSED; YTD [facility] = 81%; Potential Fail Date: 07.25.22): Atorvastatin last filled on 02.23.22 for 90 day supply. Next refill due: 05.24.22    Per [facility] Portal:  (same as above). Lab Results   Component Value Date    CHOL 128 08/07/2020    TRIG 124 08/07/2020    HDL 36 (L) 08/07/2020    LDLCHOLESTEROL 67 08/07/2020    LDLCALC 65.6 07/29/2019     ALT   Date Value Ref Range Status   08/07/2020 30 5 - 41 U/L Final     AST   Date Value Ref Range Status   08/07/2020 26 <40 U/L Final     The ASCVD Risk score (Telly Joya, et al., 2013) failed to calculate for the following reasons: The valid total cholesterol range is 130 to 320 mg/dL     PLAN  The following are interventions that have been identified:   - Patient overdue refilling Atorvastatin, Lisinopril, & Metformin and active on home medication list.     Attempting to reach patient to review.  Left message asking for return call.          Future Appointments   Date Time Provider Elisa Valencia   7/27/2022  1:30 PM CARLOS Joy [facility]   8/8/2022  8:15 AM Claudeen Fridge, APRN - CNP [facility]       Katina Noble, [address] [facility] [address]  Phone: toll free [phone]

## 2022-07-14 DIAGNOSIS — F17.200 SMOKING: ICD-10-CM

## 2022-07-14 DIAGNOSIS — E78.2 MIXED HYPERLIPIDEMIA: ICD-10-CM

## 2022-07-14 DIAGNOSIS — I87.2 VENOUS INSUFFICIENCY (CHRONIC) (PERIPHERAL): ICD-10-CM

## 2022-07-14 DIAGNOSIS — M26.629 TEMPOROMANDIBULAR JOINT-PAIN-DYSFUNCTION SYNDROME (TMJ): ICD-10-CM

## 2022-07-14 DIAGNOSIS — M16.11 PRIMARY OSTEOARTHRITIS OF RIGHT HIP: ICD-10-CM

## 2022-07-14 DIAGNOSIS — M62.830 BACK MUSCLE SPASM: ICD-10-CM

## 2022-07-14 DIAGNOSIS — E11.8 TYPE 2 DIABETES MELLITUS WITH COMPLICATION, WITHOUT LONG-TERM CURRENT USE OF INSULIN (HCC): ICD-10-CM

## 2022-07-14 DIAGNOSIS — R21 RASH OF BACK: ICD-10-CM

## 2022-07-14 DIAGNOSIS — J42 CHRONIC BRONCHITIS, UNSPECIFIED CHRONIC BRONCHITIS TYPE (HCC): ICD-10-CM

## 2022-07-14 DIAGNOSIS — I89.0 LYMPHEDEMA OF BOTH LOWER EXTREMITIES: ICD-10-CM

## 2022-07-14 DIAGNOSIS — M19.90 ARTHRITIS: ICD-10-CM

## 2022-07-14 DIAGNOSIS — Z99.89 OSA ON CPAP: ICD-10-CM

## 2022-07-14 DIAGNOSIS — I10 ESSENTIAL HYPERTENSION: ICD-10-CM

## 2022-07-14 DIAGNOSIS — G47.33 OSA ON CPAP: ICD-10-CM

## 2022-07-14 DIAGNOSIS — E66.01 MORBID OBESITY (HCC): ICD-10-CM

## 2022-07-14 RX ORDER — ATORVASTATIN CALCIUM 40 MG/1
40 TABLET, FILM COATED ORAL DAILY
Qty: 100 TABLET | Refills: 3 | Status: SHIPPED | OUTPATIENT
Start: 2022-07-14

## 2022-07-14 RX ORDER — LISINOPRIL 20 MG/1
20 TABLET ORAL DAILY
Qty: 100 TABLET | Refills: 3 | Status: SHIPPED | OUTPATIENT
Start: 2022-07-14

## 2022-07-14 NOTE — TELEPHONE ENCOUNTER
Noted atorvastatin, lisinopril and metformin reordered by PCP, thank you! Spoke to [facility] - confirm the 3 rxs are ready for patient to  and billed to patient's insurance on record. Gets text message too that rxs are ready to .     Left message advising wife that rxs were reordered and ready to  at [facility].    =======================================================   For Pharmacy Admin Tracking Only     CPA in place:  No   Recommendation Provided To: Provider: 3 via Note to Provider and Patient/Caregiver: 3 via Telephone   Intervention Detail: Adherence Monitoring: 3   Gap Closed?: Yes    Intervention Accepted By: Provider: 3 and Patient/Caregiver: 3   Time Spent (min): 15

## 2022-07-14 NOTE — TELEPHONE ENCOUNTER
Wife returned adherence call (see 7/13/22 telephone message). Wife confirms patient is taking Atorvastatin 40 mg daily, Lisinopril 20 mg daily, and Metformin 1000 mg twice a day. Wife admits to needing refills of all three medications, she confirms they use [facility] pharmacy (on file), and provider is now TAMMY Tran (on file). She would like for us to reach out to provider for 90 day refills. Will route to PharmD.

## 2022-07-19 RX ORDER — INSULIN GLARGINE 100 [IU]/ML
INJECTION, SOLUTION SUBCUTANEOUS
Qty: 15 ML | Refills: 5 | Status: SHIPPED | OUTPATIENT
Start: 2022-07-19

## 2022-07-19 NOTE — TELEPHONE ENCOUNTER
Elizabeth Sanz is requesting a refill on the following medication(s):  Requested Prescriptions     Pending Prescriptions Disp Refills    BASAGLAR KWIKPEN 100 UNIT/ML injection pen [Pharmacy Med Name: Vinod Gray 100 UNIT/ML Subcutaneous Solution Pen-injector] 15 mL 5     Sig: INJECT 65 UNITS SUBCUTANEOUSLY IN THE MORNING AND 25 UNITS AT BEDTIME       Last Visit Date (If Applicable):  2/3/0964    Next Visit Date:    8/8/2022

## 2022-07-27 ENCOUNTER — OFFICE VISIT (OUTPATIENT)
Dept: PODIATRY | Age: 57
End: 2022-07-27
Payer: MEDICARE

## 2022-07-27 VITALS — DIASTOLIC BLOOD PRESSURE: 80 MMHG | HEART RATE: 100 BPM | SYSTOLIC BLOOD PRESSURE: 132 MMHG | RESPIRATION RATE: 24 BRPM

## 2022-07-27 DIAGNOSIS — B35.1 DERMATOPHYTOSIS OF NAIL: ICD-10-CM

## 2022-07-27 DIAGNOSIS — E11.51 CONTROLLED TYPE 2 DM WITH PERIPHERAL CIRCULATORY DISORDER (HCC): Primary | ICD-10-CM

## 2022-07-27 PROCEDURE — 99999 PR OFFICE/OUTPT VISIT,PROCEDURE ONLY: CPT | Performed by: PODIATRIST

## 2022-07-27 PROCEDURE — 11720 DEBRIDE NAIL 1-5: CPT | Performed by: PODIATRIST

## 2022-07-27 NOTE — PROGRESS NOTES
Subjective:  Patient presents to [facility] today for routine diabetic foot care. Patient denies any new problems with their feet. Patient's diabetic control has been not changed. Allergies   Allergen Reactions    Other Other (See Comments)    Pneumococcal Polysaccharide Vaccine Hives       Past Medical History:   Diagnosis Date    Hyperlipidemia     Hypertriglyceridemia     Obesity     Tobacco abuse        Prior to Admission medications    Medication Sig Start Date End Date Taking? Authorizing Provider   BASAGLAR KWIKPEN 100 UNIT/ML injection pen INJECT 65 UNITS SUBCUTANEOUSLY IN THE MORNING AND 25 UNITS AT BEDTIME 7/19/22  Yes IONA Shields CNP   atorvastatin (LIPITOR) 40 MG tablet Take 1 tablet by mouth daily 7/14/22  IONA Devine CNP   lisinopril (PRINIVIL;ZESTRIL) 20 MG tablet Take 1 tablet by mouth daily 7/14/22  Yes IONA Shields CNP   metFORMIN (GLUCOPHAGE) 1000 MG tablet Take 1 tablet by mouth 2 times daily (with meals) 7/14/22  Yes IONA Shields CNP   cyclobenzaprine (FLEXERIL) 10 MG tablet Take 1 tablet by mouth three times daily as needed for muscle spasm 6/20/22  IOAN Devine CNP   omeprazole (PRILOSEC) 40 MG delayed release capsule TAKE 1 CAPSULE BY MOUTH ONCE DAILY IN THE MORNING BEFORE BREAKFAST 6/20/22  Yes IONA Shields CNP   famotidine (PEPCID) 20 MG tablet Take 1 tablet by mouth twice daily 6/20/22  Yes IONA Shields CNP   Handicap Placard MISC by Does not apply route The disability is expected to last 5 years  Dx: DM type 2 with neuropathy, Lymphadenia, 5/16/22  Yes IONA Shields CNP   Continuous Blood Gluc  (DEXCOM G4 PLATINUM ) NORRIS 1 box by Does not apply route daily Monitor Blood sugar at least 4 times a day, on insulin.  4/28/22  Yes IONA Shields CNP   Continuous Blood Gluc Transmit (DEXCOM G4 PLATINUM TRANSMITTER) MISC 1 each by Does not apply route daily Monitor Blood sugar at least 4 times a day, on insulin. 4/28/22  Yes IONA Gonzalez CNP   Continuous Blood Gluc Sensor (DEXCOM G4 SENSOR) MISC 1 each by Does not apply route daily Monitor Blood sugar at least 4 times a day, on insulin. 4/28/22  Yes IONA Gonzalez CNP   NOVOLOG FLEXPEN 100 UNIT/ML injection pen INJECT UNITS SUBCUTANEOUSLY BEFORE MEAL(S) AND AT BEDTIME, IF SUGAR LESS THAN 175 INJECT O UNITS, 175-250 2 UNITS, 251-300 4 UNITS, 301-350 6 UNITS, -400 8 UNITS. 3/28/22  Yes IONA Reese CNP   RELION PEN NEEDLES 31G X 6 MM MISC USE AS DIRECTED 3/7/22  Yes Historical Provider, MD   oxyCODONE-acetaminophen (PERCOCET) 5-325 MG per tablet TAKE 1 TABLET BY MOUTH EVERY 8 HOURS AS NEEDED FOR PAIN FOR 3 DAYS 2/11/22  Yes Historical Provider, MD   Blood Glucose Monitoring Suppl (ONE TOUCH ULTRA 2) w/Device KIT USE AS DIRECTED 3/7/22  Yes Historical Provider, MD   meloxicam (MOBIC) 15 MG tablet TAKE 1 TABLET BY MOUTH ONCE DAILY AS NEEDED FOR PAIN 1/31/22  Yes Tenzin Giron MD   blood glucose monitor kit and supplies Dispense sufficient amount for indicated testing frequency. Dispense all needed supplies to include: monitor, strips, lancing device, lancets, control solutions, alcohol swabs. 4/23/21  Yes Tenzin Giron MD   Lancets MISC 1 each by Does not apply route 2 times daily 4/23/21  Yes Tenzin Giron MD   blood glucose monitor strips Test 2 times a day & as needed for symptoms of irregular blood glucose. Dispense sufficient amount for indicated testing frequency.  4/23/21  Yes Tenzin Giron MD   SILVER SULFADIAZINE EX silver sulfADIAZINE Silver Sulfadiazine Active 1 APPLICATIO TP TWICE A DAY 25 March 30th, 2021 10:11am 03-  [facility] ([id number]) 3/30/21  Yes Historical Provider, MD   NYSTATIN 170038 UNIT/GM powder  3/30/21  Yes Historical Provider, MD   potassium chloride (KLOR-CON M) 20 MEQ TBCR extended release tablet  4/10/21  Yes Historical Provider, MD   furosemide (LASIX) 40 MG tablet TAKE 1 TABLET BY MOUTH TWICE DAILY 4/9/21  Yes Historical Provider, MD   Multiple Minerals-Vitamins (CALCIUM-MAGNESIUM-ZINC-D3) TABS Take by mouth   Yes Historical Provider, MD   buPROPion [facility] SR) 150 MG extended release tablet Take 1 tablet by mouth daily 7/16/20  Yes Armando Ramos MD   triamcinolone (ARISTOCORT) 0.5 % cream Apply topically 3 times daily. 7/16/20  Yes Armando Ramos MD   aspirin 81 MG tablet Take 81 mg by mouth daily   Yes Historical Provider, MD   docusate sodium (COLACE) 100 MG capsule TAKE ONE CAPSULE BY MOUTH TWICE DAILY 12/17/19  Yes Historical Provider, MD   mupirocin (BACTROBAN) 2 % ointment APPLY A SMALL AMOUNT TO EACH NOSTRIL TWICE DAILY STARTING 5 DAYS PRIOR TO SURGERY 12/4/19  Yes Historical Provider, MD   Acetaminophen (TYLENOL ARTHRITIS PAIN PO) Take 1 tablet by mouth as needed   Yes Historical Provider, MD   Multiple Vitamin (MULTI-VITAMIN DAILY) TABS Take by mouth   Yes Historical Provider, MD   CPAP Machine MISC by Does not apply route   Yes Historical Provider, MD       Social History     Tobacco Use    Smoking status: Every Day     Packs/day: 0.37     Types: Cigarettes    Smokeless tobacco: Never    Tobacco comments:     Patient trying to cut down. Substance Use Topics    Alcohol use: No     ROS: All 14 ROS systems reviewed and pertinent positives noted above, all others negative. Objective:  Vascular: DP and PT pulses palpable 2/4, bilateral.  CFT <3 seconds, bilateral.  Hair growth absent to the level of the digits, bilateral.  Edema present, bilateral.  Varicosities present, bilateral. Erythema absent, bilateral. Distal Rubor absent bilateral.  Temperature decreased bilateral. Hyperpigmentation present bilateral. Atrophic skin yes.   Neurological: Sensation intact to light touch to level of digits, bilateral.  Protective sensation intact 10/10 sites via 5.07/10g Morehead-Matilde Monofilament, bilateral.  negative Tinel's, bilateral.  negative Valleix sign, bilateral.  Vibratory intact bilateral.  Reflexes Decreased bilateral.  Paresthesias negative. Dysthesias negative. Sharp/dull intact bilateral.    Integument:  Open lesion absent, Bilateral.  Interdigital maceration absent to web spaces,absent Bilateral.  Nails left 5 and right 1, 5 thickened, dystrophic and crumbly, discolored with subungual debris. Fissures absent, Bilateral. Hyperkeratotic tissue is absent. Assessment:    Diagnosis Orders   1. Controlled type 2 DM with peripheral circulatory disorder ([facility] [address]        2. Dermatophytosis of nail            Plan:  Diabetic foot education and exam.  Nails as mentioned above debrided in length and thickness. Patient advised about OTC treatments for nails and callous. Patient will follow up in 10 weeks for routine foot care or PRN if any further problems arise.

## 2022-07-27 NOTE — PROGRESS NOTES
Foot Care Worksheet  PCP: IONA Denis - CNP  Last visit: 06 / 08 / 2022    Nail description:  Thick , Yellow , Crumbly , Marked limitation of ambulation     Pain resulting from thickened and dystrophy of nail plate No    Nails involved  Right   1 5 (T5-T9)  Left     5  (TA-T4)    Q7 1 Class A Finding - Non traumatic amputation of foot No    Q8 2 Class B Findings - Absent DP pulse No, Absent PT pulse No, Advanced tropic changes (3 required) No    Decrease hair growth Yes, Nail changes/thickening Yes, Pigmented changes/discoloration Yes, Skin texture (thin, shiny) Yes, Skin color (rubor/redness) No    Q9 1 Class B and 2 Class C Findings  Claudication No, Temperature change Yes, Paresthesia No, Burning No, Edema Yes

## 2022-08-06 DIAGNOSIS — M16.11 PRIMARY OSTEOARTHRITIS OF RIGHT HIP: ICD-10-CM

## 2022-08-06 DIAGNOSIS — I87.2 VENOUS INSUFFICIENCY (CHRONIC) (PERIPHERAL): ICD-10-CM

## 2022-08-06 DIAGNOSIS — M26.629 TEMPOROMANDIBULAR JOINT-PAIN-DYSFUNCTION SYNDROME (TMJ): ICD-10-CM

## 2022-08-06 DIAGNOSIS — Z99.89 OSA ON CPAP: ICD-10-CM

## 2022-08-06 DIAGNOSIS — G47.33 OSA ON CPAP: ICD-10-CM

## 2022-08-06 DIAGNOSIS — J42 CHRONIC BRONCHITIS, UNSPECIFIED CHRONIC BRONCHITIS TYPE (HCC): ICD-10-CM

## 2022-08-06 DIAGNOSIS — I89.0 LYMPHEDEMA OF BOTH LOWER EXTREMITIES: ICD-10-CM

## 2022-08-06 DIAGNOSIS — E66.01 MORBID OBESITY (HCC): ICD-10-CM

## 2022-08-06 DIAGNOSIS — I10 ESSENTIAL HYPERTENSION: ICD-10-CM

## 2022-08-06 DIAGNOSIS — E11.8 TYPE 2 DIABETES MELLITUS WITH COMPLICATION, WITHOUT LONG-TERM CURRENT USE OF INSULIN (HCC): ICD-10-CM

## 2022-08-06 DIAGNOSIS — M19.90 ARTHRITIS: ICD-10-CM

## 2022-08-06 DIAGNOSIS — E78.2 MIXED HYPERLIPIDEMIA: ICD-10-CM

## 2022-08-06 DIAGNOSIS — R21 RASH OF BACK: ICD-10-CM

## 2022-08-06 DIAGNOSIS — F17.200 SMOKING: ICD-10-CM

## 2022-08-06 DIAGNOSIS — M62.830 BACK MUSCLE SPASM: ICD-10-CM

## 2022-08-08 ENCOUNTER — OFFICE VISIT (OUTPATIENT)
Dept: FAMILY MEDICINE CLINIC | Age: 57
End: 2022-08-08
Payer: MEDICARE

## 2022-08-08 VITALS — SYSTOLIC BLOOD PRESSURE: 132 MMHG | DIASTOLIC BLOOD PRESSURE: 84 MMHG | OXYGEN SATURATION: 95 % | HEART RATE: 89 BPM

## 2022-08-08 DIAGNOSIS — L08.9 SKIN INFECTION: ICD-10-CM

## 2022-08-08 DIAGNOSIS — Z12.5 SCREENING PSA (PROSTATE SPECIFIC ANTIGEN): ICD-10-CM

## 2022-08-08 DIAGNOSIS — E78.5 HYPERLIPIDEMIA, UNSPECIFIED HYPERLIPIDEMIA TYPE: ICD-10-CM

## 2022-08-08 DIAGNOSIS — I10 ESSENTIAL HYPERTENSION: ICD-10-CM

## 2022-08-08 DIAGNOSIS — E78.00 HYPERCHOLESTEROLEMIA: ICD-10-CM

## 2022-08-08 LAB — HBA1C MFR BLD: 6.2 %

## 2022-08-08 PROCEDURE — 3044F HG A1C LEVEL LT 7.0%: CPT

## 2022-08-08 PROCEDURE — 99212 OFFICE O/P EST SF 10 MIN: CPT

## 2022-08-08 PROCEDURE — 99214 OFFICE O/P EST MOD 30 MIN: CPT

## 2022-08-08 PROCEDURE — 83036 HEMOGLOBIN GLYCOSYLATED A1C: CPT

## 2022-08-08 PROCEDURE — PBSHW POCT GLYCOSYLATED HEMOGLOBIN (HGB A1C)

## 2022-08-08 RX ORDER — CEPHALEXIN 500 MG/1
500 CAPSULE ORAL 4 TIMES DAILY
Qty: 28 CAPSULE | Refills: 0 | Status: SHIPPED | OUTPATIENT
Start: 2022-08-08 | End: 2022-08-15

## 2022-08-08 RX ORDER — CYCLOBENZAPRINE HCL 10 MG
TABLET ORAL
Qty: 30 TABLET | Refills: 0 | Status: SHIPPED | OUTPATIENT
Start: 2022-08-08 | End: 2022-08-29

## 2022-08-08 ASSESSMENT — ENCOUNTER SYMPTOMS
CHEST TIGHTNESS: 0
EYE ITCHING: 0
COLOR CHANGE: 0
SINUS PAIN: 0
DIARRHEA: 0
COUGH: 0
EYE DISCHARGE: 0
WHEEZING: 0
SINUS PRESSURE: 0
BACK PAIN: 0
CONSTIPATION: 0
RHINORRHEA: 0
ABDOMINAL PAIN: 0
SHORTNESS OF BREATH: 0
NAUSEA: 0

## 2022-08-08 NOTE — PROGRESS NOTES
Marino Abraham (:  1965) is a 62 y.o. male,Established patient, here for evaluation of the following chief complaint(s):  Diabetes (Patient is here today for a follow up on diabetes. )         ASSESSMENT/PLAN:  1. Uncontrolled type 2 DM with peripheral circulatory disorder (HCC)  -     POCT Hb A1C (glycosylated hemoglobin)  -      DIABETES FOOT EXAM  -     Comprehensive Metabolic Panel, Fasting; Future  2. Essential hypertension  -     Comprehensive Metabolic Panel, Fasting; Future  3. Hyperlipidemia, unspecified hyperlipidemia type  -     Lipid, Fasting; Future  4. Hypercholesterolemia  -     Lipid, Fasting; Future  5. Screening PSA (prostate specific antigen)  -     PSA Screening; Future  6. Skin infection  -     cephALEXin (KEFLEX) 500 MG capsule; Take 1 capsule by mouth in the morning and 1 capsule at noon and 1 capsule in the evening and 1 capsule before bedtime. Do all this for 7 days. , Disp-28 capsule, R-0Normal      No follow-ups on file. Subjective   SUBJECTIVE/OBJECTIVE:  Sanket Urbina is here today for follow-up appointment. He is currently on short and long-acting insulin as well as metformin. Within the last 3 months we have gotten him a continuous blood glucose monitor which he has been using with good efficacy. His A1c today is well within goal, denies any hypoglycemic episodes. Diabetic Foot Exam    Deformities: No  None, hammertoe, bunion, and arthritic change  Pulses:  normal,   Edema: abnormal - 1+  Skin lesions: normal  Callous:  No  Nails: abnormal: thick, does still see podiatry    Sensory exam  Monofilament Sensation Left Foot: 10/10   Monofilament Sensation Right Foot: 10/10            Review of Systems   Constitutional:  Negative for chills, fatigue, fever and unexpected weight change. HENT:  Negative for congestion, ear pain, rhinorrhea, sinus pressure and sinus pain. Eyes:  Negative for discharge, itching and visual disturbance.    Respiratory:  Negative for cough, chest tightness, shortness of breath and wheezing. Cardiovascular:  Negative for chest pain, palpitations and leg swelling. Gastrointestinal:  Negative for abdominal pain, constipation, diarrhea and nausea. Endocrine: Negative for cold intolerance, heat intolerance, polydipsia and polyphagia. Genitourinary:  Negative for dysuria, flank pain and frequency. Musculoskeletal:  Negative for arthralgias, back pain and myalgias. Skin:  Positive for wound. Negative for color change. Neurological:  Negative for dizziness, weakness, light-headedness, numbness and headaches. Psychiatric/Behavioral:  Negative for agitation, behavioral problems and confusion. The patient is not nervous/anxious. Objective   Physical Exam  Vitals and nursing note reviewed. Constitutional:       General: He is not in acute distress. Appearance: Normal appearance. He is not ill-appearing. HENT:      Head: Normocephalic and atraumatic. Right Ear: Tympanic membrane and external ear normal.      Left Ear: Tympanic membrane and external ear normal.      Nose: Nose normal. No congestion or rhinorrhea. Mouth/Throat:      Mouth: Mucous membranes are moist.      Pharynx: Oropharynx is clear. No posterior oropharyngeal erythema. Eyes:      Pupils: Pupils are equal, round, and reactive to light. Cardiovascular:      Rate and Rhythm: Normal rate and regular rhythm. Pulses: Normal pulses. Heart sounds: Normal heart sounds. Pulmonary:      Effort: Pulmonary effort is normal.      Breath sounds: Normal breath sounds. No wheezing or rhonchi. Abdominal:      General: Bowel sounds are normal.      Palpations: There is no mass. Tenderness: There is no abdominal tenderness. Musculoskeletal:         General: No swelling or tenderness. Normal range of motion. Cervical back: Normal range of motion. No rigidity or tenderness. Feet:      Right foot:      Protective Sensation: 10 sites tested.   10 sites sensed. Left foot:      Protective Sensation: 10 sites tested. 10 sites sensed. Skin:     General: Skin is warm and dry. Capillary Refill: Capillary refill takes less than 2 seconds. Coloration: Skin is not pale. Findings: No erythema. Comments: Noted small ulceration, scabbed with no purulent drainage. Minimal amount of the erythema around it. We will treat as skin infection   Neurological:      General: No focal deficit present. Mental Status: He is alert and oriented to person, place, and time. Psychiatric:         Mood and Affect: Mood normal.         Behavior: Behavior normal.         Thought Content: Thought content normal.         Judgment: Judgment normal.                An electronic signature was used to authenticate this note.     --IONA Hicks - CNP

## 2022-08-08 NOTE — TELEPHONE ENCOUNTER
Marino Abraham is requesting a refill on the following medication(s):  Requested Prescriptions     Pending Prescriptions Disp Refills    cyclobenzaprine (FLEXERIL) 10 MG tablet [Pharmacy Med Name: Cyclobenzaprine HCl 10 MG Oral Tablet] 30 tablet 0     Sig: Take 1 tablet by mouth three times daily as needed for muscle spasm       Last Visit Date (If Applicable):  5/9/0214    Next Visit Date:    8/8/2022

## 2022-08-08 NOTE — ASSESSMENT & PLAN NOTE
Well-controlled, continue current medications and continue current treatment plan currently on insulin Basaglar 65 units in the morning and 25 units at night, metformin 1000 mg twice daily, NovoLog before meals and bedtime per sliding scale. Uses Dexcom continuous blood glucose monitor, with good efficacy. Log is reviewed, blood sugars remain in goal range. Today's A1c 6.2. Markus George states that he is having good efficacy with current medications, denies side effects, denies any hypoglycemic events. Lowest blood sugars in the 70s at night.

## 2022-08-08 NOTE — ASSESSMENT & PLAN NOTE
At goal, continue current medications and continue current treatment plan continue Lipitor 40 mg tablets once nightly, as well as diet and exercise is reinforced. Lipid panel ordered, time is to have this drawn within the next week, office will call him with results.

## 2022-08-08 NOTE — ASSESSMENT & PLAN NOTE
Well-controlled, continue current medications and continue current treatment plan currently on Lasix 40 mg tablets 1 tablet twice daily. We will continue to monitor electrolytes. CMP ordered. Blood pressure within goal today. Continue lisinopril 20 mg tablets once daily. Denies any side effects such as dry cough, he is to continue monitor this. Continue diet and exercise, diet low in sodium, low in saturated fats.

## 2022-08-19 LAB
ALBUMIN/GLOBULIN RATIO: 1.2 G/DL
ALBUMIN: 3.9 G/DL (ref 3.5–5)
ALP BLD-CCNC: 97 UNITS/L (ref 38–126)
ALT SERPL-CCNC: 39 UNITS/L (ref 4–50)
ANION GAP SERPL CALCULATED.3IONS-SCNC: 6.6 MMOL/L
AST SERPL-CCNC: 35 UNITS/L (ref 17–59)
BILIRUB SERPL-MCNC: 0.4 MG/DL (ref 0.2–1.3)
BUN BLDV-MCNC: 16 MG/DL (ref 9–20)
CALCIUM SERPL-MCNC: 9.2 MG/DL (ref 8.4–10.2)
CHLORIDE BLD-SCNC: 105 MMOL/L (ref 98–120)
CHOLESTEROL/HDL RATIO: 4.86 RATIO (ref 0–4.5)
CHOLESTEROL: 170 MG/DL (ref 50–200)
CO2: 25 MMOL/L (ref 22–31)
CREAT SERPL-MCNC: 0.9 MG/DL (ref 0.7–1.3)
DIAGNOSTIC PSA: 0.35 NG/ML (ref 0–4)
GFR CALCULATED: > 60
GLOBULIN: 3.4 G/DL
GLUCOSE: 114 MG/DL (ref 75–110)
HDLC SERPL-MCNC: 35 MG/DL (ref 36–68)
LDL CHOLESTEROL CALCULATED: 99.2 MG/DL (ref 0–160)
POTASSIUM SERPL-SCNC: 4.5 MMOL/L (ref 3.6–5)
SODIUM BLD-SCNC: 137 MMOL/L (ref 135–145)
TOTAL PROTEIN, SERUM: 7.3 G/DL (ref 6.3–8.2)
TRIGL SERPL-MCNC: 179 MG/DL (ref 10–250)
VLDLC SERPL CALC-MCNC: 36 MG/DL (ref 0–50)

## 2022-08-29 DIAGNOSIS — I89.0 LYMPHEDEMA OF BOTH LOWER EXTREMITIES: ICD-10-CM

## 2022-08-29 DIAGNOSIS — M16.11 PRIMARY OSTEOARTHRITIS OF RIGHT HIP: ICD-10-CM

## 2022-08-29 DIAGNOSIS — E66.01 MORBID OBESITY (HCC): ICD-10-CM

## 2022-08-29 DIAGNOSIS — Z99.89 OSA ON CPAP: ICD-10-CM

## 2022-08-29 DIAGNOSIS — G47.33 OSA ON CPAP: ICD-10-CM

## 2022-08-29 DIAGNOSIS — I10 ESSENTIAL HYPERTENSION: ICD-10-CM

## 2022-08-29 DIAGNOSIS — E78.2 MIXED HYPERLIPIDEMIA: ICD-10-CM

## 2022-08-29 DIAGNOSIS — F17.200 SMOKING: ICD-10-CM

## 2022-08-29 DIAGNOSIS — R21 RASH OF BACK: ICD-10-CM

## 2022-08-29 DIAGNOSIS — M19.90 ARTHRITIS: ICD-10-CM

## 2022-08-29 DIAGNOSIS — M62.830 BACK MUSCLE SPASM: ICD-10-CM

## 2022-08-29 DIAGNOSIS — E11.8 TYPE 2 DIABETES MELLITUS WITH COMPLICATION, WITHOUT LONG-TERM CURRENT USE OF INSULIN (HCC): ICD-10-CM

## 2022-08-29 DIAGNOSIS — J42 CHRONIC BRONCHITIS, UNSPECIFIED CHRONIC BRONCHITIS TYPE (HCC): ICD-10-CM

## 2022-08-29 DIAGNOSIS — I87.2 VENOUS INSUFFICIENCY (CHRONIC) (PERIPHERAL): ICD-10-CM

## 2022-08-29 DIAGNOSIS — M26.629 TEMPOROMANDIBULAR JOINT-PAIN-DYSFUNCTION SYNDROME (TMJ): ICD-10-CM

## 2022-08-29 RX ORDER — CYCLOBENZAPRINE HCL 10 MG
TABLET ORAL
Qty: 30 TABLET | Refills: 0 | Status: SHIPPED | OUTPATIENT
Start: 2022-08-29 | End: 2022-09-19

## 2022-08-29 NOTE — TELEPHONE ENCOUNTER
Holly Cross is requesting a refill on the following medication(s):  Requested Prescriptions     Pending Prescriptions Disp Refills    cyclobenzaprine (FLEXERIL) 10 MG tablet [Pharmacy Med Name: Cyclobenzaprine HCl 10 MG Oral Tablet] 30 tablet 0     Sig: Take 1 tablet by mouth three times daily as needed for muscle spasm       Last Visit Date (If Applicable):  1/1/1894    Next Visit Date:    11/8/2022

## 2022-09-19 DIAGNOSIS — I10 ESSENTIAL HYPERTENSION: ICD-10-CM

## 2022-09-19 DIAGNOSIS — F17.200 SMOKING: ICD-10-CM

## 2022-09-19 DIAGNOSIS — E66.01 MORBID OBESITY (HCC): ICD-10-CM

## 2022-09-19 DIAGNOSIS — Z99.89 OSA ON CPAP: ICD-10-CM

## 2022-09-19 DIAGNOSIS — I89.0 LYMPHEDEMA OF BOTH LOWER EXTREMITIES: ICD-10-CM

## 2022-09-19 DIAGNOSIS — M26.629 TEMPOROMANDIBULAR JOINT-PAIN-DYSFUNCTION SYNDROME (TMJ): ICD-10-CM

## 2022-09-19 DIAGNOSIS — M16.11 PRIMARY OSTEOARTHRITIS OF RIGHT HIP: ICD-10-CM

## 2022-09-19 DIAGNOSIS — R21 RASH OF BACK: ICD-10-CM

## 2022-09-19 DIAGNOSIS — M19.90 ARTHRITIS: ICD-10-CM

## 2022-09-19 DIAGNOSIS — M62.830 BACK MUSCLE SPASM: ICD-10-CM

## 2022-09-19 DIAGNOSIS — J42 CHRONIC BRONCHITIS, UNSPECIFIED CHRONIC BRONCHITIS TYPE (HCC): ICD-10-CM

## 2022-09-19 DIAGNOSIS — E78.2 MIXED HYPERLIPIDEMIA: ICD-10-CM

## 2022-09-19 DIAGNOSIS — G47.33 OSA ON CPAP: ICD-10-CM

## 2022-09-19 DIAGNOSIS — I87.2 VENOUS INSUFFICIENCY (CHRONIC) (PERIPHERAL): ICD-10-CM

## 2022-09-19 DIAGNOSIS — E11.8 TYPE 2 DIABETES MELLITUS WITH COMPLICATION, WITHOUT LONG-TERM CURRENT USE OF INSULIN (HCC): ICD-10-CM

## 2022-09-19 RX ORDER — CYCLOBENZAPRINE HCL 10 MG
TABLET ORAL
Qty: 30 TABLET | Refills: 0 | Status: SHIPPED | OUTPATIENT
Start: 2022-09-19 | End: 2022-10-12

## 2022-09-19 NOTE — TELEPHONE ENCOUNTER
Holly Cross is requesting a refill on the following medication(s):  Requested Prescriptions     Pending Prescriptions Disp Refills    cyclobenzaprine (FLEXERIL) 10 MG tablet [Pharmacy Med Name: Cyclobenzaprine HCl 10 MG Oral Tablet] 30 tablet 0     Sig: Take 1 tablet by mouth three times daily as needed for muscle spasm       Last Visit Date (If Applicable):  0/0/8452    Next Visit Date:    11/8/2022

## 2022-10-12 DIAGNOSIS — M26.629 TEMPOROMANDIBULAR JOINT-PAIN-DYSFUNCTION SYNDROME (TMJ): ICD-10-CM

## 2022-10-12 DIAGNOSIS — E78.2 MIXED HYPERLIPIDEMIA: ICD-10-CM

## 2022-10-12 DIAGNOSIS — M16.11 PRIMARY OSTEOARTHRITIS OF RIGHT HIP: ICD-10-CM

## 2022-10-12 DIAGNOSIS — E66.01 MORBID OBESITY (HCC): ICD-10-CM

## 2022-10-12 DIAGNOSIS — G47.33 OSA ON CPAP: ICD-10-CM

## 2022-10-12 DIAGNOSIS — M19.90 ARTHRITIS: ICD-10-CM

## 2022-10-12 DIAGNOSIS — E11.8 TYPE 2 DIABETES MELLITUS WITH COMPLICATION, WITHOUT LONG-TERM CURRENT USE OF INSULIN (HCC): ICD-10-CM

## 2022-10-12 DIAGNOSIS — J42 CHRONIC BRONCHITIS, UNSPECIFIED CHRONIC BRONCHITIS TYPE (HCC): ICD-10-CM

## 2022-10-12 DIAGNOSIS — Z99.89 OSA ON CPAP: ICD-10-CM

## 2022-10-12 DIAGNOSIS — M62.830 BACK MUSCLE SPASM: ICD-10-CM

## 2022-10-12 DIAGNOSIS — I10 ESSENTIAL HYPERTENSION: ICD-10-CM

## 2022-10-12 DIAGNOSIS — I87.2 VENOUS INSUFFICIENCY (CHRONIC) (PERIPHERAL): ICD-10-CM

## 2022-10-12 DIAGNOSIS — I89.0 LYMPHEDEMA OF BOTH LOWER EXTREMITIES: ICD-10-CM

## 2022-10-12 DIAGNOSIS — F17.200 SMOKING: ICD-10-CM

## 2022-10-12 DIAGNOSIS — R21 RASH OF BACK: ICD-10-CM

## 2022-10-12 RX ORDER — CYCLOBENZAPRINE HCL 10 MG
TABLET ORAL
Qty: 30 TABLET | Refills: 0 | Status: SHIPPED | OUTPATIENT
Start: 2022-10-12 | End: 2022-10-31

## 2022-10-12 NOTE — TELEPHONE ENCOUNTER
Amador Bolton is requesting a refill on the following medication(s):  Requested Prescriptions     Pending Prescriptions Disp Refills    cyclobenzaprine (FLEXERIL) 10 MG tablet [Pharmacy Med Name: Cyclobenzaprine HCl 10 MG Oral Tablet] 30 tablet 0     Sig: Take 1 tablet by mouth three times daily as needed for muscle spasm       Last Visit Date (If Applicable):  3/3/7942    Next Visit Date:    11/8/2022

## 2022-10-24 ENCOUNTER — TELEPHONE (OUTPATIENT)
Dept: PHARMACY | Facility: CLINIC | Age: 57
End: 2022-10-24

## 2022-10-24 NOTE — TELEPHONE ENCOUNTER
[facility] CLINICAL PHARMACY: ADHERENCE REVIEW  Identified care gap per [employer]: fills at [facility]: ACE/ARB and Statin adherence    Last Visit: 8/8/22    Patient not found in Outcomes [facility]    [address] Records claims through 10/8/22 (Prior Year Yoseph Osorio =  n/a%; YTD Yoseph Osorio = Filled only once; Potential Fail Date: 11/25/22 ):   Lisinopril last filled on 7/14/22 for 100 day supply. Next refill due: 10/22/22    Per [address]:   Lisinopril last picked up on 10/19/22 for 100 day supply. Billed through [employer]     BP Readings from Last 3 Encounters:   08/08/22 132/84   07/27/22 132/80   06/08/22 128/76     Estimated Creatinine Clearance: 157 mL/min (based on SCr of 0.9 mg/dL). DIABETES     Lab Results   Component Value Date    LABA1C 6.2 08/08/2022    LABA1C 13.1 03/17/2022    LABA1C 7.0 06/14/2021     NOTE A1c <9%    STATIN ADHERENCE    Insurance Records claims through 10/8/22 (Prior Year PDC =  99%; YTD Yoseph Osorio =  77%; Potential Fail Date: 11/2/22 ):   Atorvastatin 20 mg tablets last filled on 7/14/22 for 100 day supply. Next refill due: 10/22/22    Per [address]:   Atorvastatin last picked up on 10/19/22 for 100 day supply. Billed through [facility]   Component Value Date    CHOL 170 08/19/2022    TRIG 179 08/19/2022    HDL 35 (L) 08/19/2022    LDLCHOLESTEROL 67 08/07/2020    LDLCALC 99.2 08/19/2022     ALT   Date Value Ref Range Status   08/19/2022 39 4 - 50 units/L Final     AST   Date Value Ref Range Status   08/19/2022 35 17 - 59 units/L Final     The 10-year ASCVD risk score (Dorian QUIJANO, et al., 2019) is: 28.4%    Values used to calculate the score:      Age: 62 years      Sex: Male      Is Non- : No      Diabetic: Yes      Tobacco smoker: Yes      Systolic Blood Pressure: 681 mmHg      Is BP treated: Yes      HDL Cholesterol: 35 mg/dL      Total Cholesterol: 170 mg/dL     PLAN  No patient out reach planned at this time.       Future Appointments   Date Time Provider Elisa Valencia   11/8/2022  8:15 AM IONA Banks - CNP [facility]   11/9/2022  2:00 PM Renzo Colindres DPM [facility]       Amy Fan, PharmD, formerly [facility], [address]   Department, toll free: [phone], option 1    For [facility] in place:  No  Gap Closed?: Yes   Time Spent (min): 5

## 2022-10-29 DIAGNOSIS — M62.830 BACK MUSCLE SPASM: ICD-10-CM

## 2022-10-29 DIAGNOSIS — E78.2 MIXED HYPERLIPIDEMIA: ICD-10-CM

## 2022-10-29 DIAGNOSIS — J42 CHRONIC BRONCHITIS, UNSPECIFIED CHRONIC BRONCHITIS TYPE (HCC): ICD-10-CM

## 2022-10-29 DIAGNOSIS — G47.33 OSA ON CPAP: ICD-10-CM

## 2022-10-29 DIAGNOSIS — R21 RASH OF BACK: ICD-10-CM

## 2022-10-29 DIAGNOSIS — F17.200 SMOKING: ICD-10-CM

## 2022-10-29 DIAGNOSIS — I87.2 VENOUS INSUFFICIENCY (CHRONIC) (PERIPHERAL): ICD-10-CM

## 2022-10-29 DIAGNOSIS — E11.8 TYPE 2 DIABETES MELLITUS WITH COMPLICATION, WITHOUT LONG-TERM CURRENT USE OF INSULIN (HCC): ICD-10-CM

## 2022-10-29 DIAGNOSIS — M26.629 TEMPOROMANDIBULAR JOINT-PAIN-DYSFUNCTION SYNDROME (TMJ): ICD-10-CM

## 2022-10-29 DIAGNOSIS — M16.11 PRIMARY OSTEOARTHRITIS OF RIGHT HIP: ICD-10-CM

## 2022-10-29 DIAGNOSIS — E66.01 MORBID OBESITY (HCC): ICD-10-CM

## 2022-10-29 DIAGNOSIS — I10 ESSENTIAL HYPERTENSION: ICD-10-CM

## 2022-10-29 DIAGNOSIS — M19.90 ARTHRITIS: ICD-10-CM

## 2022-10-29 DIAGNOSIS — Z99.89 OSA ON CPAP: ICD-10-CM

## 2022-10-29 DIAGNOSIS — I89.0 LYMPHEDEMA OF BOTH LOWER EXTREMITIES: ICD-10-CM

## 2022-10-31 RX ORDER — CYCLOBENZAPRINE HCL 10 MG
TABLET ORAL
Qty: 30 TABLET | Refills: 0 | Status: SHIPPED | OUTPATIENT
Start: 2022-10-31 | End: 2022-11-21

## 2022-10-31 NOTE — TELEPHONE ENCOUNTER
Chrystal Duval is requesting a refill on the following medication(s):  Requested Prescriptions     Pending Prescriptions Disp Refills    cyclobenzaprine (FLEXERIL) 10 MG tablet [Pharmacy Med Name: Cyclobenzaprine HCl 10 MG Oral Tablet] 30 tablet 0     Sig: Take 1 tablet by mouth three times daily as needed for muscle spasm       Last Visit Date (If Applicable):  6/9/2518    Next Visit Date:    11/8/2022

## 2022-11-03 DIAGNOSIS — L97.921 SKIN ULCER OF LEFT LOWER LEG, LIMITED TO BREAKDOWN OF SKIN (HCC): ICD-10-CM

## 2022-11-03 DIAGNOSIS — F17.200 SMOKER: ICD-10-CM

## 2022-11-03 DIAGNOSIS — J42 CHRONIC BRONCHITIS, UNSPECIFIED CHRONIC BRONCHITIS TYPE (HCC): ICD-10-CM

## 2022-11-03 DIAGNOSIS — K21.9 GASTROESOPHAGEAL REFLUX DISEASE WITHOUT ESOPHAGITIS: ICD-10-CM

## 2022-11-03 DIAGNOSIS — I89.0 LYMPHEDEMA OF BOTH LOWER EXTREMITIES: ICD-10-CM

## 2022-11-03 DIAGNOSIS — I87.2 VENOUS INSUFFICIENCY (CHRONIC) (PERIPHERAL): ICD-10-CM

## 2022-11-03 DIAGNOSIS — E66.01 MORBID OBESITY (HCC): ICD-10-CM

## 2022-11-03 DIAGNOSIS — Z87.11 HISTORY OF PEPTIC ULCER DISEASE: ICD-10-CM

## 2022-11-03 DIAGNOSIS — R11.10 DRY HEAVES: ICD-10-CM

## 2022-11-03 RX ORDER — OMEPRAZOLE 40 MG/1
CAPSULE, DELAYED RELEASE ORAL
Qty: 90 CAPSULE | Refills: 1 | Status: SHIPPED | OUTPATIENT
Start: 2022-11-03

## 2022-11-03 NOTE — TELEPHONE ENCOUNTER
Festus Fall is requesting a refill on the following medication(s):  Requested Prescriptions     Pending Prescriptions Disp Refills    omeprazole (PRILOSEC) 40 MG delayed release capsule [Pharmacy Med Name: Omeprazole 40 MG Oral Capsule Delayed Release] 90 capsule 1     Sig: TAKE 1 CAPSULE BY MOUTH ONCE DAILY IN THE MORNING BEFORE BREAKFAST       Last Visit Date (If Applicable):  3/5/4331    Next Visit Date:    11/8/2022

## 2022-11-08 ENCOUNTER — OFFICE VISIT (OUTPATIENT)
Dept: FAMILY MEDICINE CLINIC | Age: 57
End: 2022-11-08
Payer: MEDICARE

## 2022-11-08 VITALS
OXYGEN SATURATION: 100 % | SYSTOLIC BLOOD PRESSURE: 136 MMHG | RESPIRATION RATE: 16 BRPM | HEART RATE: 90 BPM | BODY MASS INDEX: 42.66 KG/M2 | DIASTOLIC BLOOD PRESSURE: 84 MMHG | WEIGHT: 315 LBS | HEIGHT: 72 IN

## 2022-11-08 DIAGNOSIS — E13.69 OTHER SPECIFIED DIABETES MELLITUS WITH OTHER SPECIFIED COMPLICATION, UNSPECIFIED WHETHER LONG TERM INSULIN USE (HCC): Primary | ICD-10-CM

## 2022-11-08 DIAGNOSIS — Z79.4 TYPE 2 DIABETES MELLITUS WITH DIABETIC POLYNEUROPATHY, WITH LONG-TERM CURRENT USE OF INSULIN (HCC): ICD-10-CM

## 2022-11-08 DIAGNOSIS — E11.42 TYPE 2 DIABETES MELLITUS WITH DIABETIC POLYNEUROPATHY, WITH LONG-TERM CURRENT USE OF INSULIN (HCC): ICD-10-CM

## 2022-11-08 LAB — HBA1C MFR BLD: 6.6 %

## 2022-11-08 PROCEDURE — 99213 OFFICE O/P EST LOW 20 MIN: CPT

## 2022-11-08 PROCEDURE — 83036 HEMOGLOBIN GLYCOSYLATED A1C: CPT

## 2022-11-08 PROCEDURE — PBSHW POCT GLYCOSYLATED HEMOGLOBIN (HGB A1C)

## 2022-11-08 PROCEDURE — 3078F DIAST BP <80 MM HG: CPT

## 2022-11-08 PROCEDURE — 99212 OFFICE O/P EST SF 10 MIN: CPT

## 2022-11-08 PROCEDURE — 3074F SYST BP LT 130 MM HG: CPT

## 2022-11-08 PROCEDURE — 3044F HG A1C LEVEL LT 7.0%: CPT

## 2022-11-08 RX ORDER — INSULIN GLARGINE 100 [IU]/ML
INJECTION, SOLUTION SUBCUTANEOUS
Qty: 15 ML | Refills: 5
Start: 2022-11-08

## 2022-11-08 ASSESSMENT — ENCOUNTER SYMPTOMS
EYE DISCHARGE: 0
ABDOMINAL PAIN: 0
RHINORRHEA: 0
CONSTIPATION: 0
COLOR CHANGE: 0
BACK PAIN: 0
WHEEZING: 0
NAUSEA: 0
SINUS PRESSURE: 0
CHEST TIGHTNESS: 0
SHORTNESS OF BREATH: 0
DIARRHEA: 0
COUGH: 0
SINUS PAIN: 0
EYE ITCHING: 0

## 2022-11-08 NOTE — ASSESSMENT & PLAN NOTE
At goal, continue current medications and continue current treatment plan A1c today 6.6. Continue current medications, Lantus 60 5 in the morning, 25 units in the night. Does report some hypoglycemic events with this per Dexcom continuous glucose monitor.   Moving forward we will decrease Lantus to 60 units at morning, 20 units in the night good minus

## 2022-11-08 NOTE — PROGRESS NOTES
Blanca Wallace (:  1965) is a 62 y.o. male,Established patient, here for evaluation of the following chief complaint(s):  Hypertension (Pt is here for a 3 mo f/u. He got labs done on 22. He has no concerns. )         ASSESSMENT/PLAN:  1. Other specified diabetes mellitus with other specified complication, unspecified whether long term insulin use ([address]  Assessment & Plan:   At goal, continue current medications and continue current treatment plan A1c today 6.6. Continue current medications, Lantus 60 5 in the morning, 25 units in the night. Does report some hypoglycemic events with this per Dexcom continuous glucose monitor. Moving forward we will decrease Lantus to 60 units at morning, 20 units in the night  Orders:  -     POCT Hb A1C (glycosylated hemoglobin)  -     BASAGLAR KWIKPEN 100 UNIT/ML injection pen; Inject 60 units subcutaneously in the morning and 20 units at bedtime. , Disp-15 mL, R-5, DAWAdjust Sig  2. Type 2 diabetes mellitus with diabetic polyneuropathy, with long-term current use of insulin ([address]  Assessment & Plan:   At goal, continue current medications and continue current treatment plan A1c today 6.6. Continue current medications, Lantus 60 5 in the morning, 25 units in the night. Does report some hypoglycemic events with this per Dexcom continuous glucose monitor. Moving forward we will decrease Lantus to 60 units at morning, 20 units in the night  Orders:  -     BASAGLAR KWIKPEN 100 UNIT/ML injection pen; Inject 60 units subcutaneously in the morning and 20 units at bedtime. , Disp-15 mL, R-5, DAWAdjust Sig      Return in about 3 months (around 2023), or if symptoms worsen or fail to improve. Subjective   SUBJECTIVE/OBJECTIVE:  [place] is here today for a diabetic follow-up. A1c is still within goal, denies any complaints other than hypoglycemia with new his insulin prescription from [facility].   States that his Dexcom has been off multiple times at night alarming him of hypoglycemic events. A1c in office today is still within goal.  Titration of insulin down is discussed at length, he will continue current insulin dosages for sliding scale, decrease Basaglar down to 60 units in the morning, 2 20 units at night and we will continue to titrate this down as his diet improves. Denies any other questions or needs at this time. Review of Systems   Constitutional:  Negative for chills, fatigue, fever and unexpected weight change. HENT:  Negative for congestion, ear pain, rhinorrhea, sinus pressure and sinus pain. Eyes:  Negative for discharge, itching and visual disturbance. Respiratory:  Negative for cough, chest tightness, shortness of breath and wheezing. Cardiovascular:  Negative for chest pain, palpitations and leg swelling. Gastrointestinal:  Negative for abdominal pain, constipation, diarrhea and nausea. Endocrine: Negative for cold intolerance, heat intolerance, polydipsia and polyphagia. Genitourinary:  Negative for dysuria, flank pain and frequency. Musculoskeletal:  Positive for arthralgias. Negative for back pain and myalgias. Skin:  Negative for color change. Allergic/Immunologic: Negative for environmental allergies and food allergies. Neurological:  Negative for dizziness, weakness, light-headedness, numbness and headaches. Psychiatric/Behavioral:  Negative for agitation, behavioral problems and confusion. The patient is not nervous/anxious. Objective   Physical Exam  Vitals and nursing note reviewed. Constitutional:       General: He is not in acute distress. Appearance: Normal appearance. He is not ill-appearing. HENT:      Head: Normocephalic and atraumatic. Right Ear: Tympanic membrane and external ear normal.      Left Ear: Tympanic membrane and external ear normal.      Nose: Nose normal. No congestion or rhinorrhea. Mouth/Throat:      Mouth: Mucous membranes are moist.      Pharynx: Oropharynx is clear.  No posterior oropharyngeal erythema. Eyes:      Pupils: Pupils are equal, round, and reactive to light. Cardiovascular:      Rate and Rhythm: Normal rate and regular rhythm. Pulses: Normal pulses. Heart sounds: Normal heart sounds. Pulmonary:      Effort: Pulmonary effort is normal.      Breath sounds: Normal breath sounds. No wheezing or rhonchi. Abdominal:      General: Bowel sounds are normal.      Palpations: There is no mass. Tenderness: There is no abdominal tenderness. Musculoskeletal:         General: No swelling or tenderness. Normal range of motion. Cervical back: Normal range of motion. No rigidity or tenderness. Skin:     General: Skin is warm and dry. Capillary Refill: Capillary refill takes less than 2 seconds. Coloration: Skin is not pale. Findings: No erythema. Neurological:      General: No focal deficit present. Mental Status: He is alert and oriented to person, place, and time. Psychiatric:         Mood and Affect: Mood normal.         Behavior: Behavior normal.         Thought Content: Thought content normal.         Judgment: Judgment normal.                An electronic signature was used to authenticate this note.     --Lori Sosa, APRN - CNP

## 2022-11-20 DIAGNOSIS — G47.33 OSA ON CPAP: ICD-10-CM

## 2022-11-20 DIAGNOSIS — I10 ESSENTIAL HYPERTENSION: ICD-10-CM

## 2022-11-20 DIAGNOSIS — E11.8 TYPE 2 DIABETES MELLITUS WITH COMPLICATION, WITHOUT LONG-TERM CURRENT USE OF INSULIN (HCC): ICD-10-CM

## 2022-11-20 DIAGNOSIS — E78.2 MIXED HYPERLIPIDEMIA: ICD-10-CM

## 2022-11-20 DIAGNOSIS — R21 RASH OF BACK: ICD-10-CM

## 2022-11-20 DIAGNOSIS — I89.0 LYMPHEDEMA OF BOTH LOWER EXTREMITIES: ICD-10-CM

## 2022-11-20 DIAGNOSIS — M16.11 PRIMARY OSTEOARTHRITIS OF RIGHT HIP: ICD-10-CM

## 2022-11-20 DIAGNOSIS — F17.200 SMOKING: ICD-10-CM

## 2022-11-20 DIAGNOSIS — M26.629 TEMPOROMANDIBULAR JOINT-PAIN-DYSFUNCTION SYNDROME (TMJ): ICD-10-CM

## 2022-11-20 DIAGNOSIS — I87.2 VENOUS INSUFFICIENCY (CHRONIC) (PERIPHERAL): ICD-10-CM

## 2022-11-20 DIAGNOSIS — Z99.89 OSA ON CPAP: ICD-10-CM

## 2022-11-20 DIAGNOSIS — M19.90 ARTHRITIS: ICD-10-CM

## 2022-11-20 DIAGNOSIS — M62.830 BACK MUSCLE SPASM: ICD-10-CM

## 2022-11-20 DIAGNOSIS — J42 CHRONIC BRONCHITIS, UNSPECIFIED CHRONIC BRONCHITIS TYPE (HCC): ICD-10-CM

## 2022-11-20 DIAGNOSIS — E66.01 MORBID OBESITY (HCC): ICD-10-CM

## 2022-11-21 RX ORDER — CYCLOBENZAPRINE HCL 10 MG
TABLET ORAL
Qty: 30 TABLET | Refills: 0 | Status: SHIPPED | OUTPATIENT
Start: 2022-11-21

## 2022-11-21 NOTE — TELEPHONE ENCOUNTER
Maia Orozco is requesting a refill on the following medication(s):  Requested Prescriptions     Pending Prescriptions Disp Refills    cyclobenzaprine (FLEXERIL) 10 MG tablet [Pharmacy Med Name: Cyclobenzaprine HCl 10 MG Oral Tablet] 30 tablet 0     Sig: Take 1 tablet by mouth three times daily as needed for muscle spasm       Last Visit Date (If Applicable):  46/1/5491    Next Visit Date:    2/8/2023

## 2022-12-09 ENCOUNTER — TELEPHONE (OUTPATIENT)
Dept: FAMILY MEDICINE CLINIC | Age: 57
End: 2022-12-09

## 2022-12-09 NOTE — TELEPHONE ENCOUNTER
Pt is catching a cold and pt wife was considered about his diabetes with the cold medicines having a lot of sugar. Pt wife was wondering about the new NyQuil that doesn't have sugar in it. Pt wife was wondering what she could give him. Please advise.

## 2022-12-09 NOTE — TELEPHONE ENCOUNTER
Appears diabetes is well controlled, last A1C 6.6. Short term use of the Nyquil is okay.  May want to try Mucinex in place of the Nyquil with increased fluids and rest. Follow up with PCP or urgent care if symptoms persist.

## 2022-12-12 DIAGNOSIS — I10 ESSENTIAL HYPERTENSION: ICD-10-CM

## 2022-12-12 DIAGNOSIS — M26.629 TEMPOROMANDIBULAR JOINT-PAIN-DYSFUNCTION SYNDROME (TMJ): ICD-10-CM

## 2022-12-12 DIAGNOSIS — Z99.89 OSA ON CPAP: ICD-10-CM

## 2022-12-12 DIAGNOSIS — R21 RASH OF BACK: ICD-10-CM

## 2022-12-12 DIAGNOSIS — G47.33 OSA ON CPAP: ICD-10-CM

## 2022-12-12 DIAGNOSIS — I89.0 LYMPHEDEMA OF BOTH LOWER EXTREMITIES: ICD-10-CM

## 2022-12-12 DIAGNOSIS — M62.830 BACK MUSCLE SPASM: ICD-10-CM

## 2022-12-12 DIAGNOSIS — M19.90 ARTHRITIS: ICD-10-CM

## 2022-12-12 DIAGNOSIS — I87.2 VENOUS INSUFFICIENCY (CHRONIC) (PERIPHERAL): ICD-10-CM

## 2022-12-12 DIAGNOSIS — J42 CHRONIC BRONCHITIS, UNSPECIFIED CHRONIC BRONCHITIS TYPE (HCC): ICD-10-CM

## 2022-12-12 DIAGNOSIS — M16.11 PRIMARY OSTEOARTHRITIS OF RIGHT HIP: ICD-10-CM

## 2022-12-12 DIAGNOSIS — E11.8 TYPE 2 DIABETES MELLITUS WITH COMPLICATION, WITHOUT LONG-TERM CURRENT USE OF INSULIN (HCC): ICD-10-CM

## 2022-12-12 DIAGNOSIS — F17.200 SMOKING: ICD-10-CM

## 2022-12-12 DIAGNOSIS — E78.2 MIXED HYPERLIPIDEMIA: ICD-10-CM

## 2022-12-12 DIAGNOSIS — E66.01 MORBID OBESITY (HCC): ICD-10-CM

## 2022-12-12 RX ORDER — CYCLOBENZAPRINE HCL 10 MG
TABLET ORAL
Qty: 30 TABLET | Refills: 0 | Status: SHIPPED | OUTPATIENT
Start: 2022-12-12

## 2022-12-12 NOTE — TELEPHONE ENCOUNTER
Cintia Gould is requesting a refill on the following medication(s):  Requested Prescriptions     Pending Prescriptions Disp Refills    cyclobenzaprine (FLEXERIL) 10 MG tablet [Pharmacy Med Name: Cyclobenzaprine HCl 10 MG Oral Tablet] 30 tablet 0     Sig: Take 1 tablet by mouth three times daily as needed for muscle spasm       Last Visit Date (If Applicable):  01/8/8366    Next Visit Date:    2/8/2023

## 2022-12-30 DIAGNOSIS — G47.33 OSA ON CPAP: ICD-10-CM

## 2022-12-30 DIAGNOSIS — Z99.89 OSA ON CPAP: ICD-10-CM

## 2022-12-30 DIAGNOSIS — M26.629 TEMPOROMANDIBULAR JOINT-PAIN-DYSFUNCTION SYNDROME (TMJ): ICD-10-CM

## 2022-12-30 DIAGNOSIS — J42 CHRONIC BRONCHITIS, UNSPECIFIED CHRONIC BRONCHITIS TYPE (HCC): ICD-10-CM

## 2022-12-30 DIAGNOSIS — I89.0 LYMPHEDEMA OF BOTH LOWER EXTREMITIES: ICD-10-CM

## 2022-12-30 DIAGNOSIS — M62.830 BACK MUSCLE SPASM: ICD-10-CM

## 2022-12-30 DIAGNOSIS — E66.01 MORBID OBESITY (HCC): ICD-10-CM

## 2022-12-30 DIAGNOSIS — I10 ESSENTIAL HYPERTENSION: ICD-10-CM

## 2022-12-30 DIAGNOSIS — I87.2 VENOUS INSUFFICIENCY (CHRONIC) (PERIPHERAL): ICD-10-CM

## 2022-12-30 DIAGNOSIS — E11.8 TYPE 2 DIABETES MELLITUS WITH COMPLICATION, WITHOUT LONG-TERM CURRENT USE OF INSULIN (HCC): ICD-10-CM

## 2022-12-30 DIAGNOSIS — M16.11 PRIMARY OSTEOARTHRITIS OF RIGHT HIP: ICD-10-CM

## 2022-12-30 DIAGNOSIS — F17.200 SMOKING: ICD-10-CM

## 2022-12-30 DIAGNOSIS — E78.2 MIXED HYPERLIPIDEMIA: ICD-10-CM

## 2022-12-30 DIAGNOSIS — M19.90 ARTHRITIS: ICD-10-CM

## 2022-12-30 DIAGNOSIS — R21 RASH OF BACK: ICD-10-CM

## 2022-12-30 NOTE — TELEPHONE ENCOUNTER
Marino Abraham is requesting a refill on the following medication(s):  Requested Prescriptions     Pending Prescriptions Disp Refills    cyclobenzaprine (FLEXERIL) 10 MG tablet [Pharmacy Med Name: Cyclobenzaprine HCl 10 MG Oral Tablet] 30 tablet 0     Sig: Take 1 tablet by mouth three times daily as needed for muscle spasm       Last Visit Date (If Applicable):  66/7/6281    Next Visit Date:    2/8/2023

## 2023-01-03 RX ORDER — CYCLOBENZAPRINE HCL 10 MG
TABLET ORAL
Qty: 30 TABLET | Refills: 0 | Status: SHIPPED | OUTPATIENT
Start: 2023-01-03

## 2023-01-14 DIAGNOSIS — M16.11 PRIMARY OSTEOARTHRITIS OF RIGHT HIP: ICD-10-CM

## 2023-01-14 DIAGNOSIS — I89.0 LYMPHEDEMA OF BOTH LOWER EXTREMITIES: ICD-10-CM

## 2023-01-14 DIAGNOSIS — R21 RASH OF BACK: ICD-10-CM

## 2023-01-14 DIAGNOSIS — M26.629 TEMPOROMANDIBULAR JOINT-PAIN-DYSFUNCTION SYNDROME (TMJ): ICD-10-CM

## 2023-01-14 DIAGNOSIS — I10 ESSENTIAL HYPERTENSION: ICD-10-CM

## 2023-01-14 DIAGNOSIS — E11.8 TYPE 2 DIABETES MELLITUS WITH COMPLICATION, WITHOUT LONG-TERM CURRENT USE OF INSULIN (HCC): ICD-10-CM

## 2023-01-14 DIAGNOSIS — Z99.89 OSA ON CPAP: ICD-10-CM

## 2023-01-14 DIAGNOSIS — G47.33 OSA ON CPAP: ICD-10-CM

## 2023-01-14 DIAGNOSIS — J42 CHRONIC BRONCHITIS, UNSPECIFIED CHRONIC BRONCHITIS TYPE (HCC): ICD-10-CM

## 2023-01-14 DIAGNOSIS — I87.2 VENOUS INSUFFICIENCY (CHRONIC) (PERIPHERAL): ICD-10-CM

## 2023-01-14 DIAGNOSIS — E78.2 MIXED HYPERLIPIDEMIA: ICD-10-CM

## 2023-01-14 DIAGNOSIS — F17.200 SMOKING: ICD-10-CM

## 2023-01-14 DIAGNOSIS — E66.01 MORBID OBESITY (HCC): ICD-10-CM

## 2023-01-14 DIAGNOSIS — M62.830 BACK MUSCLE SPASM: ICD-10-CM

## 2023-01-14 DIAGNOSIS — M19.90 ARTHRITIS: ICD-10-CM

## 2023-01-16 RX ORDER — CYCLOBENZAPRINE HCL 10 MG
TABLET ORAL
Qty: 30 TABLET | Refills: 0 | OUTPATIENT
Start: 2023-01-16

## 2023-01-23 DIAGNOSIS — J42 CHRONIC BRONCHITIS, UNSPECIFIED CHRONIC BRONCHITIS TYPE (HCC): ICD-10-CM

## 2023-01-23 DIAGNOSIS — E78.2 MIXED HYPERLIPIDEMIA: ICD-10-CM

## 2023-01-23 DIAGNOSIS — M26.629 TEMPOROMANDIBULAR JOINT-PAIN-DYSFUNCTION SYNDROME (TMJ): ICD-10-CM

## 2023-01-23 DIAGNOSIS — Z99.89 OSA ON CPAP: ICD-10-CM

## 2023-01-23 DIAGNOSIS — G47.33 OSA ON CPAP: ICD-10-CM

## 2023-01-23 DIAGNOSIS — I89.0 LYMPHEDEMA OF BOTH LOWER EXTREMITIES: ICD-10-CM

## 2023-01-23 DIAGNOSIS — M16.11 PRIMARY OSTEOARTHRITIS OF RIGHT HIP: ICD-10-CM

## 2023-01-23 DIAGNOSIS — M62.830 BACK MUSCLE SPASM: ICD-10-CM

## 2023-01-23 DIAGNOSIS — F17.200 SMOKING: ICD-10-CM

## 2023-01-23 DIAGNOSIS — I10 ESSENTIAL HYPERTENSION: ICD-10-CM

## 2023-01-23 DIAGNOSIS — R21 RASH OF BACK: ICD-10-CM

## 2023-01-23 DIAGNOSIS — M19.90 ARTHRITIS: ICD-10-CM

## 2023-01-23 DIAGNOSIS — E11.8 TYPE 2 DIABETES MELLITUS WITH COMPLICATION, WITHOUT LONG-TERM CURRENT USE OF INSULIN (HCC): ICD-10-CM

## 2023-01-23 DIAGNOSIS — I87.2 VENOUS INSUFFICIENCY (CHRONIC) (PERIPHERAL): ICD-10-CM

## 2023-01-23 DIAGNOSIS — E66.01 MORBID OBESITY (HCC): ICD-10-CM

## 2023-01-23 RX ORDER — CYCLOBENZAPRINE HCL 10 MG
TABLET ORAL
Qty: 30 TABLET | Refills: 0 | OUTPATIENT
Start: 2023-01-23

## 2023-01-27 DIAGNOSIS — G47.33 OSA ON CPAP: ICD-10-CM

## 2023-01-27 DIAGNOSIS — I89.0 LYMPHEDEMA OF BOTH LOWER EXTREMITIES: ICD-10-CM

## 2023-01-27 DIAGNOSIS — J42 CHRONIC BRONCHITIS, UNSPECIFIED CHRONIC BRONCHITIS TYPE (HCC): ICD-10-CM

## 2023-01-27 DIAGNOSIS — M26.629 TEMPOROMANDIBULAR JOINT-PAIN-DYSFUNCTION SYNDROME (TMJ): ICD-10-CM

## 2023-01-27 DIAGNOSIS — M62.830 BACK MUSCLE SPASM: ICD-10-CM

## 2023-01-27 DIAGNOSIS — M19.90 ARTHRITIS: ICD-10-CM

## 2023-01-27 DIAGNOSIS — I10 ESSENTIAL HYPERTENSION: ICD-10-CM

## 2023-01-27 DIAGNOSIS — I87.2 VENOUS INSUFFICIENCY (CHRONIC) (PERIPHERAL): ICD-10-CM

## 2023-01-27 DIAGNOSIS — Z99.89 OSA ON CPAP: ICD-10-CM

## 2023-01-27 DIAGNOSIS — E11.8 TYPE 2 DIABETES MELLITUS WITH COMPLICATION, WITHOUT LONG-TERM CURRENT USE OF INSULIN (HCC): ICD-10-CM

## 2023-01-27 DIAGNOSIS — M16.11 PRIMARY OSTEOARTHRITIS OF RIGHT HIP: ICD-10-CM

## 2023-01-27 DIAGNOSIS — E78.2 MIXED HYPERLIPIDEMIA: ICD-10-CM

## 2023-01-27 DIAGNOSIS — R21 RASH OF BACK: ICD-10-CM

## 2023-01-27 DIAGNOSIS — E66.01 MORBID OBESITY (HCC): ICD-10-CM

## 2023-01-27 DIAGNOSIS — F17.200 SMOKING: ICD-10-CM

## 2023-01-27 RX ORDER — CYCLOBENZAPRINE HCL 10 MG
TABLET ORAL
Qty: 30 TABLET | Refills: 0 | Status: SHIPPED | OUTPATIENT
Start: 2023-01-27

## 2023-01-27 RX ORDER — CYCLOBENZAPRINE HCL 10 MG
TABLET ORAL
Qty: 30 TABLET | Refills: 0 | OUTPATIENT
Start: 2023-01-27

## 2023-01-27 NOTE — TELEPHONE ENCOUNTER
Requested Prescriptions     Pending Prescriptions Disp Refills    cyclobenzaprine (FLEXERIL) 10 MG tablet 30 tablet 0     Refused Prescriptions Disp Refills    cyclobenzaprine (FLEXERIL) 10 MG tablet [Pharmacy Med Name: Cyclobenzaprine HCl 10 MG Oral Tablet] 30 tablet 0     Sig: Take 1 tablet by mouth three times daily as needed for muscle spasm     Refused By: Svitlana Pickard     Reason for Refusal: Duplicate Request      Pt wife called stating that pharmacy called stating that this rx won't be refilled because pt needs an appointment with PCP. Pt does have appointment with PCP on 2/8 for f/u. Please send refill. Thank you!

## 2023-01-30 DIAGNOSIS — L97.921 SKIN ULCER OF LEFT LOWER LEG, LIMITED TO BREAKDOWN OF SKIN (HCC): ICD-10-CM

## 2023-01-30 DIAGNOSIS — I89.0 LYMPHEDEMA OF BOTH LOWER EXTREMITIES: ICD-10-CM

## 2023-01-30 DIAGNOSIS — E66.01 MORBID OBESITY (HCC): ICD-10-CM

## 2023-01-30 DIAGNOSIS — F17.200 SMOKER: ICD-10-CM

## 2023-01-30 DIAGNOSIS — I87.2 VENOUS INSUFFICIENCY (CHRONIC) (PERIPHERAL): ICD-10-CM

## 2023-01-30 DIAGNOSIS — Z87.11 HISTORY OF PEPTIC ULCER DISEASE: ICD-10-CM

## 2023-01-30 DIAGNOSIS — J42 CHRONIC BRONCHITIS, UNSPECIFIED CHRONIC BRONCHITIS TYPE (HCC): ICD-10-CM

## 2023-01-30 DIAGNOSIS — R11.10 DRY HEAVES: ICD-10-CM

## 2023-01-30 DIAGNOSIS — K21.9 GASTROESOPHAGEAL REFLUX DISEASE WITHOUT ESOPHAGITIS: ICD-10-CM

## 2023-01-30 RX ORDER — FAMOTIDINE 20 MG/1
TABLET, FILM COATED ORAL
Qty: 180 TABLET | Refills: 0 | Status: SHIPPED | OUTPATIENT
Start: 2023-01-30

## 2023-01-30 NOTE — TELEPHONE ENCOUNTER
Anshu Mena is requesting a refill on the following medication(s):  Requested Prescriptions     Pending Prescriptions Disp Refills    famotidine (PEPCID) 20 MG tablet [Pharmacy Med Name: Famotidine 20 MG Oral Tablet] 180 tablet 0     Sig: Take 1 tablet by mouth twice daily       Last Visit Date (If Applicable):  57/9/7348    Next Visit Date:    2/8/2023

## 2023-02-08 ENCOUNTER — OFFICE VISIT (OUTPATIENT)
Dept: FAMILY MEDICINE CLINIC | Age: 58
End: 2023-02-08
Payer: MEDICARE

## 2023-02-08 VITALS
OXYGEN SATURATION: 97 % | BODY MASS INDEX: 42.66 KG/M2 | DIASTOLIC BLOOD PRESSURE: 88 MMHG | SYSTOLIC BLOOD PRESSURE: 136 MMHG | WEIGHT: 315 LBS | HEIGHT: 72 IN | HEART RATE: 84 BPM

## 2023-02-08 DIAGNOSIS — M79.89 LEFT LEG SWELLING: ICD-10-CM

## 2023-02-08 DIAGNOSIS — Z79.4 DIABETES MELLITUS DUE TO UNDERLYING CONDITION WITH DIABETIC PERIPHERAL ANGIOPATHY AND GANGRENE, WITH LONG-TERM CURRENT USE OF INSULIN (HCC): ICD-10-CM

## 2023-02-08 DIAGNOSIS — E13.9 DIABETES MELLITUS OF OTHER TYPE WITHOUT COMPLICATION, UNSPECIFIED WHETHER LONG TERM INSULIN USE (HCC): Primary | ICD-10-CM

## 2023-02-08 DIAGNOSIS — Z79.4 TYPE 2 DIABETES MELLITUS WITH DIABETIC PERIPHERAL ANGIOPATHY WITHOUT GANGRENE, WITH LONG-TERM CURRENT USE OF INSULIN (HCC): ICD-10-CM

## 2023-02-08 DIAGNOSIS — E11.42 TYPE 2 DIABETES MELLITUS WITH DIABETIC POLYNEUROPATHY, WITH LONG-TERM CURRENT USE OF INSULIN (HCC): ICD-10-CM

## 2023-02-08 DIAGNOSIS — E11.51 TYPE 2 DIABETES MELLITUS WITH DIABETIC PERIPHERAL ANGIOPATHY WITHOUT GANGRENE, WITH LONG-TERM CURRENT USE OF INSULIN (HCC): ICD-10-CM

## 2023-02-08 DIAGNOSIS — M79.662 PAIN OF LEFT LOWER LEG: ICD-10-CM

## 2023-02-08 DIAGNOSIS — Z79.4 TYPE 2 DIABETES MELLITUS WITH DIABETIC POLYNEUROPATHY, WITH LONG-TERM CURRENT USE OF INSULIN (HCC): ICD-10-CM

## 2023-02-08 DIAGNOSIS — E08.52 DIABETES MELLITUS DUE TO UNDERLYING CONDITION WITH DIABETIC PERIPHERAL ANGIOPATHY AND GANGRENE, WITH LONG-TERM CURRENT USE OF INSULIN (HCC): ICD-10-CM

## 2023-02-08 PROBLEM — L97.921 SKIN ULCER OF LEFT LOWER LEG, LIMITED TO BREAKDOWN OF SKIN (HCC): Status: RESOLVED | Noted: 2021-04-12 | Resolved: 2023-02-08

## 2023-02-08 PROBLEM — L03.119 CELLULITIS OF LOWER EXTREMITY: Status: RESOLVED | Noted: 2021-04-12 | Resolved: 2023-02-08

## 2023-02-08 LAB — HBA1C MFR BLD: 7.9 %

## 2023-02-08 PROCEDURE — 83036 HEMOGLOBIN GLYCOSYLATED A1C: CPT

## 2023-02-08 PROCEDURE — 99212 OFFICE O/P EST SF 10 MIN: CPT

## 2023-02-08 RX ORDER — DULAGLUTIDE 0.75 MG/.5ML
0.75 INJECTION, SOLUTION SUBCUTANEOUS WEEKLY
Qty: 4 ADJUSTABLE DOSE PRE-FILLED PEN SYRINGE | Refills: 0 | Status: SHIPPED | OUTPATIENT
Start: 2023-02-08

## 2023-02-08 SDOH — ECONOMIC STABILITY: INCOME INSECURITY: HOW HARD IS IT FOR YOU TO PAY FOR THE VERY BASICS LIKE FOOD, HOUSING, MEDICAL CARE, AND HEATING?: NOT HARD AT ALL

## 2023-02-08 SDOH — ECONOMIC STABILITY: HOUSING INSECURITY
IN THE LAST 12 MONTHS, WAS THERE A TIME WHEN YOU DID NOT HAVE A STEADY PLACE TO SLEEP OR SLEPT IN A SHELTER (INCLUDING NOW)?: NO

## 2023-02-08 SDOH — ECONOMIC STABILITY: FOOD INSECURITY: WITHIN THE PAST 12 MONTHS, YOU WORRIED THAT YOUR FOOD WOULD RUN OUT BEFORE YOU GOT MONEY TO BUY MORE.: NEVER TRUE

## 2023-02-08 SDOH — ECONOMIC STABILITY: FOOD INSECURITY: WITHIN THE PAST 12 MONTHS, THE FOOD YOU BOUGHT JUST DIDN'T LAST AND YOU DIDN'T HAVE MONEY TO GET MORE.: NEVER TRUE

## 2023-02-08 ASSESSMENT — ENCOUNTER SYMPTOMS
EYE DISCHARGE: 0
COUGH: 0
CHEST TIGHTNESS: 0
WHEEZING: 0
SHORTNESS OF BREATH: 0
BACK PAIN: 0
SINUS PAIN: 0
EYE ITCHING: 0
CONSTIPATION: 0
RHINORRHEA: 0
DIARRHEA: 0
SINUS PRESSURE: 0
ABDOMINAL PAIN: 0
COLOR CHANGE: 0
NAUSEA: 0

## 2023-02-08 ASSESSMENT — PATIENT HEALTH QUESTIONNAIRE - PHQ9
2. FEELING DOWN, DEPRESSED OR HOPELESS: 0
1. LITTLE INTEREST OR PLEASURE IN DOING THINGS: 0
SUM OF ALL RESPONSES TO PHQ QUESTIONS 1-9: 0
SUM OF ALL RESPONSES TO PHQ9 QUESTIONS 1 & 2: 0
SUM OF ALL RESPONSES TO PHQ QUESTIONS 1-9: 0

## 2023-02-08 NOTE — PROGRESS NOTES
Isa Rivera (:  1965) is a 62 y.o. male,Established patient, here for evaluation of the following chief complaint(s):  Diabetes (Pt is here for a 3 month follow up. He states that his left leg has been swollen and also having pain in the leg and foot. There is some discoloring to the leg and foot. He states there was an infection in it that put him in the hospital a couple of years ago. )         ASSESSMENT/PLAN:  1. Diabetes mellitus of other type without complication, unspecified whether long term insulin use ([address]  Assessment & Plan:   Uncontrolled, changes made today: A1c is above goal range. We will add Trulicity 4.34ER subcutaneous injections once weekly. Side effects this medication are discussed. Continue all other antidiabetic medications including long-acting and fast acting insulin. The goal would be to decrease need for insulin, and have better control of A1c. Chas verbalized understanding of this. and lifestyle modifications recommended  Orders:  -     POCT Hb A1C (glycosylated hemoglobin)  2. Pain of left lower leg  -     US DUP LOWER EXTREMITY LEFT ARTERIES; Future  -     US DUP LOWER EXTREMITY LEFT ALMA; Future  3. Type 2 diabetes mellitus with diabetic peripheral angiopathy without gangrene, with long-term current use of insulin ([address]  Assessment & Plan: Will order ultrasound of the left lower leg as he is having slightly decreased pulse, pain, swelling. Cap refill is within normal, will rule out DVT versus arterial occlusion. Pending results may need to see vascular. Does deny claudication however he states it is very difficult for him to ambulate far and he has not tried. Orders:  -     US DUP LOWER EXTREMITY LEFT ARTERIES; Future  -     US DUP LOWER EXTREMITY LEFT ALMA; Future  4. Type 2 diabetes mellitus with diabetic polyneuropathy, with long-term current use of insulin ([address]  -     US DUP LOWER EXTREMITY LEFT ARTERIES;  Future  -     US DUP LOWER EXTREMITY LEFT ALMA; Future  5. Left leg swelling  -     US DUP LOWER EXTREMITY LEFT ARTERIES; Future  -     US DUP LOWER EXTREMITY LEFT ALMA; Future  6. Diabetes mellitus due to underlying condition with diabetic peripheral angiopathy and gangrene, with long-term current use of insulin ([facility])   Assessment & Plan:   Uncontrolled, changes made today: A1c is above goal range. We will add Trulicity 0.85VM subcutaneous injections once weekly. Side effects this medication are discussed. Continue all other antidiabetic medications including long-acting and fast acting insulin. The goal would be to decrease need for insulin, and have better control of A1c. Chas verbalized understanding of this. and lifestyle modifications recommended  Orders:  -     US DUP LOWER EXTREMITY LEFT ARTERIES; Future  -     US DUP LOWER EXTREMITY LEFT ALMA; Future      Return in about 3 months (around 5/8/2023), or if symptoms worsen or fail to improve. Subjective   SUBJECTIVE/OBJECTIVE:  Rich Mckeon is here today for a follow-up visit. Does complain of left calf pain, foot pain. Able to sense 8 out of 10 locations with monofilament. This is with left leg, unable to tell distal portions of toes. Denies claudication, however with reports of pain I cannot exclude DVT. This is a been ongoing for several days. Denies any fever, chills, erythremia or warmth to the lower leg. He has bilateral edema, slightly worse on the left of his lower legs. This is been noted in the past as well. Review of Systems   Constitutional:  Negative for chills, fatigue, fever and unexpected weight change. HENT:  Negative for congestion, ear pain, rhinorrhea, sinus pressure and sinus pain. Eyes:  Negative for discharge, itching and visual disturbance. Respiratory:  Negative for cough, chest tightness, shortness of breath and wheezing. Cardiovascular:  Negative for chest pain, palpitations and leg swelling.    Gastrointestinal:  Negative for abdominal pain, constipation, diarrhea and nausea. Endocrine: Negative for cold intolerance, heat intolerance, polydipsia and polyphagia. Genitourinary:  Negative for dysuria, flank pain and frequency. Musculoskeletal:  Negative for arthralgias, back pain and myalgias. Skin:  Negative for color change. Allergic/Immunologic: Negative for environmental allergies and food allergies. Neurological:  Negative for dizziness, weakness, light-headedness, numbness and headaches. Psychiatric/Behavioral:  Negative for agitation, behavioral problems and confusion. The patient is not nervous/anxious. Objective   Physical Exam  Vitals and nursing note reviewed. Constitutional:       General: He is not in acute distress. Appearance: Normal appearance. He is not ill-appearing. HENT:      Head: Normocephalic and atraumatic. Right Ear: Tympanic membrane and external ear normal.      Left Ear: Tympanic membrane and external ear normal.      Nose: Nose normal. No congestion or rhinorrhea. Mouth/Throat:      Mouth: Mucous membranes are moist.      Pharynx: Oropharynx is clear. No posterior oropharyngeal erythema. Eyes:      Pupils: Pupils are equal, round, and reactive to light. Cardiovascular:      Rate and Rhythm: Normal rate and regular rhythm. Pulses:           Dorsalis pedis pulses are 2+ on the right side and 1+ on the left side. Posterior tibial pulses are 1+ on the left side. Heart sounds: Normal heart sounds. Pulmonary:      Effort: Pulmonary effort is normal.      Breath sounds: Normal breath sounds. No wheezing or rhonchi. Abdominal:      General: Bowel sounds are normal.      Palpations: There is no mass. Tenderness: There is no abdominal tenderness. Musculoskeletal:         General: No swelling or tenderness. Normal range of motion. Cervical back: Normal range of motion. No rigidity or tenderness. Right lower leg: Edema present. Left lower leg: Edema present.       Left foot: Normal range of motion. No bunion. Feet:      Right foot:      Skin integrity: Callus and dry skin present. No ulcer, blister or skin breakdown. Toenail Condition: Right toenails are abnormally thick. Left foot:      Protective Sensation: 10 sites tested. 8 sites sensed. Skin integrity: Callus and dry skin present. No ulcer, blister, skin breakdown, erythema, warmth or fissure. Toenail Condition: Left toenails are abnormally thick. Comments: Left foot pain base of foot plantar surface. Worse with ambulation. States that he thinks his foot has a bluish hue. It is somewhat darker than upper leg. Skin:     General: Skin is warm and dry. Capillary Refill: Capillary refill takes less than 2 seconds. Coloration: Skin is not pale. Findings: No erythema. Neurological:      General: No focal deficit present. Mental Status: He is alert and oriented to person, place, and time. Psychiatric:         Mood and Affect: Mood normal.         Behavior: Behavior normal.         Thought Content: Thought content normal.         Judgment: Judgment normal.                An electronic signature was used to authenticate this note.     --Mary Burrows, APRN - CNP

## 2023-02-08 NOTE — ASSESSMENT & PLAN NOTE
Uncontrolled, changes made today: A1c is above goal range. We will add Trulicity 1.07LP subcutaneous injections once weekly. Side effects this medication are discussed. Continue all other antidiabetic medications including long-acting and fast acting insulin. The goal would be to decrease need for insulin, and have better control of A1c. Chas verbalized understanding of this.  and lifestyle modifications recommended

## 2023-02-08 NOTE — ASSESSMENT & PLAN NOTE
Will order ultrasound of the left lower leg as he is having slightly decreased pulse, pain, swelling. Cap refill is within normal, will rule out DVT versus arterial occlusion. Pending results may need to see vascular. Does deny claudication however he states it is very difficult for him to ambulate far and he has not tried.

## 2023-02-13 DIAGNOSIS — E66.01 MORBID OBESITY (HCC): ICD-10-CM

## 2023-02-13 DIAGNOSIS — J42 CHRONIC BRONCHITIS, UNSPECIFIED CHRONIC BRONCHITIS TYPE (HCC): ICD-10-CM

## 2023-02-13 DIAGNOSIS — I87.2 VENOUS INSUFFICIENCY (CHRONIC) (PERIPHERAL): ICD-10-CM

## 2023-02-13 DIAGNOSIS — F17.200 SMOKING: ICD-10-CM

## 2023-02-13 DIAGNOSIS — M26.629 TEMPOROMANDIBULAR JOINT-PAIN-DYSFUNCTION SYNDROME (TMJ): ICD-10-CM

## 2023-02-13 DIAGNOSIS — M19.90 ARTHRITIS: ICD-10-CM

## 2023-02-13 DIAGNOSIS — M16.11 PRIMARY OSTEOARTHRITIS OF RIGHT HIP: ICD-10-CM

## 2023-02-13 DIAGNOSIS — I89.0 LYMPHEDEMA OF BOTH LOWER EXTREMITIES: ICD-10-CM

## 2023-02-13 DIAGNOSIS — R21 RASH OF BACK: ICD-10-CM

## 2023-02-13 DIAGNOSIS — M62.830 BACK MUSCLE SPASM: ICD-10-CM

## 2023-02-13 DIAGNOSIS — I10 ESSENTIAL HYPERTENSION: ICD-10-CM

## 2023-02-13 DIAGNOSIS — E78.2 MIXED HYPERLIPIDEMIA: ICD-10-CM

## 2023-02-13 DIAGNOSIS — G47.33 OSA ON CPAP: ICD-10-CM

## 2023-02-13 DIAGNOSIS — Z99.89 OSA ON CPAP: ICD-10-CM

## 2023-02-13 DIAGNOSIS — E11.8 TYPE 2 DIABETES MELLITUS WITH COMPLICATION, WITHOUT LONG-TERM CURRENT USE OF INSULIN (HCC): ICD-10-CM

## 2023-02-13 RX ORDER — CYCLOBENZAPRINE HCL 10 MG
TABLET ORAL
Qty: 30 TABLET | Refills: 0 | Status: SHIPPED | OUTPATIENT
Start: 2023-02-13

## 2023-02-13 NOTE — TELEPHONE ENCOUNTER
Chas Cooper is requesting a refill on the following medication(s):  Requested Prescriptions     Pending Prescriptions Disp Refills    cyclobenzaprine (FLEXERIL) 10 MG tablet [Pharmacy Med Name: Cyclobenzaprine HCl 10 MG Oral Tablet] 30 tablet 0     Sig: Take 1 tablet by mouth three times daily as needed for muscle spasm       Last Visit Date (If Applicable):  2/8/2023    Next Visit Date:    5/8/2023

## 2023-02-15 ENCOUNTER — TELEPHONE (OUTPATIENT)
Dept: FAMILY MEDICINE CLINIC | Age: 58
End: 2023-02-15

## 2023-02-15 NOTE — TELEPHONE ENCOUNTER
Pt wife called back stating that they received a phone call for results. I informed pt wife of results and pt wife voiced understanding.

## 2023-03-07 DIAGNOSIS — J42 CHRONIC BRONCHITIS, UNSPECIFIED CHRONIC BRONCHITIS TYPE (HCC): ICD-10-CM

## 2023-03-07 DIAGNOSIS — E11.8 TYPE 2 DIABETES MELLITUS WITH COMPLICATION, WITHOUT LONG-TERM CURRENT USE OF INSULIN (HCC): ICD-10-CM

## 2023-03-07 DIAGNOSIS — I89.0 LYMPHEDEMA OF BOTH LOWER EXTREMITIES: ICD-10-CM

## 2023-03-07 DIAGNOSIS — I87.2 VENOUS INSUFFICIENCY (CHRONIC) (PERIPHERAL): ICD-10-CM

## 2023-03-07 DIAGNOSIS — Z99.89 OSA ON CPAP: ICD-10-CM

## 2023-03-07 DIAGNOSIS — I10 ESSENTIAL HYPERTENSION: ICD-10-CM

## 2023-03-07 DIAGNOSIS — E66.01 MORBID OBESITY (HCC): ICD-10-CM

## 2023-03-07 DIAGNOSIS — M62.830 BACK MUSCLE SPASM: ICD-10-CM

## 2023-03-07 DIAGNOSIS — E78.2 MIXED HYPERLIPIDEMIA: ICD-10-CM

## 2023-03-07 DIAGNOSIS — M16.11 PRIMARY OSTEOARTHRITIS OF RIGHT HIP: ICD-10-CM

## 2023-03-07 DIAGNOSIS — M19.90 ARTHRITIS: ICD-10-CM

## 2023-03-07 DIAGNOSIS — G47.33 OSA ON CPAP: ICD-10-CM

## 2023-03-07 DIAGNOSIS — M26.629 TEMPOROMANDIBULAR JOINT-PAIN-DYSFUNCTION SYNDROME (TMJ): ICD-10-CM

## 2023-03-07 DIAGNOSIS — F17.200 SMOKING: ICD-10-CM

## 2023-03-07 DIAGNOSIS — R21 RASH OF BACK: ICD-10-CM

## 2023-03-07 RX ORDER — CYCLOBENZAPRINE HCL 10 MG
TABLET ORAL
Qty: 30 TABLET | Refills: 0 | Status: SHIPPED | OUTPATIENT
Start: 2023-03-07

## 2023-03-21 RX ORDER — DULAGLUTIDE 0.75 MG/.5ML
INJECTION, SOLUTION SUBCUTANEOUS
Qty: 4 ML | Refills: 0 | Status: SHIPPED | OUTPATIENT
Start: 2023-03-21

## 2023-03-21 NOTE — TELEPHONE ENCOUNTER
Parag Medina is requesting a refill on the following medication(s):  Requested Prescriptions     Pending Prescriptions Disp Refills    TREco Cuizine 0.63 EI/0.8RR SOPN [Pharmacy Med Name: Trulicity 9.75 BB/4.3GL Subcutaneous Solution Pen-injector] 4 mL 0     Sig: INJECT 1 SYRINGE SUBCUTANEOUSLY ONCE A WEEK       Last Visit Date (If Applicable):  6/7/6864    Next Visit Date:    5/8/2023

## 2023-03-29 DIAGNOSIS — M16.11 PRIMARY OSTEOARTHRITIS OF RIGHT HIP: ICD-10-CM

## 2023-03-29 DIAGNOSIS — Z99.89 OSA ON CPAP: ICD-10-CM

## 2023-03-29 DIAGNOSIS — F17.200 SMOKING: ICD-10-CM

## 2023-03-29 DIAGNOSIS — I87.2 VENOUS INSUFFICIENCY (CHRONIC) (PERIPHERAL): ICD-10-CM

## 2023-03-29 DIAGNOSIS — M19.90 ARTHRITIS: ICD-10-CM

## 2023-03-29 DIAGNOSIS — J42 CHRONIC BRONCHITIS, UNSPECIFIED CHRONIC BRONCHITIS TYPE (HCC): ICD-10-CM

## 2023-03-29 DIAGNOSIS — E66.01 MORBID OBESITY (HCC): ICD-10-CM

## 2023-03-29 DIAGNOSIS — I89.0 LYMPHEDEMA OF BOTH LOWER EXTREMITIES: ICD-10-CM

## 2023-03-29 DIAGNOSIS — E78.2 MIXED HYPERLIPIDEMIA: ICD-10-CM

## 2023-03-29 DIAGNOSIS — M62.830 BACK MUSCLE SPASM: ICD-10-CM

## 2023-03-29 DIAGNOSIS — I10 ESSENTIAL HYPERTENSION: ICD-10-CM

## 2023-03-29 DIAGNOSIS — R21 RASH OF BACK: ICD-10-CM

## 2023-03-29 DIAGNOSIS — M26.629 TEMPOROMANDIBULAR JOINT-PAIN-DYSFUNCTION SYNDROME (TMJ): ICD-10-CM

## 2023-03-29 DIAGNOSIS — E11.8 TYPE 2 DIABETES MELLITUS WITH COMPLICATION, WITHOUT LONG-TERM CURRENT USE OF INSULIN (HCC): ICD-10-CM

## 2023-03-29 DIAGNOSIS — G47.33 OSA ON CPAP: ICD-10-CM

## 2023-03-29 RX ORDER — CYCLOBENZAPRINE HCL 10 MG
TABLET ORAL
Qty: 30 TABLET | Refills: 0 | Status: SHIPPED | OUTPATIENT
Start: 2023-03-29

## 2023-03-29 NOTE — TELEPHONE ENCOUNTER
Please refill prescription for pt until jerrell get back.  Thank you       Iwona Batista is requesting a refill on the following medication(s):  Requested Prescriptions     Pending Prescriptions Disp Refills    cyclobenzaprine (FLEXERIL) 10 MG tablet [Pharmacy Med Name: Cyclobenzaprine HCl 10 MG Oral Tablet] 30 tablet 0     Sig: Take 1 tablet by mouth three times daily as needed for muscle spasm       Last Visit Date (If Applicable):  5/3/2447    Next Visit Date:    5/8/2023

## 2023-03-29 NOTE — TELEPHONE ENCOUNTER
Armando Fowler is requesting a refill on the following medication(s):  Requested Prescriptions     Pending Prescriptions Disp Refills    cyclobenzaprine (FLEXERIL) 10 MG tablet [Pharmacy Med Name: Cyclobenzaprine HCl 10 MG Oral Tablet] 30 tablet 0     Sig: Take 1 tablet by mouth three times daily as needed for muscle spasm       Last Visit Date (If Applicable):  5/6/9714    Next Visit Date:    5/8/2023

## 2023-04-20 DIAGNOSIS — I10 ESSENTIAL HYPERTENSION: ICD-10-CM

## 2023-04-20 DIAGNOSIS — E11.8 TYPE 2 DIABETES MELLITUS WITH COMPLICATION, WITHOUT LONG-TERM CURRENT USE OF INSULIN (HCC): ICD-10-CM

## 2023-04-20 DIAGNOSIS — M16.11 PRIMARY OSTEOARTHRITIS OF RIGHT HIP: ICD-10-CM

## 2023-04-20 DIAGNOSIS — M26.629 TEMPOROMANDIBULAR JOINT-PAIN-DYSFUNCTION SYNDROME (TMJ): ICD-10-CM

## 2023-04-20 DIAGNOSIS — R21 RASH OF BACK: ICD-10-CM

## 2023-04-20 DIAGNOSIS — M62.830 BACK MUSCLE SPASM: ICD-10-CM

## 2023-04-20 DIAGNOSIS — E66.01 MORBID OBESITY (HCC): ICD-10-CM

## 2023-04-20 DIAGNOSIS — J42 CHRONIC BRONCHITIS, UNSPECIFIED CHRONIC BRONCHITIS TYPE (HCC): ICD-10-CM

## 2023-04-20 DIAGNOSIS — I89.0 LYMPHEDEMA OF BOTH LOWER EXTREMITIES: ICD-10-CM

## 2023-04-20 DIAGNOSIS — F17.200 SMOKING: ICD-10-CM

## 2023-04-20 DIAGNOSIS — I87.2 VENOUS INSUFFICIENCY (CHRONIC) (PERIPHERAL): ICD-10-CM

## 2023-04-20 DIAGNOSIS — M19.90 ARTHRITIS: ICD-10-CM

## 2023-04-20 DIAGNOSIS — Z99.89 OSA ON CPAP: ICD-10-CM

## 2023-04-20 DIAGNOSIS — G47.33 OSA ON CPAP: ICD-10-CM

## 2023-04-20 DIAGNOSIS — E78.2 MIXED HYPERLIPIDEMIA: ICD-10-CM

## 2023-04-20 RX ORDER — CYCLOBENZAPRINE HCL 10 MG
TABLET ORAL
Qty: 30 TABLET | Refills: 0 | Status: SHIPPED | OUTPATIENT
Start: 2023-04-20

## 2023-04-20 NOTE — TELEPHONE ENCOUNTER
Alek Orta is requesting a refill on the following medication(s):  Requested Prescriptions     Pending Prescriptions Disp Refills    cyclobenzaprine (FLEXERIL) 10 MG tablet [Pharmacy Med Name: Cyclobenzaprine HCl 10 MG Oral Tablet] 30 tablet 0     Sig: Take 1 tablet by mouth three times daily as needed for muscle spasm       Last Visit Date (If Applicable):  0/6/6073    Next Visit Date:    5/8/2023

## 2023-04-25 RX ORDER — DULAGLUTIDE 0.75 MG/.5ML
INJECTION, SOLUTION SUBCUTANEOUS
Qty: 4 ML | Refills: 0 | OUTPATIENT
Start: 2023-04-25

## 2023-05-01 DIAGNOSIS — I89.0 LYMPHEDEMA OF BOTH LOWER EXTREMITIES: ICD-10-CM

## 2023-05-01 DIAGNOSIS — E66.01 MORBID OBESITY (HCC): ICD-10-CM

## 2023-05-01 DIAGNOSIS — Z87.11 HISTORY OF PEPTIC ULCER DISEASE: ICD-10-CM

## 2023-05-01 DIAGNOSIS — K21.9 GASTROESOPHAGEAL REFLUX DISEASE WITHOUT ESOPHAGITIS: ICD-10-CM

## 2023-05-01 DIAGNOSIS — R11.10 DRY HEAVES: ICD-10-CM

## 2023-05-01 DIAGNOSIS — I87.2 VENOUS INSUFFICIENCY (CHRONIC) (PERIPHERAL): ICD-10-CM

## 2023-05-01 DIAGNOSIS — L97.921 SKIN ULCER OF LEFT LOWER LEG, LIMITED TO BREAKDOWN OF SKIN (HCC): ICD-10-CM

## 2023-05-01 DIAGNOSIS — J42 CHRONIC BRONCHITIS, UNSPECIFIED CHRONIC BRONCHITIS TYPE (HCC): ICD-10-CM

## 2023-05-01 DIAGNOSIS — F17.200 SMOKER: ICD-10-CM

## 2023-05-01 RX ORDER — OMEPRAZOLE 40 MG/1
CAPSULE, DELAYED RELEASE ORAL
Qty: 90 CAPSULE | Refills: 0 | Status: SHIPPED | OUTPATIENT
Start: 2023-05-01

## 2023-05-01 RX ORDER — FAMOTIDINE 20 MG/1
TABLET, FILM COATED ORAL
Qty: 180 TABLET | Refills: 0 | Status: SHIPPED | OUTPATIENT
Start: 2023-05-01

## 2023-05-01 NOTE — TELEPHONE ENCOUNTER
Maggy Escoto is requesting a refill on the following medication(s):  Requested Prescriptions     Pending Prescriptions Disp Refills    omeprazole (PRILOSEC) 40 MG delayed release capsule [Pharmacy Med Name: Omeprazole 40 MG Oral Capsule Delayed Release] 90 capsule 0     Sig: TAKE 1 CAPSULE BY MOUTH ONCE DAILY IN THE MORNING BEFORE BREAKFAST    famotidine (PEPCID) 20 MG tablet [Pharmacy Med Name: Famotidine 20 MG Oral Tablet] 180 tablet 0     Sig: Take 1 tablet by mouth twice daily       Last Visit Date (If Applicable):  9/4/7356    Next Visit Date:    5/8/2023

## 2023-05-08 ENCOUNTER — OFFICE VISIT (OUTPATIENT)
Dept: FAMILY MEDICINE CLINIC | Age: 58
End: 2023-05-08
Payer: MEDICARE

## 2023-05-08 VITALS
BODY MASS INDEX: 58.73 KG/M2 | HEART RATE: 85 BPM | SYSTOLIC BLOOD PRESSURE: 134 MMHG | WEIGHT: 315 LBS | OXYGEN SATURATION: 92 % | DIASTOLIC BLOOD PRESSURE: 80 MMHG

## 2023-05-08 DIAGNOSIS — J42 CHRONIC BRONCHITIS, UNSPECIFIED CHRONIC BRONCHITIS TYPE (HCC): ICD-10-CM

## 2023-05-08 DIAGNOSIS — E66.01 MORBID OBESITY (HCC): ICD-10-CM

## 2023-05-08 DIAGNOSIS — E78.2 MIXED HYPERLIPIDEMIA: ICD-10-CM

## 2023-05-08 DIAGNOSIS — R21 RASH OF BACK: ICD-10-CM

## 2023-05-08 DIAGNOSIS — M26.629 TEMPOROMANDIBULAR JOINT-PAIN-DYSFUNCTION SYNDROME (TMJ): ICD-10-CM

## 2023-05-08 DIAGNOSIS — Z79.4 TYPE 2 DIABETES MELLITUS WITH DIABETIC PERIPHERAL ANGIOPATHY WITHOUT GANGRENE, WITH LONG-TERM CURRENT USE OF INSULIN (HCC): Primary | ICD-10-CM

## 2023-05-08 DIAGNOSIS — F17.200 SMOKING: ICD-10-CM

## 2023-05-08 DIAGNOSIS — E11.8 TYPE 2 DIABETES MELLITUS WITH COMPLICATION, WITHOUT LONG-TERM CURRENT USE OF INSULIN (HCC): ICD-10-CM

## 2023-05-08 DIAGNOSIS — G47.33 OSA ON CPAP: ICD-10-CM

## 2023-05-08 DIAGNOSIS — I89.0 LYMPHEDEMA OF BOTH LOWER EXTREMITIES: ICD-10-CM

## 2023-05-08 DIAGNOSIS — M62.830 BACK MUSCLE SPASM: ICD-10-CM

## 2023-05-08 DIAGNOSIS — Z99.89 OSA ON CPAP: ICD-10-CM

## 2023-05-08 DIAGNOSIS — I87.2 VENOUS INSUFFICIENCY (CHRONIC) (PERIPHERAL): ICD-10-CM

## 2023-05-08 DIAGNOSIS — I10 ESSENTIAL HYPERTENSION: ICD-10-CM

## 2023-05-08 DIAGNOSIS — M16.11 PRIMARY OSTEOARTHRITIS OF RIGHT HIP: ICD-10-CM

## 2023-05-08 DIAGNOSIS — E11.51 TYPE 2 DIABETES MELLITUS WITH DIABETIC PERIPHERAL ANGIOPATHY WITHOUT GANGRENE, WITH LONG-TERM CURRENT USE OF INSULIN (HCC): Primary | ICD-10-CM

## 2023-05-08 DIAGNOSIS — M19.90 ARTHRITIS: ICD-10-CM

## 2023-05-08 PROCEDURE — 3051F HG A1C>EQUAL 7.0%<8.0%: CPT

## 2023-05-08 PROCEDURE — 3074F SYST BP LT 130 MM HG: CPT

## 2023-05-08 PROCEDURE — 3078F DIAST BP <80 MM HG: CPT

## 2023-05-08 PROCEDURE — 99214 OFFICE O/P EST MOD 30 MIN: CPT

## 2023-05-08 RX ORDER — CYCLOBENZAPRINE HCL 10 MG
TABLET ORAL
Qty: 30 TABLET | Refills: 0 | Status: SHIPPED | OUTPATIENT
Start: 2023-05-08

## 2023-05-08 RX ORDER — DULAGLUTIDE 3 MG/.5ML
3 INJECTION, SOLUTION SUBCUTANEOUS WEEKLY
Qty: 1 ADJUSTABLE DOSE PRE-FILLED PEN SYRINGE | Refills: 4 | Status: SHIPPED | OUTPATIENT
Start: 2023-05-08

## 2023-05-08 NOTE — TELEPHONE ENCOUNTER
Margretta Hashimoto is requesting a refill on the following medication(s):  Requested Prescriptions     Pending Prescriptions Disp Refills    cyclobenzaprine (FLEXERIL) 10 MG tablet [Pharmacy Med Name: Cyclobenzaprine HCl 10 MG Oral Tablet] 30 tablet 0     Sig: Take 1 tablet by mouth three times daily as needed for muscle spasm       Last Visit Date (If Applicable):  7/5/2922    Next Visit Date:    8/8/2023

## 2023-05-10 PROBLEM — L03.119 CELLULITIS OF LOWER EXTREMITY: Status: ACTIVE | Noted: 2021-04-12

## 2023-05-10 RX ORDER — POTASSIUM CHLORIDE 20 MEQ/1
TABLET, EXTENDED RELEASE ORAL
COMMUNITY
Start: 2023-04-10

## 2023-05-10 ASSESSMENT — ENCOUNTER SYMPTOMS
NAUSEA: 0
BACK PAIN: 0
ABDOMINAL PAIN: 0
CONSTIPATION: 0
DIARRHEA: 0
SHORTNESS OF BREATH: 0
SINUS PRESSURE: 0
COLOR CHANGE: 0
WHEEZING: 0
EYE ITCHING: 0
CHEST TIGHTNESS: 0
EYE DISCHARGE: 0
RHINORRHEA: 0
COUGH: 0
SINUS PAIN: 0

## 2023-05-10 NOTE — ASSESSMENT & PLAN NOTE
At goal, continue current medications and continue current treatment plan smoking cessation is strongly encouraged and discussed. He is not ready to quit at this time. Discussion about prognosis and worsening condition due to smoking. He verbalized understanding. We will continue current monitoring and treatments. Low-dose CT scan will be ordered in the next visit.

## 2023-05-10 NOTE — ASSESSMENT & PLAN NOTE
At goal, changes made today: increase trulicity to 3mg subcutaneous inj weekly. Side effects with this increase are discussed such as GI bloating, constipation. Continue to monitor blood sugar 3-4 times daily, does have continuous blood glucose monitor. Risk of inaccuracy with these as discussed, does need to monitor for this and check blood sugar manually from time to time. Chas verbalized understanding this denies any questions.

## 2023-05-10 NOTE — PROGRESS NOTES
Noelle Mcclure (:  1965) is a 62 y.o. male,Established patient, here for evaluation of the following chief complaint(s):  Diabetes (Patient is here today to follow up for diabetes. He did bring his sugar logs. )         ASSESSMENT/PLAN:  1. Type 2 diabetes mellitus with diabetic peripheral angiopathy without gangrene, with long-term current use of insulin ([address]  Assessment & Plan:   At goal, changes made today: increase trulicity to 3mg subcutaneous inj weekly. Side effects with this increase are discussed such as GI bloating, constipation. Continue to monitor blood sugar 3-4 times daily, does have continuous blood glucose monitor. Risk of inaccuracy with these as discussed, does need to monitor for this and check blood sugar manually from time to time. Chas verbalized understanding this denies any questions. Orders:  -     Dulaglutide (TRULICITY) 3 AO/0.8XJ SOPN; Inject 3 mg into the skin once a week, Disp-1 Adjustable Dose Pre-filled Pen Syringe, R-4Normal  2. Chronic bronchitis, unspecified chronic bronchitis type [facility])  Assessment & Plan:   At goal, continue current medications and continue current treatment plan smoking cessation is strongly encouraged and discussed. He is not ready to quit at this time. Discussion about prognosis and worsening condition due to smoking. He verbalized understanding. We will continue current monitoring and treatments. Low-dose CT scan will be ordered in the next visit. Return in about 3 months (around 2023), or if symptoms worsen or fail to improve. Subjective   SUBJECTIVE/OBJECTIVE:  Diabetes  He presents for his follow-up diabetic visit. He has type 2 diabetes mellitus. His disease course has been stable. Pertinent negatives for hypoglycemia include no confusion, dizziness, headaches or nervousness/anxiousness. Pertinent negatives for diabetes include no chest pain, no fatigue, no polydipsia, no polyphagia and no weakness.  There are no

## 2023-06-03 DIAGNOSIS — I87.2 VENOUS INSUFFICIENCY (CHRONIC) (PERIPHERAL): ICD-10-CM

## 2023-06-03 DIAGNOSIS — I89.0 LYMPHEDEMA OF BOTH LOWER EXTREMITIES: ICD-10-CM

## 2023-06-03 DIAGNOSIS — M16.11 PRIMARY OSTEOARTHRITIS OF RIGHT HIP: ICD-10-CM

## 2023-06-03 DIAGNOSIS — G47.33 OSA ON CPAP: ICD-10-CM

## 2023-06-03 DIAGNOSIS — M62.830 BACK MUSCLE SPASM: ICD-10-CM

## 2023-06-03 DIAGNOSIS — I10 ESSENTIAL HYPERTENSION: ICD-10-CM

## 2023-06-03 DIAGNOSIS — E11.8 TYPE 2 DIABETES MELLITUS WITH COMPLICATION, WITHOUT LONG-TERM CURRENT USE OF INSULIN (HCC): ICD-10-CM

## 2023-06-03 DIAGNOSIS — Z99.89 OSA ON CPAP: ICD-10-CM

## 2023-06-03 DIAGNOSIS — R21 RASH OF BACK: ICD-10-CM

## 2023-06-03 DIAGNOSIS — F17.200 SMOKING: ICD-10-CM

## 2023-06-03 DIAGNOSIS — E66.01 MORBID OBESITY (HCC): ICD-10-CM

## 2023-06-03 DIAGNOSIS — J42 CHRONIC BRONCHITIS, UNSPECIFIED CHRONIC BRONCHITIS TYPE (HCC): ICD-10-CM

## 2023-06-03 DIAGNOSIS — M26.629 TEMPOROMANDIBULAR JOINT-PAIN-DYSFUNCTION SYNDROME (TMJ): ICD-10-CM

## 2023-06-03 DIAGNOSIS — E78.2 MIXED HYPERLIPIDEMIA: ICD-10-CM

## 2023-06-03 DIAGNOSIS — M19.90 ARTHRITIS: ICD-10-CM

## 2023-06-05 RX ORDER — CYCLOBENZAPRINE HCL 10 MG
TABLET ORAL
Qty: 90 TABLET | Refills: 0 | Status: SHIPPED | OUTPATIENT
Start: 2023-06-05

## 2023-06-05 NOTE — TELEPHONE ENCOUNTER
Skyler Rogel is requesting a refill on the following medication(s):  Requested Prescriptions     Pending Prescriptions Disp Refills    cyclobenzaprine (FLEXERIL) 10 MG tablet [Pharmacy Med Name: Cyclobenzaprine HCl 10 MG Oral Tablet] 30 tablet 0     Sig: Take 1 tablet by mouth three times daily as needed for muscle spasm       Last Visit Date (If Applicable):  4/2/6624    Next Visit Date:    8/8/2023

## 2023-06-09 ENCOUNTER — TELEPHONE (OUTPATIENT)
Dept: FAMILY MEDICINE CLINIC | Age: 58
End: 2023-06-09

## 2023-06-09 RX ORDER — BLOOD-GLUCOSE TRANSMITTER
1 EACH MISCELLANEOUS DAILY
Qty: 1 EACH | Refills: 1 | Status: SHIPPED | OUTPATIENT
Start: 2023-06-09

## 2023-06-09 RX ORDER — BLOOD-GLUCOSE SENSOR
1 EACH MISCELLANEOUS DAILY
Qty: 1 EACH | Refills: 1 | Status: SHIPPED | OUTPATIENT
Start: 2023-06-09

## 2023-06-09 NOTE — TELEPHONE ENCOUNTER
Spoke to [employer]. Patient is set up for a delivery. They just need his last office note. Most recent office note was faxed to [phone]. Patient's wife was notified. Phone number for [employer] is [phone] if we need it again.

## 2023-06-09 NOTE — TELEPHONE ENCOUNTER
Patient is Putting on last sensor. He fills these at [facility]- Fax number is   Do not send to [phone]! Con Cedillo is requesting a refill on the following medication(s):  Requested Prescriptions     Pending Prescriptions Disp Refills    Continuous Blood Gluc Sensor (DEXCOM G4 SENSOR) MISC 1 each 1     Si each by Does not apply route daily Monitor Blood sugar at least 4 times a day, on insulin. Continuous Blood Gluc Transmit (DEXCOM G4 PLATINUM TRANSMITTER) MISC 1 each 1     Si each by Does not apply route daily Monitor Blood sugar at least 4 times a day, on insulin.        Last Visit Date (If Applicable):  3/1/5351    Next Visit Date:    2023

## 2023-07-28 DIAGNOSIS — L97.921 SKIN ULCER OF LEFT LOWER LEG, LIMITED TO BREAKDOWN OF SKIN (HCC): ICD-10-CM

## 2023-07-28 DIAGNOSIS — R11.10 DRY HEAVES: ICD-10-CM

## 2023-07-28 DIAGNOSIS — Z87.11 HISTORY OF PEPTIC ULCER DISEASE: ICD-10-CM

## 2023-07-28 DIAGNOSIS — J42 CHRONIC BRONCHITIS, UNSPECIFIED CHRONIC BRONCHITIS TYPE (HCC): ICD-10-CM

## 2023-07-28 DIAGNOSIS — E66.01 MORBID OBESITY (HCC): ICD-10-CM

## 2023-07-28 DIAGNOSIS — K21.9 GASTROESOPHAGEAL REFLUX DISEASE WITHOUT ESOPHAGITIS: ICD-10-CM

## 2023-07-28 DIAGNOSIS — I89.0 LYMPHEDEMA OF BOTH LOWER EXTREMITIES: ICD-10-CM

## 2023-07-28 DIAGNOSIS — I87.2 VENOUS INSUFFICIENCY (CHRONIC) (PERIPHERAL): ICD-10-CM

## 2023-07-28 DIAGNOSIS — F17.200 SMOKER: ICD-10-CM

## 2023-07-31 RX ORDER — OMEPRAZOLE 40 MG/1
CAPSULE, DELAYED RELEASE ORAL
Qty: 90 CAPSULE | Refills: 0 | Status: SHIPPED | OUTPATIENT
Start: 2023-07-31

## 2023-07-31 NOTE — TELEPHONE ENCOUNTER
Lavinia Yuen is requesting a refill on the following medication(s):  Requested Prescriptions     Pending Prescriptions Disp Refills    omeprazole (PRILOSEC) 40 MG delayed release capsule [Pharmacy Med Name: Omeprazole 40 MG Oral Capsule Delayed Release] 90 capsule 0     Sig: TAKE 1 CAPSULE BY MOUTH ONCE DAILY IN THE MORNING BEFORE BREAKFAST       Last Visit Date (If Applicable):  2/6/8664    Next Visit Date:    8/8/2023

## 2023-08-07 DIAGNOSIS — G47.33 OSA ON CPAP: ICD-10-CM

## 2023-08-07 DIAGNOSIS — J42 CHRONIC BRONCHITIS, UNSPECIFIED CHRONIC BRONCHITIS TYPE (HCC): ICD-10-CM

## 2023-08-07 DIAGNOSIS — M19.90 ARTHRITIS: ICD-10-CM

## 2023-08-07 DIAGNOSIS — I87.2 VENOUS INSUFFICIENCY (CHRONIC) (PERIPHERAL): ICD-10-CM

## 2023-08-07 DIAGNOSIS — F17.200 SMOKING: ICD-10-CM

## 2023-08-07 DIAGNOSIS — E11.8 TYPE 2 DIABETES MELLITUS WITH COMPLICATION, WITHOUT LONG-TERM CURRENT USE OF INSULIN (HCC): ICD-10-CM

## 2023-08-07 DIAGNOSIS — M16.11 PRIMARY OSTEOARTHRITIS OF RIGHT HIP: ICD-10-CM

## 2023-08-07 DIAGNOSIS — Z99.89 OSA ON CPAP: ICD-10-CM

## 2023-08-07 DIAGNOSIS — I89.0 LYMPHEDEMA OF BOTH LOWER EXTREMITIES: ICD-10-CM

## 2023-08-07 DIAGNOSIS — E78.2 MIXED HYPERLIPIDEMIA: ICD-10-CM

## 2023-08-07 DIAGNOSIS — R21 RASH OF BACK: ICD-10-CM

## 2023-08-07 DIAGNOSIS — I10 ESSENTIAL HYPERTENSION: ICD-10-CM

## 2023-08-07 DIAGNOSIS — M26.629 TEMPOROMANDIBULAR JOINT-PAIN-DYSFUNCTION SYNDROME (TMJ): ICD-10-CM

## 2023-08-07 DIAGNOSIS — M62.830 BACK MUSCLE SPASM: ICD-10-CM

## 2023-08-07 DIAGNOSIS — E66.01 MORBID OBESITY (HCC): ICD-10-CM

## 2023-08-07 RX ORDER — ATORVASTATIN CALCIUM 40 MG/1
TABLET, FILM COATED ORAL
Qty: 100 TABLET | Refills: 0 | Status: SHIPPED | OUTPATIENT
Start: 2023-08-07

## 2023-08-07 RX ORDER — LISINOPRIL 20 MG/1
TABLET ORAL
Qty: 100 TABLET | Refills: 0 | Status: SHIPPED | OUTPATIENT
Start: 2023-08-07

## 2023-08-07 NOTE — TELEPHONE ENCOUNTER
Agata Doherty is requesting a refill on the following medication(s):  Requested Prescriptions     Pending Prescriptions Disp Refills    lisinopril (PRINIVIL;ZESTRIL) 20 MG tablet [Pharmacy Med Name: Lisinopril 20 MG Oral Tablet] 100 tablet 0     Sig: Take 1 tablet by mouth once daily    atorvastatin (LIPITOR) 40 MG tablet [Pharmacy Med Name: Atorvastatin Calcium 40 MG Oral Tablet] 100 tablet 0     Sig: Take 1 tablet by mouth once daily       Last Visit Date (If Applicable):  6/7/5842    Next Visit Date:    8/8/2023

## 2023-08-07 NOTE — TELEPHONE ENCOUNTER
Tristin Marquis is requesting a refill on the following medication(s):  Requested Prescriptions     Pending Prescriptions Disp Refills    metFORMIN (GLUCOPHAGE) 1000 MG tablet [Pharmacy Med Name: metFORMIN HCl 1000 MG Oral Tablet] 200 tablet 0     Sig: TAKE 1 TABLET BY MOUTH TWICE DAILY WITH MEALS       Last Visit Date (If Applicable):  1/2/5262    Next Visit Date:    8/8/2023

## 2023-08-08 ENCOUNTER — OFFICE VISIT (OUTPATIENT)
Dept: FAMILY MEDICINE CLINIC | Age: 58
End: 2023-08-08
Payer: MEDICARE

## 2023-08-08 VITALS
BODY MASS INDEX: 42.66 KG/M2 | OXYGEN SATURATION: 95 % | WEIGHT: 315 LBS | SYSTOLIC BLOOD PRESSURE: 130 MMHG | HEART RATE: 92 BPM | HEIGHT: 72 IN | DIASTOLIC BLOOD PRESSURE: 82 MMHG

## 2023-08-08 DIAGNOSIS — E11.51 TYPE 2 DIABETES MELLITUS WITH DIABETIC PERIPHERAL ANGIOPATHY WITHOUT GANGRENE, WITH LONG-TERM CURRENT USE OF INSULIN (HCC): Primary | ICD-10-CM

## 2023-08-08 DIAGNOSIS — Z79.4 TYPE 2 DIABETES MELLITUS WITH DIABETIC PERIPHERAL ANGIOPATHY WITHOUT GANGRENE, WITH LONG-TERM CURRENT USE OF INSULIN (HCC): Primary | ICD-10-CM

## 2023-08-08 DIAGNOSIS — H60.391 OTHER INFECTIVE ACUTE OTITIS EXTERNA OF RIGHT EAR: ICD-10-CM

## 2023-08-08 LAB — HBA1C MFR BLD: 7.7 %

## 2023-08-08 PROCEDURE — 83037 HB GLYCOSYLATED A1C HOME DEV: CPT

## 2023-08-08 PROCEDURE — 3078F DIAST BP <80 MM HG: CPT

## 2023-08-08 PROCEDURE — 3074F SYST BP LT 130 MM HG: CPT

## 2023-08-08 PROCEDURE — PBSHW POCT GLYCOSYLATED HEMOGLOBIN (HGB A1C)

## 2023-08-08 PROCEDURE — 99214 OFFICE O/P EST MOD 30 MIN: CPT

## 2023-08-08 PROCEDURE — 3051F HG A1C>EQUAL 7.0%<8.0%: CPT

## 2023-08-08 PROCEDURE — 99212 OFFICE O/P EST SF 10 MIN: CPT

## 2023-08-08 RX ORDER — OFLOXACIN 3 MG/ML
5 SOLUTION AURICULAR (OTIC) 2 TIMES DAILY
Qty: 5 ML | Refills: 0 | Status: SHIPPED | OUTPATIENT
Start: 2023-08-08 | End: 2023-08-18

## 2023-08-14 ASSESSMENT — ENCOUNTER SYMPTOMS
NAUSEA: 0
WHEEZING: 0
ABDOMINAL PAIN: 0
CHEST TIGHTNESS: 0
CONSTIPATION: 0
BACK PAIN: 0
EYE DISCHARGE: 0
COLOR CHANGE: 0
COUGH: 0
SINUS PAIN: 0
SINUS PRESSURE: 0
EYE ITCHING: 0
RHINORRHEA: 0
DIARRHEA: 0
SHORTNESS OF BREATH: 0

## 2023-09-03 DIAGNOSIS — E66.01 MORBID OBESITY (HCC): ICD-10-CM

## 2023-09-03 DIAGNOSIS — I87.2 VENOUS INSUFFICIENCY (CHRONIC) (PERIPHERAL): ICD-10-CM

## 2023-09-03 DIAGNOSIS — R21 RASH OF BACK: ICD-10-CM

## 2023-09-03 DIAGNOSIS — Z99.89 OSA ON CPAP: ICD-10-CM

## 2023-09-03 DIAGNOSIS — M19.90 ARTHRITIS: ICD-10-CM

## 2023-09-03 DIAGNOSIS — J42 CHRONIC BRONCHITIS, UNSPECIFIED CHRONIC BRONCHITIS TYPE (HCC): ICD-10-CM

## 2023-09-03 DIAGNOSIS — G47.33 OSA ON CPAP: ICD-10-CM

## 2023-09-03 DIAGNOSIS — M26.629 TEMPOROMANDIBULAR JOINT-PAIN-DYSFUNCTION SYNDROME (TMJ): ICD-10-CM

## 2023-09-03 DIAGNOSIS — I10 ESSENTIAL HYPERTENSION: ICD-10-CM

## 2023-09-03 DIAGNOSIS — E78.2 MIXED HYPERLIPIDEMIA: ICD-10-CM

## 2023-09-03 DIAGNOSIS — I89.0 LYMPHEDEMA OF BOTH LOWER EXTREMITIES: ICD-10-CM

## 2023-09-03 DIAGNOSIS — M16.11 PRIMARY OSTEOARTHRITIS OF RIGHT HIP: ICD-10-CM

## 2023-09-03 DIAGNOSIS — E11.8 TYPE 2 DIABETES MELLITUS WITH COMPLICATION, WITHOUT LONG-TERM CURRENT USE OF INSULIN (HCC): ICD-10-CM

## 2023-09-03 DIAGNOSIS — M62.830 BACK MUSCLE SPASM: ICD-10-CM

## 2023-09-03 DIAGNOSIS — F17.200 SMOKING: ICD-10-CM

## 2023-09-05 RX ORDER — CYCLOBENZAPRINE HCL 10 MG
TABLET ORAL
Qty: 90 TABLET | Refills: 0 | Status: SHIPPED | OUTPATIENT
Start: 2023-09-05

## 2023-09-05 NOTE — TELEPHONE ENCOUNTER
Agata Doherty is requesting a refill on the following medication(s):  Requested Prescriptions     Pending Prescriptions Disp Refills    cyclobenzaprine (FLEXERIL) 10 MG tablet [Pharmacy Med Name: Cyclobenzaprine HCl 10 MG Oral Tablet] 90 tablet 0     Sig: Take 1 tablet by mouth three times daily as needed for muscle spasm       Last Visit Date (If Applicable):  7/5/5025    Next Visit Date:    2/8/2024

## 2023-10-31 DIAGNOSIS — F17.200 SMOKER: ICD-10-CM

## 2023-10-31 DIAGNOSIS — I87.2 VENOUS INSUFFICIENCY (CHRONIC) (PERIPHERAL): ICD-10-CM

## 2023-10-31 DIAGNOSIS — I89.0 LYMPHEDEMA OF BOTH LOWER EXTREMITIES: ICD-10-CM

## 2023-10-31 DIAGNOSIS — E66.01 MORBID OBESITY (HCC): ICD-10-CM

## 2023-10-31 DIAGNOSIS — L97.921 SKIN ULCER OF LEFT LOWER LEG, LIMITED TO BREAKDOWN OF SKIN (HCC): ICD-10-CM

## 2023-10-31 DIAGNOSIS — Z87.11 HISTORY OF PEPTIC ULCER DISEASE: ICD-10-CM

## 2023-10-31 DIAGNOSIS — R11.10 DRY HEAVES: ICD-10-CM

## 2023-10-31 DIAGNOSIS — K21.9 GASTROESOPHAGEAL REFLUX DISEASE WITHOUT ESOPHAGITIS: ICD-10-CM

## 2023-10-31 DIAGNOSIS — J42 CHRONIC BRONCHITIS, UNSPECIFIED CHRONIC BRONCHITIS TYPE (HCC): ICD-10-CM

## 2023-10-31 RX ORDER — OMEPRAZOLE 40 MG/1
CAPSULE, DELAYED RELEASE ORAL
Qty: 90 CAPSULE | Refills: 0 | Status: SHIPPED | OUTPATIENT
Start: 2023-10-31

## 2023-10-31 NOTE — TELEPHONE ENCOUNTER
Tammi Lopez is requesting a refill on the following medication(s):  Requested Prescriptions     Pending Prescriptions Disp Refills    omeprazole (PRILOSEC) 40 MG delayed release capsule [Pharmacy Med Name: Omeprazole 40 MG Oral Capsule Delayed Release] 90 capsule 0     Sig: TAKE 1 CAPSULE BY MOUTH ONCE DAILY IN THE MORNING BEFORE BREAKFAST       Last Visit Date (If Applicable):  0/2/8903    Next Visit Date:    2/8/2024

## 2023-11-10 DIAGNOSIS — M62.830 BACK MUSCLE SPASM: ICD-10-CM

## 2023-11-10 DIAGNOSIS — M19.90 ARTHRITIS: ICD-10-CM

## 2023-11-10 DIAGNOSIS — R21 RASH OF BACK: ICD-10-CM

## 2023-11-10 DIAGNOSIS — F17.200 SMOKING: ICD-10-CM

## 2023-11-10 DIAGNOSIS — J42 CHRONIC BRONCHITIS, UNSPECIFIED CHRONIC BRONCHITIS TYPE (HCC): ICD-10-CM

## 2023-11-10 DIAGNOSIS — E66.01 MORBID OBESITY (HCC): ICD-10-CM

## 2023-11-10 DIAGNOSIS — E11.8 TYPE 2 DIABETES MELLITUS WITH COMPLICATION, WITHOUT LONG-TERM CURRENT USE OF INSULIN (HCC): ICD-10-CM

## 2023-11-10 DIAGNOSIS — I10 ESSENTIAL HYPERTENSION: ICD-10-CM

## 2023-11-10 DIAGNOSIS — E78.2 MIXED HYPERLIPIDEMIA: ICD-10-CM

## 2023-11-10 DIAGNOSIS — I89.0 LYMPHEDEMA OF BOTH LOWER EXTREMITIES: ICD-10-CM

## 2023-11-10 DIAGNOSIS — G47.33 OSA ON CPAP: ICD-10-CM

## 2023-11-10 DIAGNOSIS — M16.11 PRIMARY OSTEOARTHRITIS OF RIGHT HIP: ICD-10-CM

## 2023-11-10 DIAGNOSIS — I87.2 VENOUS INSUFFICIENCY (CHRONIC) (PERIPHERAL): ICD-10-CM

## 2023-11-10 DIAGNOSIS — M26.629 TEMPOROMANDIBULAR JOINT-PAIN-DYSFUNCTION SYNDROME (TMJ): ICD-10-CM

## 2023-11-10 RX ORDER — LISINOPRIL 20 MG/1
TABLET ORAL
Qty: 100 TABLET | Refills: 1 | Status: SHIPPED | OUTPATIENT
Start: 2023-11-10

## 2023-11-10 RX ORDER — ATORVASTATIN CALCIUM 40 MG/1
TABLET, FILM COATED ORAL
Qty: 100 TABLET | Refills: 1 | Status: SHIPPED | OUTPATIENT
Start: 2023-11-10

## 2023-11-10 NOTE — TELEPHONE ENCOUNTER
Johnathan Brennan is requesting a refill on the following medication(s):  Requested Prescriptions     Pending Prescriptions Disp Refills    atorvastatin (LIPITOR) 40 MG tablet [Pharmacy Med Name: Atorvastatin Calcium 40 MG Oral Tablet] 100 tablet 1     Sig: Take 1 tablet by mouth once daily    lisinopril (PRINIVIL;ZESTRIL) 20 MG tablet [Pharmacy Med Name: Lisinopril 20 MG Oral Tablet] 100 tablet 1     Sig: Take 1 tablet by mouth once daily       Last Visit Date (If Applicable):  5/6/7868    Next Visit Date:    2/8/2024

## 2023-11-11 DIAGNOSIS — M19.90 ARTHRITIS: ICD-10-CM

## 2023-11-11 DIAGNOSIS — F17.200 SMOKING: ICD-10-CM

## 2023-11-11 DIAGNOSIS — I89.0 LYMPHEDEMA OF BOTH LOWER EXTREMITIES: ICD-10-CM

## 2023-11-11 DIAGNOSIS — E11.8 TYPE 2 DIABETES MELLITUS WITH COMPLICATION, WITHOUT LONG-TERM CURRENT USE OF INSULIN (HCC): ICD-10-CM

## 2023-11-11 DIAGNOSIS — G47.33 OSA ON CPAP: ICD-10-CM

## 2023-11-11 DIAGNOSIS — J42 CHRONIC BRONCHITIS, UNSPECIFIED CHRONIC BRONCHITIS TYPE (HCC): ICD-10-CM

## 2023-11-11 DIAGNOSIS — E66.01 MORBID OBESITY (HCC): ICD-10-CM

## 2023-11-11 DIAGNOSIS — R21 RASH OF BACK: ICD-10-CM

## 2023-11-11 DIAGNOSIS — I87.2 VENOUS INSUFFICIENCY (CHRONIC) (PERIPHERAL): ICD-10-CM

## 2023-11-11 DIAGNOSIS — M62.830 BACK MUSCLE SPASM: ICD-10-CM

## 2023-11-11 DIAGNOSIS — M26.629 TEMPOROMANDIBULAR JOINT-PAIN-DYSFUNCTION SYNDROME (TMJ): ICD-10-CM

## 2023-11-11 DIAGNOSIS — M16.11 PRIMARY OSTEOARTHRITIS OF RIGHT HIP: ICD-10-CM

## 2023-11-11 DIAGNOSIS — E78.2 MIXED HYPERLIPIDEMIA: ICD-10-CM

## 2023-11-11 DIAGNOSIS — I10 ESSENTIAL HYPERTENSION: ICD-10-CM

## 2023-11-13 NOTE — TELEPHONE ENCOUNTER
Audrey [place]erry is requesting a refill on the following medication(s):  Requested Prescriptions     Pending Prescriptions Disp Refills    metFORMIN (GLUCOPHAGE) 1000 MG tablet [Pharmacy Med Name: metFORMIN HCl 1000 MG Oral Tablet] 200 tablet 0     Sig: TAKE 1 TABLET BY MOUTH TWICE DAILY WITH MEALS       Last Visit Date (If Applicable):  2/8/5521    Next Visit Date:    2/8/2024

## 2023-11-14 ENCOUNTER — OFFICE VISIT (OUTPATIENT)
Dept: FAMILY MEDICINE CLINIC | Age: 58
End: 2023-11-14
Payer: MEDICARE

## 2023-11-14 VITALS
SYSTOLIC BLOOD PRESSURE: 134 MMHG | WEIGHT: 315 LBS | OXYGEN SATURATION: 96 % | BODY MASS INDEX: 57.78 KG/M2 | HEART RATE: 103 BPM | DIASTOLIC BLOOD PRESSURE: 80 MMHG

## 2023-11-14 DIAGNOSIS — I87.2 VENOUS INSUFFICIENCY (CHRONIC) (PERIPHERAL): ICD-10-CM

## 2023-11-14 DIAGNOSIS — E11.51 TYPE 2 DIABETES MELLITUS WITH DIABETIC PERIPHERAL ANGIOPATHY WITHOUT GANGRENE, WITH LONG-TERM CURRENT USE OF INSULIN (HCC): Primary | ICD-10-CM

## 2023-11-14 DIAGNOSIS — I89.0 LYMPHEDEMA OF BOTH LOWER EXTREMITIES: ICD-10-CM

## 2023-11-14 DIAGNOSIS — Z79.4 TYPE 2 DIABETES MELLITUS WITH DIABETIC PERIPHERAL ANGIOPATHY WITHOUT GANGRENE, WITH LONG-TERM CURRENT USE OF INSULIN (HCC): Primary | ICD-10-CM

## 2023-11-14 DIAGNOSIS — R60.0 LOWER LEG EDEMA: ICD-10-CM

## 2023-11-14 LAB — HBA1C MFR BLD: 8 %

## 2023-11-14 PROCEDURE — 83036 HEMOGLOBIN GLYCOSYLATED A1C: CPT

## 2023-11-14 PROCEDURE — 3074F SYST BP LT 130 MM HG: CPT

## 2023-11-14 PROCEDURE — 3078F DIAST BP <80 MM HG: CPT

## 2023-11-14 PROCEDURE — 3052F HG A1C>EQUAL 8.0%<EQUAL 9.0%: CPT

## 2023-11-14 PROCEDURE — 99212 OFFICE O/P EST SF 10 MIN: CPT

## 2023-11-14 PROCEDURE — PBSHW POCT GLYCOSYLATED HEMOGLOBIN (HGB A1C)

## 2023-11-14 PROCEDURE — 99214 OFFICE O/P EST MOD 30 MIN: CPT

## 2023-11-14 RX ORDER — GLIPIZIDE 5 MG/1
5 TABLET, FILM COATED, EXTENDED RELEASE ORAL DAILY
Qty: 30 TABLET | Refills: 0 | Status: SHIPPED | OUTPATIENT
Start: 2023-11-14

## 2023-11-14 NOTE — PROGRESS NOTES
Multiple Minerals-Vitamins (CALCIUM-MAGNESIUM-ZINC-D3) TABS Take by mouth      buPROPion (WELLBUTRIN SR) 150 MG extended release tablet Take 1 tablet by mouth daily 60 tablet 5    triamcinolone (ARISTOCORT) 0.5 % cream Apply topically 3 times daily. 1 Tube 0    aspirin 81 MG tablet Take 1 tablet by mouth daily      docusate sodium (COLACE) 100 MG capsule TAKE ONE CAPSULE BY MOUTH TWICE DAILY      mupirocin (BACTROBAN) 2 % ointment APPLY A SMALL AMOUNT TO EACH NOSTRIL TWICE DAILY STARTING 5 DAYS PRIOR TO SURGERY  0    Acetaminophen (TYLENOL ARTHRITIS PAIN PO) Take 1 tablet by mouth as needed      CPAP Machine MISC by Does not apply route       No current facility-administered medications for this visit. Review of Systems   Constitutional:  Negative for chills, fatigue and fever. HENT:  Negative for congestion. Respiratory:  Positive for cough. Negative for chest tightness, shortness of breath and wheezing. Cardiovascular:  Positive for leg swelling. Negative for chest pain. Gastrointestinal:  Negative for abdominal pain. Musculoskeletal:  Positive for arthralgias. Psychiatric/Behavioral:  Negative for agitation. 2/8/2023     8:47 AM 5/16/2022     8:21 AM 3/17/2022     9:40 AM 4/15/2021     9:42 AM 1/28/2020     9:42 AM 7/26/2019    10:08 AM 10/29/2018     9:53 AM   PHQ Scores   PHQ2 Score 0 0 0 0 0 0 0   PHQ9 Score 0 0 0 0 0 0 0     Interpretation of Total Score Depression Severity: 1-4 = Minimal depression, 5-9 = Mild depression, 10-14 = Moderate depression, 15-19 = Moderately severe depression, 20-27 = Severe depression     Objective:     Vitals:    11/14/23 1330   BP: 134/80   Site: Right Upper Arm   Position: Sitting   Pulse: (!) 103   SpO2: 96%   Weight: (!) 193.2 kg (426 lb)        Physical Exam  Vitals and nursing note reviewed. Constitutional:       Appearance: Normal appearance. HENT:      Head: Normocephalic.       Right Ear: External ear normal.      Left Ear: External

## 2023-11-15 ENCOUNTER — TELEPHONE (OUTPATIENT)
Dept: FAMILY MEDICINE CLINIC | Age: 58
End: 2023-11-15

## 2023-11-15 RX ORDER — FUROSEMIDE 40 MG/1
40 TABLET ORAL DAILY
Qty: 30 TABLET | Refills: 0 | Status: SHIPPED | OUTPATIENT
Start: 2023-11-15 | End: 2023-12-15

## 2023-11-15 NOTE — TELEPHONE ENCOUNTER
----- Message from [facility] sent at 11/14/2023  3:02 PM EST -----  Subject: Message to Provider    QUESTIONS  Information for Provider? Niko Shannon would like for Dayron John to know that he is   no longer taking his water pill, Lasix and he can be reached at   [phone] ok to leave a message  ---------------------------------------------------------------------------  --------------  [address] [place], [place] [address]  [phone]; OK to leave message on voicemail  ---------------------------------------------------------------------------  --------------  SCRIPT ANSWERS  Relationship to Patient? Other/Third Party  Representative Name? Meena Tee  Is the representative on the Communication Release of Information (TAMMY)   form in Epic?  Yes

## 2023-11-18 DIAGNOSIS — F17.200 SMOKING: ICD-10-CM

## 2023-11-18 DIAGNOSIS — J42 CHRONIC BRONCHITIS, UNSPECIFIED CHRONIC BRONCHITIS TYPE (HCC): ICD-10-CM

## 2023-11-18 DIAGNOSIS — M19.90 ARTHRITIS: ICD-10-CM

## 2023-11-18 DIAGNOSIS — E66.01 MORBID OBESITY (HCC): ICD-10-CM

## 2023-11-18 DIAGNOSIS — I87.2 VENOUS INSUFFICIENCY (CHRONIC) (PERIPHERAL): ICD-10-CM

## 2023-11-18 DIAGNOSIS — L97.921 SKIN ULCER OF LEFT LOWER LEG, LIMITED TO BREAKDOWN OF SKIN (HCC): ICD-10-CM

## 2023-11-18 DIAGNOSIS — F17.200 SMOKER: ICD-10-CM

## 2023-11-18 DIAGNOSIS — R11.10 DRY HEAVES: ICD-10-CM

## 2023-11-18 DIAGNOSIS — R21 RASH OF BACK: ICD-10-CM

## 2023-11-18 DIAGNOSIS — K21.9 GASTROESOPHAGEAL REFLUX DISEASE WITHOUT ESOPHAGITIS: ICD-10-CM

## 2023-11-18 DIAGNOSIS — I10 ESSENTIAL HYPERTENSION: ICD-10-CM

## 2023-11-18 DIAGNOSIS — G47.33 OSA ON CPAP: ICD-10-CM

## 2023-11-18 DIAGNOSIS — M26.629 TEMPOROMANDIBULAR JOINT-PAIN-DYSFUNCTION SYNDROME (TMJ): ICD-10-CM

## 2023-11-18 DIAGNOSIS — Z87.11 HISTORY OF PEPTIC ULCER DISEASE: ICD-10-CM

## 2023-11-18 DIAGNOSIS — M16.11 PRIMARY OSTEOARTHRITIS OF RIGHT HIP: ICD-10-CM

## 2023-11-18 DIAGNOSIS — E11.8 TYPE 2 DIABETES MELLITUS WITH COMPLICATION, WITHOUT LONG-TERM CURRENT USE OF INSULIN (HCC): ICD-10-CM

## 2023-11-18 DIAGNOSIS — I89.0 LYMPHEDEMA OF BOTH LOWER EXTREMITIES: ICD-10-CM

## 2023-11-18 DIAGNOSIS — E78.2 MIXED HYPERLIPIDEMIA: ICD-10-CM

## 2023-11-18 DIAGNOSIS — M62.830 BACK MUSCLE SPASM: ICD-10-CM

## 2023-11-20 RX ORDER — CYCLOBENZAPRINE HCL 10 MG
TABLET ORAL
Qty: 90 TABLET | Refills: 0 | Status: SHIPPED | OUTPATIENT
Start: 2023-11-20

## 2023-11-20 RX ORDER — FAMOTIDINE 20 MG/1
TABLET, FILM COATED ORAL
Qty: 180 TABLET | Refills: 0 | Status: SHIPPED | OUTPATIENT
Start: 2023-11-20

## 2023-11-20 ASSESSMENT — ENCOUNTER SYMPTOMS
COUGH: 1
CHEST TIGHTNESS: 0
SHORTNESS OF BREATH: 0
WHEEZING: 0
ABDOMINAL PAIN: 0

## 2023-11-20 NOTE — ASSESSMENT & PLAN NOTE
Uncontrolled, changes made today: We will add glipizide 5 mg 1 tablet by mouth daily, does have continuous blood glucose monitor he is encouraged to continue to monitor this, notify the office right away if he notices blood sugars dipping down below 70. A1c is checked today and to be notably elevated compared to prior. Diet is discussed. Should be eating less carbohydrates. Side effects of glipizide such as hypoglycemia are discussed. If he develops this he is notify the office right away, go to the emergency department as this could cause hypoglycemia for multiple hours.

## 2023-11-20 NOTE — TELEPHONE ENCOUNTER
Maya Neely is requesting a refill on the following medication(s):  Requested Prescriptions     Pending Prescriptions Disp Refills    famotidine (PEPCID) 20 MG tablet [Pharmacy Med Name: Famotidine 20 MG Oral Tablet] 180 tablet 0     Sig: Take 1 tablet by mouth twice daily    cyclobenzaprine (FLEXERIL) 10 MG tablet [Pharmacy Med Name: Cyclobenzaprine HCl 10 MG Oral Tablet] 90 tablet 0     Sig: Take 1 tablet by mouth three times daily as needed for muscle spasm       Last Visit Date (If Applicable):  11/68/5635    Next Visit Date:    2/8/2024

## 2023-12-11 DIAGNOSIS — E11.51 TYPE 2 DIABETES MELLITUS WITH DIABETIC PERIPHERAL ANGIOPATHY WITHOUT GANGRENE, WITH LONG-TERM CURRENT USE OF INSULIN (HCC): ICD-10-CM

## 2023-12-11 DIAGNOSIS — Z79.4 TYPE 2 DIABETES MELLITUS WITH DIABETIC PERIPHERAL ANGIOPATHY WITHOUT GANGRENE, WITH LONG-TERM CURRENT USE OF INSULIN (HCC): ICD-10-CM

## 2023-12-11 RX ORDER — GLIPIZIDE 5 MG/1
5 TABLET, FILM COATED, EXTENDED RELEASE ORAL DAILY
Qty: 30 TABLET | Refills: 0 | Status: SHIPPED | OUTPATIENT
Start: 2023-12-11

## 2023-12-11 NOTE — TELEPHONE ENCOUNTER
Marleny Valdez is requesting a refill on the following medication(s):  Requested Prescriptions     Pending Prescriptions Disp Refills    glipiZIDE (GLUCOTROL XL) 5 MG extended release tablet [Pharmacy Med Name: glipiZIDE ER 5 MG Oral Tablet Extended Release 24 Hour] 30 tablet 0     Sig: Take 1 tablet by mouth once daily       Last Visit Date (If Applicable):  74/87/2916    Next Visit Date:    2/8/2024

## 2023-12-21 ENCOUNTER — OFFICE VISIT (OUTPATIENT)
Dept: FAMILY MEDICINE CLINIC | Age: 58
End: 2023-12-21

## 2023-12-21 VITALS — DIASTOLIC BLOOD PRESSURE: 60 MMHG | HEART RATE: 115 BPM | SYSTOLIC BLOOD PRESSURE: 102 MMHG | OXYGEN SATURATION: 97 %

## 2023-12-21 DIAGNOSIS — L97.921 SKIN ULCER OF LEFT LOWER LEG, LIMITED TO BREAKDOWN OF SKIN (HCC): ICD-10-CM

## 2023-12-21 DIAGNOSIS — Z09 HOSPITAL DISCHARGE FOLLOW-UP: Primary | ICD-10-CM

## 2023-12-21 DIAGNOSIS — A41.9 SEPTIC SHOCK (HCC): ICD-10-CM

## 2023-12-21 DIAGNOSIS — Z95.828 S/P PICC CENTRAL LINE PLACEMENT: ICD-10-CM

## 2023-12-21 DIAGNOSIS — R65.21 SEPTIC SHOCK (HCC): ICD-10-CM

## 2023-12-21 RX ORDER — FUROSEMIDE 40 MG/1
80 TABLET ORAL DAILY
Qty: 30 TABLET | Refills: 0
Start: 2023-12-21 | End: 2024-01-04

## 2023-12-21 RX ORDER — CEFTRIAXONE SODIUM 250 MG/1
2000 INJECTION, POWDER, FOR SOLUTION INTRAMUSCULAR; INTRAVENOUS EVERY 24 HOURS
Qty: 96 EACH | Refills: 0
Start: 2023-12-21 | End: 2024-01-02

## 2023-12-21 NOTE — PROGRESS NOTES
Post-Discharge Transitional Care Management Progress Note      Gerri Escoto   YOB: 1965    Date of Office Visit:  12/21/2023  Date of Hospital Admission: 12/15/2023  Date of Hospital Discharge: 12/19/2023    Care management risk score Rising risk (score 2-5) and Complex Care (Scores >=6): No Risk Score On File     Non face to face  following discharge, date last encounter closed (first attempt may have been earlier): *No documented post hospital discharge outreach found in the last 14 days *No documented post hospital discharge outreach found in the last 14 days    Call initiated 2 business days of discharge: *No response recorded in the last 14 days    ASSESSMENT/PLAN:   Hospital discharge follow-up  -     [place] DISCHARGE MEDS RECONCILED W/ CURRENT OUTPATIENT MED LIST    Medical Decision Making: moderate complexity  Return in 2 weeks (on 1/4/2024). Subjective:   HPI:  Follow up of Hospital problems/diagnosis(es): Septicemia, lactic acidosis. Did have a period of fever, fatigue, altering mental status was taken to the hospital found to have septicemia. Was given IV antibiotics x 2 to 3 days. At that point he started doing better and he no longer wanted to stay in hospital, they discharged him with PICC line and IV antibiotic orders. Home health has been also ordered. Inpatient course: Discharge summary reviewed- see chart. Interval history/Current status: Home health has come in and helps with drawing blood, setting up IV antibiotics. Daughter at side states she is administering IV Rocephin 2 g to him as home health does not come in every day. They were able to draw labs on Wednesday and he had CMP but no CBC today. Wounds look improved per family, still having some ulcerations warmth erythema already up to distal femur area. It is circumferential, not sparing posterior aspect.   He has no fever, chills, is able to ambulate states that he is feeling much improved, mental status is

## 2023-12-26 ENCOUNTER — TELEPHONE (OUTPATIENT)
Dept: FAMILY MEDICINE CLINIC | Age: 58
End: 2023-12-26

## 2023-12-26 NOTE — TELEPHONE ENCOUNTER
Wife calling, Chas's PICC line is coming out and they went to the ER and they stated they would have to call someone to come in and look at it but it was going to be at least an hour if not longer depending on their availability. Patient didn't want to wait so would not go to [place] to have put in. He has not had his IV medication since last week. Wife is worried because he won't get it checked. Writer suggested going back to the ER to see if they can put a new one in. Wife said she would call them a head a time to see if they could get them there to see him. Also wanted to let you know what is going on and if she needed to do anything else.

## 2023-12-27 ENCOUNTER — TELEPHONE (OUTPATIENT)
Dept: FAMILY MEDICINE CLINIC | Age: 58
End: 2023-12-27

## 2023-12-27 LAB
ANION GAP SERPL CALCULATED.3IONS-SCNC: 9.5 MMOL/L (ref 12–16)
BUN BLDV-MCNC: 22 MG/DL (ref 9–20)
CALCIUM SERPL-MCNC: 9.2 MG/DL (ref 8.4–10.2)
CHLORIDE BLD-SCNC: 99 MMOL/L (ref 98–120)
CO2: 30 MMOL/L (ref 22–31)
CREAT SERPL-MCNC: 0.8 MG/DL (ref 0.7–1.3)
GFR CALCULATED: > 60
GLUCOSE: 183 MG/DL (ref 75–110)
POTASSIUM SERPL-SCNC: 4.5 MMOL/L (ref 3.6–5)
SODIUM BLD-SCNC: 134 MMOL/L (ref 135–145)

## 2023-12-27 NOTE — TELEPHONE ENCOUNTER
Spoke with pts wife and they are heading to the hospital at this time to replace the picc line and get lab work.

## 2023-12-27 NOTE — TELEPHONE ENCOUNTER
Spoke with pts wife and she stated mich is very frustrated with the situation and not very cooperative. Thalia Reyna with [facility] also called and stated that she will call back as soon as she gets to there house at 9am this morning.

## 2023-12-27 NOTE — TELEPHONE ENCOUNTER
New picc line ordered and faxed to [facility]. Spoke with nahomi inocente wife and he is going there today to have it replaced as well as labs drawn.

## 2023-12-28 NOTE — TELEPHONE ENCOUNTER
Chas Cooper is requesting a refill on the following medication(s):  Requested Prescriptions     Pending Prescriptions Disp Refills    cyclobenzaprine (FLEXERIL) 10 MG tablet [Pharmacy Med Name: Cyclobenzaprine HCl 10 MG Oral Tablet] 30 tablet 0     Sig: Take 1 tablet by mouth three times daily as needed for muscle spasm       Last Visit Date (If Applicable):  2/8/2023    Next Visit Date:    5/8/2023    No/Not applicable

## 2024-01-02 ENCOUNTER — TELEPHONE (OUTPATIENT)
Dept: WOUND CARE | Age: 59
End: 2024-01-02

## 2024-01-02 NOTE — TELEPHONE ENCOUNTER
Patient's wife called to reschedule his appointment for Wound Care for today.  She states they have transportation problems.  She rescheduled to next Monday Wound Care 1/8/2024.

## 2024-01-03 LAB
ALBUMIN/GLOBULIN RATIO: 0.87 G/DL
ALBUMIN: 3.3 G/DL (ref 3.5–5)
ALP BLD-CCNC: 86 UNITS/L (ref 38–126)
ALT SERPL-CCNC: 34 UNITS/L (ref 4–50)
ANION GAP SERPL CALCULATED.3IONS-SCNC: 7.4 MMOL/L (ref 12–16)
AST SERPL-CCNC: 41 UNITS/L (ref 17–59)
BASOPHILS %: 1.94 (ref 0–3)
BASOPHILS ABSOLUTE: 0.11 (ref 0–0.3)
BILIRUB SERPL-MCNC: 0.5 MG/DL (ref 0.2–1.3)
BUN BLDV-MCNC: 18 MG/DL (ref 9–20)
CALCIUM SERPL-MCNC: 8 MG/DL (ref 8.4–10.2)
CHLORIDE BLD-SCNC: 106 MMOL/L (ref 98–120)
CO2: 23 MMOL/L (ref 22–31)
CREAT SERPL-MCNC: 0.7 MG/DL (ref 0.7–1.3)
EOSINOPHILS %: 4.73 (ref 0–10)
EOSINOPHILS ABSOLUTE: 0.27 (ref 0–1.1)
GFR CALCULATED: > 60
GLOBULIN: 3.8 G/DL
GLUCOSE: 137 MG/DL (ref 75–110)
HCT VFR BLD CALC: 27.3 % (ref 42–52)
HEMOGLOBIN: 8.2 (ref 13.8–17.8)
LYMPHOCYTE %: 36.79 (ref 20–51.1)
LYMPHOCYTES ABSOLUTE: 2.06 (ref 1–5.5)
MCH RBC QN AUTO: 26.4 PG (ref 28.5–32.5)
MCHC RBC AUTO-ENTMCNC: 30.1 G/DL (ref 32–37)
MCV RBC AUTO: 87.8 FL (ref 80–94)
MONOCYTES %: 7.07 (ref 1.7–9.3)
MONOCYTES ABSOLUTE: 0.4 (ref 0.1–1)
NEUTROPHILS %: 49.47 (ref 42.2–75.2)
NEUTROPHILS ABSOLUTE: 2.78 (ref 2–8.1)
PDW BLD-RTO: 13.6 % (ref 10–15.5)
PLATELET # BLD: 168.6 THOU/MM3 (ref 130–400)
POTASSIUM SERPL-SCNC: 4.2 MMOL/L (ref 3.6–5)
RBC: 3.11 M/UL (ref 4.7–6.1)
SODIUM BLD-SCNC: 136 MMOL/L (ref 135–145)
TOTAL PROTEIN, SERUM: 7.1 G/DL (ref 6.3–8.2)
WBC: 5.6 THOU/ML3 (ref 4.8–10.8)

## 2024-01-04 ENCOUNTER — OFFICE VISIT (OUTPATIENT)
Dept: FAMILY MEDICINE CLINIC | Age: 59
End: 2024-01-04
Payer: MEDICARE

## 2024-01-04 VITALS
HEIGHT: 72 IN | DIASTOLIC BLOOD PRESSURE: 80 MMHG | HEART RATE: 110 BPM | OXYGEN SATURATION: 95 % | BODY MASS INDEX: 42.66 KG/M2 | WEIGHT: 315 LBS | SYSTOLIC BLOOD PRESSURE: 128 MMHG

## 2024-01-04 DIAGNOSIS — H60.391 OTHER INFECTIVE ACUTE OTITIS EXTERNA OF RIGHT EAR: Primary | ICD-10-CM

## 2024-01-04 DIAGNOSIS — R60.0 LOWER LEG EDEMA: ICD-10-CM

## 2024-01-04 DIAGNOSIS — E11.51 TYPE 2 DIABETES MELLITUS WITH DIABETIC PERIPHERAL ANGIOPATHY WITHOUT GANGRENE, WITH LONG-TERM CURRENT USE OF INSULIN (HCC): ICD-10-CM

## 2024-01-04 DIAGNOSIS — L97.921 SKIN ULCER OF LEFT LOWER LEG, LIMITED TO BREAKDOWN OF SKIN (HCC): ICD-10-CM

## 2024-01-04 DIAGNOSIS — L03.116 CELLULITIS OF LEFT LOWER LEG: ICD-10-CM

## 2024-01-04 DIAGNOSIS — Z79.4 TYPE 2 DIABETES MELLITUS WITH DIABETIC PERIPHERAL ANGIOPATHY WITHOUT GANGRENE, WITH LONG-TERM CURRENT USE OF INSULIN (HCC): ICD-10-CM

## 2024-01-04 PROCEDURE — 99214 OFFICE O/P EST MOD 30 MIN: CPT

## 2024-01-04 PROCEDURE — 99212 OFFICE O/P EST SF 10 MIN: CPT

## 2024-01-04 PROCEDURE — 3074F SYST BP LT 130 MM HG: CPT

## 2024-01-04 PROCEDURE — 3079F DIAST BP 80-89 MM HG: CPT

## 2024-01-04 RX ORDER — CEPHALEXIN 500 MG/1
500 CAPSULE ORAL 4 TIMES DAILY
Qty: 28 CAPSULE | Refills: 0 | Status: SHIPPED | OUTPATIENT
Start: 2024-01-04 | End: 2024-01-08

## 2024-01-04 RX ORDER — POTASSIUM CHLORIDE 750 MG/1
10 TABLET, FILM COATED, EXTENDED RELEASE ORAL DAILY
Qty: 90 TABLET | Refills: 0 | Status: SHIPPED | OUTPATIENT
Start: 2024-01-04

## 2024-01-04 RX ORDER — GLIPIZIDE 2.5 MG/1
2.5 TABLET, EXTENDED RELEASE ORAL DAILY
Qty: 90 TABLET | Refills: 0 | Status: SHIPPED | OUTPATIENT
Start: 2024-01-04

## 2024-01-04 RX ORDER — SULFAMETHOXAZOLE AND TRIMETHOPRIM 800; 160 MG/1; MG/1
1 TABLET ORAL 2 TIMES DAILY
Qty: 14 TABLET | Refills: 0 | Status: SHIPPED | OUTPATIENT
Start: 2024-01-04 | End: 2024-01-08

## 2024-01-04 RX ORDER — FUROSEMIDE 40 MG/1
40 TABLET ORAL DAILY
Qty: 90 TABLET | Refills: 0 | Status: SHIPPED | OUTPATIENT
Start: 2024-01-04 | End: 2024-01-08

## 2024-01-04 ASSESSMENT — PATIENT HEALTH QUESTIONNAIRE - PHQ9
SUM OF ALL RESPONSES TO PHQ QUESTIONS 1-9: 0
1. LITTLE INTEREST OR PLEASURE IN DOING THINGS: 0
2. FEELING DOWN, DEPRESSED OR HOPELESS: 0
SUM OF ALL RESPONSES TO PHQ9 QUESTIONS 1 & 2: 0
SUM OF ALL RESPONSES TO PHQ QUESTIONS 1-9: 0

## 2024-01-04 NOTE — PROGRESS NOTES
notify office if he notices any bleeding at all.  Will start on Bactrim and Keflex until he sees wound care.  Side effects this medication are discussed    Bactrim and Keflex, reduced dose of potassium side, reduced dose of Lasix Cortisporin otic drops medication sent to the pharmacy.  Discussed medication desired effects, potential side effects, and how to take the medication.  Encouraged symptomatic treatment, rest, increase oral fluid intake.  Follow-up for worsening or persistent symptoms.  Patient verbalizes understanding regarding plan of care and all questions answered.    No follow-ups on file.     Subjective:   History of Present Illness:  -Status post septicemia and IV therapy with Rocephin.  Last dose of Rocephin today.  Does have peripherally inserted central catheter and left upper arm.  Left lower leg with some ulcerations and open wounds with dressing over.  Does have home health.  Denies fever, chills.  Does state he does have some serous sang and serous drainage on dressing.  No redness or erythema around PICC line.  No fever, chills, redness, itching, or allergic reaction symptoms noted.  Does have some ear pain right-sided that is ongoing for approximately a week or 2      Current Outpatient Medications   Medication Sig Dispense Refill    glipiZIDE (GLUCOTROL XL) 2.5 MG extended release tablet Take 1 tablet by mouth daily 90 tablet 0    neomycin-polymyxin-hydrocortisone (CORTISPORIN) 3.5-62777-4 otic solution Place 3 drops into the right ear 4 times daily for 7 days 1 each 0    potassium chloride (KLOR-CON) 10 MEQ extended release tablet Take 1 tablet by mouth daily 90 tablet 0    famotidine (PEPCID) 20 MG tablet Take 1 tablet by mouth twice daily (Patient not taking: Reported on 1/8/2024) 180 tablet 0    cyclobenzaprine (FLEXERIL) 10 MG tablet Take 1 tablet by mouth three times daily as needed for muscle spasm (Patient not taking: Reported on 1/8/2024) 90 tablet 0    metFORMIN (GLUCOPHAGE) 1000

## 2024-01-08 ENCOUNTER — HOSPITAL ENCOUNTER (OUTPATIENT)
Dept: WOUND CARE | Age: 59
Discharge: HOME | End: 2024-01-09
Attending: PODIATRIST
Payer: MEDICARE

## 2024-01-08 VITALS
TEMPERATURE: 97.2 F | HEIGHT: 72 IN | RESPIRATION RATE: 16 BRPM | HEART RATE: 82 BPM | DIASTOLIC BLOOD PRESSURE: 82 MMHG | SYSTOLIC BLOOD PRESSURE: 128 MMHG | WEIGHT: 315 LBS | BODY MASS INDEX: 42.66 KG/M2

## 2024-01-08 DIAGNOSIS — E11.51 CONTROLLED TYPE 2 DM WITH PERIPHERAL CIRCULATORY DISORDER (HCC): Primary | ICD-10-CM

## 2024-01-08 PROCEDURE — 99213 OFFICE O/P EST LOW 20 MIN: CPT

## 2024-01-08 PROCEDURE — 29581 APPL MULTLAYER CMPRN SYS LEG: CPT | Performed by: PODIATRIST

## 2024-01-08 PROCEDURE — 99213 OFFICE O/P EST LOW 20 MIN: CPT | Performed by: PODIATRIST

## 2024-01-08 PROCEDURE — 29581 APPL MULTLAYER CMPRN SYS LEG: CPT

## 2024-01-08 RX ORDER — LIDOCAINE HYDROCHLORIDE 40 MG/ML
SOLUTION TOPICAL ONCE
OUTPATIENT
Start: 2024-01-08 | End: 2024-01-08

## 2024-01-08 RX ORDER — LIDOCAINE HYDROCHLORIDE 20 MG/ML
JELLY TOPICAL ONCE
OUTPATIENT
Start: 2024-01-08 | End: 2024-01-08

## 2024-01-08 RX ORDER — BETAMETHASONE DIPROPIONATE 0.5 MG/G
CREAM TOPICAL ONCE
OUTPATIENT
Start: 2024-01-08 | End: 2024-01-08

## 2024-01-08 RX ORDER — BACITRACIN ZINC AND POLYMYXIN B SULFATE 500; 1000 [USP'U]/G; [USP'U]/G
OINTMENT TOPICAL ONCE
OUTPATIENT
Start: 2024-01-08 | End: 2024-01-08

## 2024-01-08 RX ORDER — IBUPROFEN 200 MG
TABLET ORAL ONCE
OUTPATIENT
Start: 2024-01-08 | End: 2024-01-08

## 2024-01-08 RX ORDER — SODIUM CHLOR/HYPOCHLOROUS ACID 0.033 %
SOLUTION, IRRIGATION IRRIGATION ONCE
OUTPATIENT
Start: 2024-01-08 | End: 2024-01-08

## 2024-01-08 RX ORDER — GENTAMICIN SULFATE 1 MG/G
OINTMENT TOPICAL ONCE
OUTPATIENT
Start: 2024-01-08 | End: 2024-01-08

## 2024-01-08 RX ORDER — CLOBETASOL PROPIONATE 0.5 MG/G
OINTMENT TOPICAL ONCE
OUTPATIENT
Start: 2024-01-08 | End: 2024-01-08

## 2024-01-08 RX ORDER — LIDOCAINE 50 MG/G
OINTMENT TOPICAL ONCE
OUTPATIENT
Start: 2024-01-08 | End: 2024-01-08

## 2024-01-08 RX ORDER — GINSENG 100 MG
CAPSULE ORAL ONCE
OUTPATIENT
Start: 2024-01-08 | End: 2024-01-08

## 2024-01-08 RX ORDER — TRIAMCINOLONE ACETONIDE 1 MG/G
OINTMENT TOPICAL ONCE
OUTPATIENT
Start: 2024-01-08 | End: 2024-01-08

## 2024-01-08 RX ORDER — LIDOCAINE 40 MG/G
CREAM TOPICAL ONCE
OUTPATIENT
Start: 2024-01-08 | End: 2024-01-08

## 2024-01-08 ASSESSMENT — PAIN SCALES - GENERAL: PAINLEVEL_OUTOF10: 0

## 2024-01-08 NOTE — PROGRESS NOTES
Multilayer Compression Wrap   (Not Unna) Below the Knee    NAME:  Chas Cooper  YOB: 1965  MEDICAL RECORD NUMBER:  [id number]  DATE:  1/8/2024    Multilayer compression wrap: Removed old Multilayer wrap if indicated and wash leg with mild soap/water.  Applied moisturizing agent to dry skin as needed.   Applied primary and secondary dressing as ordered.  Applied multilayered dressing below the knee to left lower leg.  Instructed patient/caregiver not to remove dressing and to keep it clean and dry.   Instructed patient/caregiver on complications to report to provider, such as pain, numbness in toes, heavy drainage, and slippage of dressing.  Instructed patient on purpose of compression dressing and on activity and exercise recommendations.      Electronically signed by Naomie Pena RN on 1/8/2024 at 10:16 AM

## 2024-01-08 NOTE — PLAN OF CARE
Problem: Chronic Conditions and Co-morbidities  Goal: Patient's chronic conditions and co-morbidity symptoms are monitored and maintained or improved  Outcome: Progressing     Problem: Cognitive:  Goal: Knowledge of wound care  Description: Knowledge of wound care  Outcome: Progressing  Goal: Understands risk factors for wounds  Description: Understands risk factors for wounds  Outcome: Progressing     Problem: Wound:  Goal: Will show signs of wound healing; wound closure and no evidence of infection  Description: Will show signs of wound healing; wound closure and no evidence of infection  Outcome: Progressing     Problem: Smoking cessation:  Goal: Ability to formulate a plan to maintain a tobacco-free life will be supported  Description: Ability to formulate a plan to maintain a tobacco-free life will be supported  Outcome: Progressing     Problem: Compression therapy:  Goal: Will be free from complications associated with compression therapy  Description: Will be free from complications associated with compression therapy  Outcome: Progressing     Problem: Weight control:  Goal: Ability to maintain an optimal weight for height and age will be supported  Description: Ability to maintain an optimal weight for height and age will be supported  Outcome: Progressing     Problem: Falls - Risk of:  Goal: Will remain free from falls  Description: Will remain free from falls  Outcome: Progressing     Problem: Blood Glucose:  Goal: Ability to maintain appropriate glucose levels will improve  Description: Ability to maintain appropriate glucose levels will improve  Outcome: Progressing

## 2024-01-08 NOTE — DISCHARGE INSTRUCTIONS
Today a compression dressing has been applied to your lower leg. Its purpose is to reduce swelling and help treat your wound. It will stay on until your next appointment.  1. It must stay dry. You can shower with a bag covering it or purchase a shower boot at  a medical supply store.  2. If the dressing falls more than 2 inches or gets wet, call us ([phone]) to come in  to have it changed.  3. If the top or bottom rolls, you can snip through it to relieve the constriction.  4. To help reduce swelling, when sitting elevate your leg, preferably to heart level.   Avoid standing in one place for long periods of time.  5.  A rare complication is a decrease of arterial blood flow to your toes. If your   leg becomes more painful with numbness or tingling or a purple color to your toes,  carefully remove the dressing by cutting it off or unwrapping it. If normal sensation does not return to the limb, seek medical help.      Follow up with Dr Walden in one week.     SIGNS OF INFECTION  - Redness, swelling, skin hot  - Wound bed turns black or stringy yellow  - Foul odor  - Increased drainage or pus  - Increased pain  - Fever greater than 100F    CALL YOUR DOCTOR OR SEEK MEDICAL ATTENTION IF SIGNS OF INFECTION.  DO NOT WAIT UNTIL YOUR NEXT APPOINTMENT    Call the Wound Care Nurse with any other questions or concerns- [phone]

## 2024-01-09 ASSESSMENT — ENCOUNTER SYMPTOMS
SHORTNESS OF BREATH: 0
COUGH: 1
WHEEZING: 0
ABDOMINAL PAIN: 0
CHEST TIGHTNESS: 0

## 2024-01-09 NOTE — ASSESSMENT & PLAN NOTE
Uncontrolled, changes made today: Start Glucotrol XL 2.5 mg extended release tablets 1 tablet by mouth daily.  Do need to get better handle on blood sugar as this likely will exacerbate his cellulitis and make wounds more susceptible to infection causing septicemia and possibly death.  Side effects and hypoglycemia are discussed.  He does use a continuous blood glucose monitor with alarms and parameters set.

## 2024-01-09 NOTE — ASSESSMENT & PLAN NOTE
Dressing over left lower leg changed with new iodoform and gauze wrap.  Hibiclens soap utilized to wash leg, given bottle to take home to also wash leg between dressing changes.  Does have home health and is going to see wound care on Monday.

## 2024-01-15 ENCOUNTER — HOSPITAL ENCOUNTER (OUTPATIENT)
Dept: WOUND CARE | Age: 59
Discharge: HOME | End: 2024-01-16
Attending: PODIATRIST
Payer: MEDICARE

## 2024-01-15 VITALS
SYSTOLIC BLOOD PRESSURE: 128 MMHG | DIASTOLIC BLOOD PRESSURE: 80 MMHG | TEMPERATURE: 97.3 F | HEART RATE: 80 BPM | RESPIRATION RATE: 16 BRPM

## 2024-01-15 DIAGNOSIS — I87.2 VENOUS INSUFFICIENCY (CHRONIC) (PERIPHERAL): ICD-10-CM

## 2024-01-15 DIAGNOSIS — I89.0 LYMPHEDEMA OF BOTH LOWER EXTREMITIES: ICD-10-CM

## 2024-01-15 DIAGNOSIS — M62.830 BACK MUSCLE SPASM: ICD-10-CM

## 2024-01-15 DIAGNOSIS — G47.33 OSA ON CPAP: ICD-10-CM

## 2024-01-15 DIAGNOSIS — M19.90 ARTHRITIS: ICD-10-CM

## 2024-01-15 DIAGNOSIS — E78.2 MIXED HYPERLIPIDEMIA: ICD-10-CM

## 2024-01-15 DIAGNOSIS — E66.01 MORBID OBESITY (HCC): ICD-10-CM

## 2024-01-15 DIAGNOSIS — E11.8 TYPE 2 DIABETES MELLITUS WITH COMPLICATION, WITHOUT LONG-TERM CURRENT USE OF INSULIN (HCC): ICD-10-CM

## 2024-01-15 DIAGNOSIS — J42 CHRONIC BRONCHITIS, UNSPECIFIED CHRONIC BRONCHITIS TYPE (HCC): ICD-10-CM

## 2024-01-15 DIAGNOSIS — I10 ESSENTIAL HYPERTENSION: ICD-10-CM

## 2024-01-15 DIAGNOSIS — M16.11 PRIMARY OSTEOARTHRITIS OF RIGHT HIP: ICD-10-CM

## 2024-01-15 DIAGNOSIS — R21 RASH OF BACK: ICD-10-CM

## 2024-01-15 DIAGNOSIS — M26.629 TEMPOROMANDIBULAR JOINT-PAIN-DYSFUNCTION SYNDROME (TMJ): ICD-10-CM

## 2024-01-15 DIAGNOSIS — F17.200 SMOKING: ICD-10-CM

## 2024-01-15 PROCEDURE — 29581 APPL MULTLAYER CMPRN SYS LEG: CPT | Performed by: PODIATRIST

## 2024-01-15 PROCEDURE — 29581 APPL MULTLAYER CMPRN SYS LEG: CPT

## 2024-01-15 RX ORDER — CYCLOBENZAPRINE HCL 10 MG
TABLET ORAL
Qty: 90 TABLET | Refills: 0 | Status: SHIPPED | OUTPATIENT
Start: 2024-01-15

## 2024-01-15 NOTE — PROGRESS NOTES
Multilayer Compression Wrap   (Not Unna) Below the Knee    NAME:  Chas Cooper  YOB: 1965  MEDICAL RECORD NUMBER:  [id number]  DATE:  1/15/2024    Multilayer compression wrap: Removed old Multilayer wrap if indicated and wash leg with mild soap/water.  Applied moisturizing agent to dry skin as needed.   Applied primary and secondary dressing as ordered.  Applied multilayered dressing below the knee to left lower leg.  Instructed patient/caregiver not to remove dressing and to keep it clean and dry.   Instructed patient/caregiver on complications to report to provider, such as pain, numbness in toes, heavy drainage, and slippage of dressing.  Instructed patient on purpose of compression dressing and on activity and exercise recommendations.      Electronically signed by Naomie Pena RN on 1/15/2024 at 2:20 PM   
importance of this in regards to wound healing.   Compression: Patient had a multi-layer compression wrap applied to the Left lower extremities.  Patient educated on appropriate use and will keep the dressing dry and intact.  Any increase in pain or if the dressing should fall down or slip, they should contact the clinic immediately.  Also advised importance of continued elevation of the leg.   Today's dressing:foam  Contact clinic with any questions/problems/concerns or new signs of infection. Follow-up at [facility] in 1week(s).

## 2024-01-15 NOTE — DISCHARGE INSTRUCTIONS
Follow up next week as scheduled.    Today a compression dressing has been applied to your lower leg. Its purpose is to reduce swelling and help treat your wound. It will stay on until your next appointment.  1. It must stay dry. You can shower with a bag covering it or purchase a shower boot at  a medical supply store.  2. If the dressing falls more than 2 inches or gets wet, call us ([phone]) to come in  to have it changed.  3. If the top or bottom rolls, you can snip through it to relieve the constriction.  4. To help reduce swelling, when sitting elevate your leg, preferably to heart level.   Avoid standing in one place for long periods of time.  5.  A rare complication is a decrease of arterial blood flow to your toes. If your   leg becomes more painful with numbness or tingling or a purple color to your toes,  carefully remove the dressing by cutting it off or unwrapping it. If normal sensation does not return to the limb, seek medical help.

## 2024-01-15 NOTE — TELEPHONE ENCOUNTER
Chas Cooper is requesting a refill on the following medication(s):  Requested Prescriptions     Pending Prescriptions Disp Refills    cyclobenzaprine (FLEXERIL) 10 MG tablet [Pharmacy Med Name: Cyclobenzaprine HCl 10 MG Oral Tablet] 90 tablet 0     Sig: Take 1 tablet by mouth three times daily as needed for muscle spasm       Last Visit Date (If Applicable):  1/4/2024    Next Visit Date:    2/8/2024

## 2024-01-22 ENCOUNTER — HOSPITAL ENCOUNTER (OUTPATIENT)
Dept: WOUND CARE | Age: 59
Discharge: HOME | End: 2024-01-23
Attending: PODIATRIST
Payer: MEDICARE

## 2024-01-22 VITALS
HEART RATE: 104 BPM | RESPIRATION RATE: 20 BRPM | HEIGHT: 72 IN | TEMPERATURE: 98.1 F | WEIGHT: 315 LBS | SYSTOLIC BLOOD PRESSURE: 130 MMHG | BODY MASS INDEX: 42.66 KG/M2 | DIASTOLIC BLOOD PRESSURE: 60 MMHG

## 2024-01-22 DIAGNOSIS — L97.921 SKIN ULCER OF LEFT LOWER LEG, LIMITED TO BREAKDOWN OF SKIN (HCC): Primary | ICD-10-CM

## 2024-01-22 PROCEDURE — 99213 OFFICE O/P EST LOW 20 MIN: CPT | Performed by: PODIATRIST

## 2024-01-22 PROCEDURE — 99213 OFFICE O/P EST LOW 20 MIN: CPT

## 2024-01-22 RX ORDER — CLOBETASOL PROPIONATE 0.5 MG/G
OINTMENT TOPICAL ONCE
Status: CANCELLED | OUTPATIENT
Start: 2024-01-22 | End: 2024-01-22

## 2024-01-22 RX ORDER — BACITRACIN ZINC AND POLYMYXIN B SULFATE 500; 1000 [USP'U]/G; [USP'U]/G
OINTMENT TOPICAL ONCE
Status: CANCELLED | OUTPATIENT
Start: 2024-01-22 | End: 2024-01-22

## 2024-01-22 RX ORDER — LIDOCAINE HYDROCHLORIDE 20 MG/ML
JELLY TOPICAL ONCE
Status: CANCELLED | OUTPATIENT
Start: 2024-01-22 | End: 2024-01-22

## 2024-01-22 RX ORDER — GINSENG 100 MG
CAPSULE ORAL ONCE
Status: CANCELLED | OUTPATIENT
Start: 2024-01-22 | End: 2024-01-22

## 2024-01-22 RX ORDER — SODIUM CHLOR/HYPOCHLOROUS ACID 0.033 %
SOLUTION, IRRIGATION IRRIGATION ONCE
Status: CANCELLED | OUTPATIENT
Start: 2024-01-22 | End: 2024-01-22

## 2024-01-22 RX ORDER — GENTAMICIN SULFATE 1 MG/G
OINTMENT TOPICAL ONCE
Status: CANCELLED | OUTPATIENT
Start: 2024-01-22 | End: 2024-01-22

## 2024-01-22 RX ORDER — LIDOCAINE 50 MG/G
OINTMENT TOPICAL ONCE
Status: CANCELLED | OUTPATIENT
Start: 2024-01-22 | End: 2024-01-22

## 2024-01-22 RX ORDER — IBUPROFEN 200 MG
TABLET ORAL ONCE
Status: CANCELLED | OUTPATIENT
Start: 2024-01-22 | End: 2024-01-22

## 2024-01-22 RX ORDER — LIDOCAINE HYDROCHLORIDE 40 MG/ML
SOLUTION TOPICAL ONCE
Status: CANCELLED | OUTPATIENT
Start: 2024-01-22 | End: 2024-01-22

## 2024-01-22 RX ORDER — TRIAMCINOLONE ACETONIDE 1 MG/G
OINTMENT TOPICAL ONCE
Status: CANCELLED | OUTPATIENT
Start: 2024-01-22 | End: 2024-01-22

## 2024-01-22 RX ORDER — BETAMETHASONE DIPROPIONATE 0.5 MG/G
CREAM TOPICAL ONCE
Status: CANCELLED | OUTPATIENT
Start: 2024-01-22 | End: 2024-01-22

## 2024-01-22 RX ORDER — LIDOCAINE 40 MG/G
CREAM TOPICAL ONCE
Status: CANCELLED | OUTPATIENT
Start: 2024-01-22 | End: 2024-01-22

## 2024-01-22 ASSESSMENT — PAIN SCALES - GENERAL: PAINLEVEL_OUTOF10: 0

## 2024-01-22 NOTE — PROGRESS NOTES
MD   NYSTATIN [id number] UNIT/GM powder  3/30/21   Talha Lanier MD   Multiple Minerals-Vitamins (CALCIUM-MAGNESIUM-ZINC-D3) TABS Take by mouth    Talha Lanier MD   buPROPion (WELLBUTRIN SR) 150 MG extended release tablet Take 1 tablet by mouth daily  Patient not taking: Reported on 1/8/2024 7/16/20   Carson Reid MD   triamcinolone (ARISTOCORT) 0.5 % cream Apply topically 3 times daily.  Patient not taking: Reported on 1/8/2024 7/16/20   Carson Reid MD   aspirin 81 MG tablet Take 1 tablet by mouth daily    Talha Lanier MD   docusate sodium (COLACE) 100 MG capsule TAKE ONE CAPSULE BY MOUTH TWICE DAILY 12/17/19   Talha Lanier MD   mupirocin (BACTROBAN) 2 % ointment APPLY A SMALL AMOUNT TO EACH NOSTRIL TWICE DAILY STARTING 5 DAYS PRIOR TO SURGERY  Patient not taking: Reported on 1/8/2024 12/4/19   Talha Lanier MD   Acetaminophen (TYLENOL ARTHRITIS PAIN PO) Take 1 tablet by mouth as needed    Talha Lanier MD   CPAP Machine MISC by Does not apply route    Talha Lanier MD       Social History     Tobacco Use    Smoking status: Every Day     Current packs/day: 0.37     Types: Cigarettes    Smokeless tobacco: Never    Tobacco comments:     Patient trying to cut down.   Substance Use Topics    Alcohol use: No       Review of Systems: All 12 systems reviewed and pertinent positives noted above.    Lower Extremity Physical Examination:     Vitals:   Vitals:    01/22/24 1300   BP: 130/60   Pulse: (!) 104   Resp: 20   Temp: 98.1 °F (36.7 °C)     General: AAO x 3 in NAD.     Vascular: DP and PT pulses palpable 2/4, bilateral.  CFT <3 seconds, bilateral.  Hair growth absent to the level of the digits, bilateral.  Edema present, bilateral.  Varicosities present, bilateral. Erythema absent, bilateral. Distal Rubor absent bilateral.  Temperature decreased bilateral. Hyperpigmentation present bilateral. Atrophic skin yes.     Neurological: Sensation intact to light

## 2024-01-22 NOTE — PLAN OF CARE
Problem: Chronic Conditions and Co-morbidities  Goal: Patient's chronic conditions and co-morbidity symptoms are monitored and maintained or improved  Outcome: Progressing     Problem: Cognitive:  Goal: Knowledge of wound care  Description: Knowledge of wound care  Outcome: Progressing  Goal: Understands risk factors for wounds  Description: Understands risk factors for wounds  Outcome: Progressing     Problem: Wound:  Goal: Will show signs of wound healing; wound closure and no evidence of infection  Description: Will show signs of wound healing; wound closure and no evidence of infection  Outcome: Progressing     Problem: Venous:  Goal: Signs of wound healing will improve  Description: Signs of wound healing will improve  Outcome: Progressing     Problem: Smoking cessation:  Goal: Ability to formulate a plan to maintain a tobacco-free life will be supported  Description: Ability to formulate a plan to maintain a tobacco-free life will be supported  Outcome: Progressing     Problem: Compression therapy:  Goal: Will be free from complications associated with compression therapy  Description: Will be free from complications associated with compression therapy  Outcome: Progressing     Problem: Weight control:  Goal: Ability to maintain an optimal weight for height and age will be supported  Description: Ability to maintain an optimal weight for height and age will be supported  Outcome: Progressing     Problem: Falls - Risk of:  Goal: Will remain free from falls  Description: Will remain free from falls  Outcome: Progressing     Problem: Blood Glucose:  Goal: Ability to maintain appropriate glucose levels will improve  Description: Ability to maintain appropriate glucose levels will improve  Outcome: Progressing

## 2024-02-08 ENCOUNTER — OFFICE VISIT (OUTPATIENT)
Dept: FAMILY MEDICINE CLINIC | Age: 59
End: 2024-02-08
Payer: MEDICARE

## 2024-02-08 VITALS
DIASTOLIC BLOOD PRESSURE: 84 MMHG | HEART RATE: 99 BPM | WEIGHT: 315 LBS | HEIGHT: 72 IN | SYSTOLIC BLOOD PRESSURE: 124 MMHG | OXYGEN SATURATION: 97 % | BODY MASS INDEX: 42.66 KG/M2

## 2024-02-08 DIAGNOSIS — J42 CHRONIC BRONCHITIS, UNSPECIFIED CHRONIC BRONCHITIS TYPE (HCC): ICD-10-CM

## 2024-02-08 DIAGNOSIS — Z87.11 HISTORY OF PEPTIC ULCER DISEASE: ICD-10-CM

## 2024-02-08 DIAGNOSIS — Z12.12 ENCOUNTER FOR COLORECTAL CANCER SCREENING: ICD-10-CM

## 2024-02-08 DIAGNOSIS — Z00.00 MEDICARE ANNUAL WELLNESS VISIT, SUBSEQUENT: Primary | ICD-10-CM

## 2024-02-08 DIAGNOSIS — K21.9 GASTROESOPHAGEAL REFLUX DISEASE WITHOUT ESOPHAGITIS: ICD-10-CM

## 2024-02-08 DIAGNOSIS — L97.921 SKIN ULCER OF LEFT LOWER LEG, LIMITED TO BREAKDOWN OF SKIN (HCC): ICD-10-CM

## 2024-02-08 DIAGNOSIS — E66.01 MORBID OBESITY (HCC): ICD-10-CM

## 2024-02-08 DIAGNOSIS — Z79.4 TYPE 2 DIABETES MELLITUS WITH DIABETIC PERIPHERAL ANGIOPATHY WITHOUT GANGRENE, WITH LONG-TERM CURRENT USE OF INSULIN (HCC): ICD-10-CM

## 2024-02-08 DIAGNOSIS — I87.2 VENOUS INSUFFICIENCY (CHRONIC) (PERIPHERAL): ICD-10-CM

## 2024-02-08 DIAGNOSIS — R11.10 DRY HEAVES: ICD-10-CM

## 2024-02-08 DIAGNOSIS — E87.1 HYPONATREMIA: ICD-10-CM

## 2024-02-08 DIAGNOSIS — I89.0 LYMPHEDEMA OF BOTH LOWER EXTREMITIES: ICD-10-CM

## 2024-02-08 DIAGNOSIS — Z12.11 ENCOUNTER FOR COLORECTAL CANCER SCREENING: ICD-10-CM

## 2024-02-08 DIAGNOSIS — D64.9 ANEMIA, UNSPECIFIED TYPE: ICD-10-CM

## 2024-02-08 DIAGNOSIS — F17.200 SMOKER: ICD-10-CM

## 2024-02-08 DIAGNOSIS — E11.51 TYPE 2 DIABETES MELLITUS WITH DIABETIC PERIPHERAL ANGIOPATHY WITHOUT GANGRENE, WITH LONG-TERM CURRENT USE OF INSULIN (HCC): ICD-10-CM

## 2024-02-08 PROCEDURE — G0439 PPPS, SUBSEQ VISIT: HCPCS

## 2024-02-08 PROCEDURE — 99212 OFFICE O/P EST SF 10 MIN: CPT

## 2024-02-08 PROCEDURE — 3074F SYST BP LT 130 MM HG: CPT

## 2024-02-08 PROCEDURE — 3079F DIAST BP 80-89 MM HG: CPT

## 2024-02-08 RX ORDER — OMEPRAZOLE 40 MG/1
CAPSULE, DELAYED RELEASE ORAL
Qty: 90 CAPSULE | Refills: 0 | Status: SHIPPED | OUTPATIENT
Start: 2024-02-08

## 2024-02-08 NOTE — PROGRESS NOTES
MEALS Yes Jamel Buckner APRN - CNP   atorvastatin (LIPITOR) 40 MG tablet Take 1 tablet by mouth once daily Yes Jamel Buckner APRN - CNP   lisinopril (PRINIVIL;ZESTRIL) 20 MG tablet Take 1 tablet by mouth once daily Yes Jamel Buckner APRN - CNP   Continuous Blood Gluc Sensor (DEXCOM G4 SENSOR) MISC 1 each by Does not apply route daily Monitor Blood sugar at least 4 times a day, on insulin. Yes Jamel Buckner APRN - CNP   Continuous Blood Gluc Transmit (DEXCOM G4 PLATINUM TRANSMITTER) MISC 1 each by Does not apply route daily Monitor Blood sugar at least 4 times a day, on insulin. Yes Jamel Buckner APRN - CNP   BASAGLAR KWIKPEN 100 UNIT/ML injection pen Inject 60 units subcutaneously in the morning and 20 units at bedtime. Yes Jamel Buckner APRN - CNP   Handicap Placard MISC by Does not apply route The disability is expected to last 5 years  Dx: DM type 2 with neuropathy, Lymphadenia, Yes Jamel Buckner APRN - CNP   Continuous Blood Gluc  (DEXCOM G4 PLATINUM ) NORRIS 1 box by Does not apply route daily Monitor Blood sugar at least 4 times a day, on insulin. Yes Jamel Buckner APRN - CNP   NOVOLOG FLEXPEN 100 UNIT/ML injection pen INJECT UNITS SUBCUTANEOUSLY BEFORE MEAL(S) AND AT BEDTIME, IF SUGAR LESS THAN 175 INJECT O UNITS, 175-250 2 UNITS, 251-300 4 UNITS, 301-350 6 UNITS, -400 8 UNITS. Yes Mi Wright APRN - CNP   RELION PEN NEEDLES 31G X 6 MM MISC USE AS DIRECTED Yes Talha Lanier MD   Blood Glucose Monitoring Suppl (ONE TOUCH ULTRA 2) w/Device KIT USE AS DIRECTED Yes Talha Lanier MD   blood glucose monitor kit and supplies Dispense sufficient amount for indicated testing frequency. Dispense all needed supplies to include: monitor, strips, lancing device, lancets, control solutions, alcohol swabs. Yes Carson Reid MD   Lancets MISC 1 each by Does not apply route 2 times daily Yes Carson Reid MD   blood glucose monitor strips Test 2 times a day & as

## 2024-02-08 NOTE — PATIENT INSTRUCTIONS
have symptoms of a heart attack. These may include:    Chest pain or pressure, or a strange feeling in the chest.     Sweating.     Shortness of breath.     Pain, pressure, or a strange feeling in the back, neck, jaw, or upper belly or in one or both shoulders or arms.     Lightheadedness or sudden weakness.     A fast or irregular heartbeat.   After you call 911, the  may tell you to chew 1 adult-strength or 2 to 4 low-dose aspirin. Wait for an ambulance. Do not try to drive yourself.  Watch closely for changes in your health, and be sure to contact your doctor if you have any problems.  Where can you learn more?  Go to https://[email or web address].net/patientEd and enter F075 to learn more about \"A Healthy Heart: Care Instructions.\"  Current as of: June 25, 2023               Content Version: 13.9  © 0163-8435 [employer].   Care instructions adapted under license by [employer]. If you have questions about a medical condition or this instruction, always ask your healthcare professional. [employer] disclaims any warranty or liability for your use of this information.      Personalized Preventive Plan for Chas Cooper - 2/8/2024  Medicare offers a range of preventive health benefits. Some of the tests and screenings are paid in full while other may be subject to a deductible, co-insurance, and/or copay.    Some of these benefits include a comprehensive review of your medical history including lifestyle, illnesses that may run in your family, and various assessments and screenings as appropriate.    After reviewing your medical record and screening and assessments performed today your provider may have ordered immunizations, labs, imaging, and/or referrals for you.  A list of these orders (if applicable) as well as your Preventive Care list are included within your After Visit Summary for your review.    Other Preventive Recommendations:    A preventive eye exam performed by an eye

## 2024-02-08 NOTE — TELEPHONE ENCOUNTER
Chas Cooper is requesting a refill on the following medication(s):  Requested Prescriptions     Pending Prescriptions Disp Refills    omeprazole (PRILOSEC) 40 MG delayed release capsule [Pharmacy Med Name: Omeprazole 40 MG Oral Capsule Delayed Release] 90 capsule 0     Sig: TAKE 1 CAPSULE BY MOUTH ONCE DAILY IN THE MORNING BEFORE BREAKFAST       Last Visit Date (If Applicable):  1/4/2024    Next Visit Date:    2/8/2024

## 2024-02-13 NOTE — ASSESSMENT & PLAN NOTE
Too early to draw A1c will order this outpatient lab.  Office will call with results.  Continue current medication until advised by office.

## 2024-02-13 NOTE — ASSESSMENT & PLAN NOTE
At goal, continue current medications and continue current treatment plansmoking cessation is strongly encouraged and discussed. He is not ready to quit at this time. Discussion about prognosis and worsening condition due to smoking. He verbalized understanding. We will continue current monitoring and treatments.

## 2024-02-18 DIAGNOSIS — E66.01 MORBID OBESITY (HCC): ICD-10-CM

## 2024-02-18 DIAGNOSIS — M26.629 TEMPOROMANDIBULAR JOINT-PAIN-DYSFUNCTION SYNDROME (TMJ): ICD-10-CM

## 2024-02-18 DIAGNOSIS — J42 CHRONIC BRONCHITIS, UNSPECIFIED CHRONIC BRONCHITIS TYPE (HCC): ICD-10-CM

## 2024-02-18 DIAGNOSIS — M62.830 BACK MUSCLE SPASM: ICD-10-CM

## 2024-02-18 DIAGNOSIS — M19.90 ARTHRITIS: ICD-10-CM

## 2024-02-18 DIAGNOSIS — I89.0 LYMPHEDEMA OF BOTH LOWER EXTREMITIES: ICD-10-CM

## 2024-02-18 DIAGNOSIS — I10 ESSENTIAL HYPERTENSION: ICD-10-CM

## 2024-02-18 DIAGNOSIS — F17.200 SMOKING: ICD-10-CM

## 2024-02-18 DIAGNOSIS — R21 RASH OF BACK: ICD-10-CM

## 2024-02-18 DIAGNOSIS — M16.11 PRIMARY OSTEOARTHRITIS OF RIGHT HIP: ICD-10-CM

## 2024-02-18 DIAGNOSIS — I87.2 VENOUS INSUFFICIENCY (CHRONIC) (PERIPHERAL): ICD-10-CM

## 2024-02-18 DIAGNOSIS — E11.8 TYPE 2 DIABETES MELLITUS WITH COMPLICATION, WITHOUT LONG-TERM CURRENT USE OF INSULIN (HCC): ICD-10-CM

## 2024-02-18 DIAGNOSIS — E78.2 MIXED HYPERLIPIDEMIA: ICD-10-CM

## 2024-02-18 DIAGNOSIS — G47.33 OSA ON CPAP: ICD-10-CM

## 2024-02-19 NOTE — TELEPHONE ENCOUNTER
Chas Cooper is requesting a refill on the following medication(s):  Requested Prescriptions     Pending Prescriptions Disp Refills    metFORMIN (GLUCOPHAGE) 1000 MG tablet [Pharmacy Med Name: metFORMIN HCl 1000 MG Oral Tablet] 200 tablet 0     Sig: TAKE 1 TABLET BY MOUTH TWICE DAILY WITH MEALS       Last Visit Date (If Applicable):  2/8/2024    Next Visit Date:    8/8/2024

## 2024-02-20 DIAGNOSIS — R11.10 DRY HEAVES: ICD-10-CM

## 2024-02-20 DIAGNOSIS — J42 CHRONIC BRONCHITIS, UNSPECIFIED CHRONIC BRONCHITIS TYPE (HCC): ICD-10-CM

## 2024-02-20 DIAGNOSIS — L97.921 SKIN ULCER OF LEFT LOWER LEG, LIMITED TO BREAKDOWN OF SKIN (HCC): ICD-10-CM

## 2024-02-20 DIAGNOSIS — E66.01 MORBID OBESITY (HCC): ICD-10-CM

## 2024-02-20 DIAGNOSIS — F17.200 SMOKER: ICD-10-CM

## 2024-02-20 DIAGNOSIS — I87.2 VENOUS INSUFFICIENCY (CHRONIC) (PERIPHERAL): ICD-10-CM

## 2024-02-20 DIAGNOSIS — I89.0 LYMPHEDEMA OF BOTH LOWER EXTREMITIES: ICD-10-CM

## 2024-02-20 DIAGNOSIS — K21.9 GASTROESOPHAGEAL REFLUX DISEASE WITHOUT ESOPHAGITIS: ICD-10-CM

## 2024-02-20 DIAGNOSIS — Z87.11 HISTORY OF PEPTIC ULCER DISEASE: ICD-10-CM

## 2024-02-21 RX ORDER — FAMOTIDINE 20 MG/1
TABLET, FILM COATED ORAL
Qty: 180 TABLET | Refills: 0 | Status: SHIPPED | OUTPATIENT
Start: 2024-02-21

## 2024-02-21 NOTE — TELEPHONE ENCOUNTER
Chas Cooper is requesting a refill on the following medication(s):  Requested Prescriptions     Pending Prescriptions Disp Refills    famotidine (PEPCID) 20 MG tablet [Pharmacy Med Name: Famotidine 20 MG Oral Tablet] 180 tablet 0     Sig: Take 1 tablet by mouth twice daily       Last Visit Date (If Applicable):  2/8/2024    Next Visit Date:    8/8/2024

## 2024-03-03 DIAGNOSIS — I87.2 VENOUS INSUFFICIENCY (CHRONIC) (PERIPHERAL): ICD-10-CM

## 2024-03-03 DIAGNOSIS — E11.8 TYPE 2 DIABETES MELLITUS WITH COMPLICATION, WITHOUT LONG-TERM CURRENT USE OF INSULIN (HCC): ICD-10-CM

## 2024-03-03 DIAGNOSIS — R21 RASH OF BACK: ICD-10-CM

## 2024-03-03 DIAGNOSIS — I10 ESSENTIAL HYPERTENSION: ICD-10-CM

## 2024-03-03 DIAGNOSIS — F17.200 SMOKING: ICD-10-CM

## 2024-03-03 DIAGNOSIS — M26.629 TEMPOROMANDIBULAR JOINT-PAIN-DYSFUNCTION SYNDROME (TMJ): ICD-10-CM

## 2024-03-03 DIAGNOSIS — M62.830 BACK MUSCLE SPASM: ICD-10-CM

## 2024-03-03 DIAGNOSIS — M19.90 ARTHRITIS: ICD-10-CM

## 2024-03-03 DIAGNOSIS — G47.33 OSA ON CPAP: ICD-10-CM

## 2024-03-03 DIAGNOSIS — M16.11 PRIMARY OSTEOARTHRITIS OF RIGHT HIP: ICD-10-CM

## 2024-03-03 DIAGNOSIS — E66.01 MORBID OBESITY (HCC): ICD-10-CM

## 2024-03-03 DIAGNOSIS — E78.2 MIXED HYPERLIPIDEMIA: ICD-10-CM

## 2024-03-03 DIAGNOSIS — I89.0 LYMPHEDEMA OF BOTH LOWER EXTREMITIES: ICD-10-CM

## 2024-03-03 DIAGNOSIS — J42 CHRONIC BRONCHITIS, UNSPECIFIED CHRONIC BRONCHITIS TYPE (HCC): ICD-10-CM

## 2024-03-04 RX ORDER — CYCLOBENZAPRINE HCL 10 MG
TABLET ORAL
Qty: 90 TABLET | Refills: 0 | Status: SHIPPED | OUTPATIENT
Start: 2024-03-04

## 2024-03-04 NOTE — TELEPHONE ENCOUNTER
Chas Cooper is requesting a refill on the following medication(s):  Requested Prescriptions     Pending Prescriptions Disp Refills    cyclobenzaprine (FLEXERIL) 10 MG tablet [Pharmacy Med Name: Cyclobenzaprine HCl 10 MG Oral Tablet] 90 tablet 0     Sig: Take 1 tablet by mouth three times daily as needed for muscle spasm       Last Visit Date (If Applicable):  2/8/2024    Next Visit Date:    8/8/2024

## 2024-03-05 LAB — NONINV COLON CA DNA+OCC BLD SCRN STL QL: NEGATIVE

## 2024-03-06 ENCOUNTER — OFFICE VISIT (OUTPATIENT)
Dept: FAMILY MEDICINE CLINIC | Age: 59
End: 2024-03-06
Payer: MEDICARE

## 2024-03-06 DIAGNOSIS — R06.01 ORTHOPNEA: ICD-10-CM

## 2024-03-06 DIAGNOSIS — H61.23 BILATERAL IMPACTED CERUMEN: ICD-10-CM

## 2024-03-06 DIAGNOSIS — D72.829 LEUKOCYTOSIS, UNSPECIFIED TYPE: ICD-10-CM

## 2024-03-06 DIAGNOSIS — R06.02 SOB (SHORTNESS OF BREATH): ICD-10-CM

## 2024-03-06 DIAGNOSIS — R60.0 LOWER LEG EDEMA: ICD-10-CM

## 2024-03-06 DIAGNOSIS — H66.001 NON-RECURRENT ACUTE SUPPURATIVE OTITIS MEDIA OF RIGHT EAR WITHOUT SPONTANEOUS RUPTURE OF TYMPANIC MEMBRANE: Primary | ICD-10-CM

## 2024-03-06 DIAGNOSIS — E11.51 TYPE 2 DIABETES MELLITUS WITH DIABETIC PERIPHERAL ANGIOPATHY WITHOUT GANGRENE, WITH LONG-TERM CURRENT USE OF INSULIN (HCC): ICD-10-CM

## 2024-03-06 DIAGNOSIS — Z79.4 TYPE 2 DIABETES MELLITUS WITH DIABETIC PERIPHERAL ANGIOPATHY WITHOUT GANGRENE, WITH LONG-TERM CURRENT USE OF INSULIN (HCC): ICD-10-CM

## 2024-03-06 PROCEDURE — 69210 REMOVE IMPACTED EAR WAX UNI: CPT

## 2024-03-06 PROCEDURE — 3052F HG A1C>EQUAL 8.0%<EQUAL 9.0%: CPT

## 2024-03-06 PROCEDURE — 99214 OFFICE O/P EST MOD 30 MIN: CPT

## 2024-03-06 RX ORDER — AMOXICILLIN AND CLAVULANATE POTASSIUM 875; 125 MG/1; MG/1
1 TABLET, FILM COATED ORAL 2 TIMES DAILY
Qty: 14 TABLET | Refills: 0 | Status: SHIPPED | OUTPATIENT
Start: 2024-03-06 | End: 2024-03-13

## 2024-03-06 RX ORDER — FUROSEMIDE 40 MG/1
40 TABLET ORAL 2 TIMES DAILY
COMMUNITY
Start: 2024-02-08 | End: 2024-03-06

## 2024-03-06 RX ORDER — FUROSEMIDE 40 MG/1
40 TABLET ORAL 2 TIMES DAILY
Qty: 60 TABLET | Refills: 3 | Status: SHIPPED | OUTPATIENT
Start: 2024-03-06

## 2024-03-06 NOTE — PROGRESS NOTES
ill-appearing.   HENT:      Right Ear: Tympanic membrane and external ear normal. There is impacted cerumen.      Left Ear: Tympanic membrane and external ear normal. There is impacted cerumen.      Nose: Congestion present.      Mouth/Throat:      Mouth: Mucous membranes are moist.      Pharynx: No posterior oropharyngeal erythema.   Cardiovascular:      Rate and Rhythm: Normal rate and regular rhythm.      Pulses: Normal pulses.      Heart sounds: Normal heart sounds.   Pulmonary:      Effort: Pulmonary effort is normal.      Breath sounds: Normal breath sounds. No wheezing or rhonchi.   Abdominal:      General: Bowel sounds are normal.      Tenderness: There is no abdominal tenderness.   Musculoskeletal:      Right lower leg: Edema present.      Left lower leg: Edema present.   Neurological:      Mental Status: He is alert.           Please note that this chart was generated using voice recognition Dragon dictation software.  Although every effort was made to ensure the accuracy of this automated transcription, some errors in transcription may have occurred.    Electronically signed by IONA Martinez CNP on 3/12/2024 at 9:27 AM.

## 2024-03-06 NOTE — PATIENT INSTRUCTIONS
Please check your water pill and call me back. If you are on 40mg lasix twice a day then increase to 2 pills- 80mg in morning, 1 pill in the evening for 5 days. Get labs tomorrow

## 2024-03-07 LAB
ANION GAP SERPL CALCULATED.3IONS-SCNC: 3.7 MMOL/L (ref 3–11)
B-TYPE NATRIURETIC PEPTIDE: 7.2 PG/ML (ref 0–100)
BACTERIA, URINE: NORMAL /HPF
BASOPHILS %: 1.45 (ref 0–3)
BASOPHILS ABSOLUTE: 0.14 (ref 0–0.3)
BILIRUBIN URINE: NEGATIVE
BLOOD, URINE: NEGATIVE
BUN BLDV-MCNC: 19 MG/DL (ref 9–20)
CALCIUM SERPL-MCNC: 9.2 MG/DL (ref 8.4–10.2)
CASTS UA: NORMAL /LPF
CHLORIDE BLD-SCNC: 104 MMOL/L (ref 98–120)
CLARITY: CLEAR
CO2: 28 MMOL/L (ref 22–31)
COLOR, URINE: YELLOW
CREAT SERPL-MCNC: 0.8 MG/DL (ref 0.7–1.3)
CRYSTALS, UA: NORMAL
EOSINOPHILS %: 5.61 (ref 0–10)
EOSINOPHILS ABSOLUTE: 0.53 (ref 0–1.1)
FERRITIN: 91.5 NG/DL (ref 16–323)
GFR CALCULATED: > 60
GLUCOSE URINE: NORMAL MG/DL
GLUCOSE: 253 MG/DL (ref 75–110)
HBA1C MFR BLD: 8.6 % (ref 4.4–6.4)
HCT VFR BLD CALC: 41.3 % (ref 42–52)
HEMOGLOBIN: 12.7 (ref 13.8–17.8)
IRON % SATURATION: 24 % (ref 15–38)
IRON: 62 MG/DL (ref 49–181)
KETONES, URINE: NEGATIVE MG/DL
LEUKOCYTE ESTERASE, URINE: NEGATIVE
LYMPHOCYTE %: 24.66 (ref 20–51.1)
LYMPHOCYTES ABSOLUTE: 2.33 (ref 1–5.5)
MCH RBC QN AUTO: 27.4 PG (ref 28.5–32.5)
MCHC RBC AUTO-ENTMCNC: 30.9 G/DL (ref 32–37)
MCV RBC AUTO: 88.8 FL (ref 80–94)
MONOCYTES %: 8.32 (ref 1.7–9.3)
MONOCYTES ABSOLUTE: 0.79 (ref 0.1–1)
MUCUS, URINE: NORMAL
NEUTROPHILS %: 59.95 (ref 42.2–75.2)
NEUTROPHILS ABSOLUTE: 5.66 (ref 2–8.1)
NITRITE, URINE: NEGATIVE
PDW BLD-RTO: 14.9 % (ref 10–15.5)
PH UA: 6 (ref 5–8.5)
PLATELET # BLD: 240.7 THOU/MM3 (ref 130–400)
POTASSIUM SERPL-SCNC: 4.4 MMOL/L (ref 3.6–5)
PROTEIN UA: NORMAL MG/DL
RBC URINE: NORMAL /HPF (ref 0–2)
RBC: 4.65 M/UL (ref 4.7–6.1)
SODIUM BLD-SCNC: 135 MMOL/L (ref 135–145)
SPECIFIC GRAVITY, URINE: 1.02 MG/DL (ref 1–1.03)
SQUAMOUS EPITHELIAL: NORMAL /HPF
TOTAL IRON BINDING CAPACITY: 262 MG/DL (ref 261–497)
UROBILINOGEN, URINE: 0.2 MG/DL (ref 0.2–1)
WBC URINE: NORMAL /HPF (ref 0–4)
WBC: 9.4 THOU/ML3 (ref 4.8–10.8)

## 2024-03-12 ASSESSMENT — ENCOUNTER SYMPTOMS
CHEST TIGHTNESS: 1
SINUS PRESSURE: 1
SINUS PAIN: 1
RHINORRHEA: 1
COLOR CHANGE: 0
COUGH: 1
ABDOMINAL PAIN: 0
SHORTNESS OF BREATH: 0
WHEEZING: 0

## 2024-03-12 NOTE — ASSESSMENT & PLAN NOTE
Uncontrolled, changes made today: Will need to monitor weight more closely, start taking full tab of glipizide as they were only taking half.  Monitor blood sugar more closely as A1c shows elevation in this.  This likely is causing exacerbation of chronic conditions.

## 2024-03-21 ENCOUNTER — NURSE ONLY (OUTPATIENT)
Dept: FAMILY MEDICINE CLINIC | Age: 59
End: 2024-03-21
Payer: MEDICARE

## 2024-03-21 DIAGNOSIS — E11.51 TYPE 2 DIABETES MELLITUS WITH DIABETIC PERIPHERAL ANGIOPATHY WITHOUT GANGRENE, WITH LONG-TERM CURRENT USE OF INSULIN (HCC): Primary | ICD-10-CM

## 2024-03-21 DIAGNOSIS — Z79.4 TYPE 2 DIABETES MELLITUS WITH DIABETIC PERIPHERAL ANGIOPATHY WITHOUT GANGRENE, WITH LONG-TERM CURRENT USE OF INSULIN (HCC): Primary | ICD-10-CM

## 2024-03-21 LAB — HBA1C MFR BLD: 9.4 %

## 2024-03-21 PROCEDURE — PBSHW POCT GLYCOSYLATED HEMOGLOBIN (HGB A1C)

## 2024-03-21 PROCEDURE — 83036 HEMOGLOBIN GLYCOSYLATED A1C: CPT

## 2024-03-21 PROCEDURE — 99999 PR OFFICE/OUTPT VISIT,PROCEDURE ONLY: CPT

## 2024-03-21 RX ORDER — GLIPIZIDE 5 MG/1
5 TABLET, FILM COATED, EXTENDED RELEASE ORAL DAILY
Qty: 90 TABLET | Refills: 0 | Status: SHIPPED | OUTPATIENT
Start: 2024-03-21

## 2024-03-21 NOTE — PROGRESS NOTES
Lets increase glipizide to 5 mg tablets 1 tablet with breakfast.  Monitor for hypoglycemia, let office know if blood sugars are dipping down into the 80s and 70s.  Thank you

## 2024-04-05 NOTE — TELEPHONE ENCOUNTER
Chas Cooper is requesting a refill on the following medication(s):  Requested Prescriptions     Pending Prescriptions Disp Refills    potassium chloride (KLOR-CON) 10 MEQ extended release tablet [Pharmacy Med Name: Potassium Chloride ER 10 MEQ Oral Tablet Extended Release] 90 tablet 0     Sig: Take 1 tablet by mouth once daily       Last Visit Date (If Applicable):  3/6/2024    Next Visit Date:    8/8/2024

## 2024-04-09 RX ORDER — POTASSIUM CHLORIDE 750 MG/1
10 TABLET, FILM COATED, EXTENDED RELEASE ORAL DAILY
Qty: 90 TABLET | Refills: 0 | Status: SHIPPED | OUTPATIENT
Start: 2024-04-09

## 2024-04-22 DIAGNOSIS — I10 ESSENTIAL HYPERTENSION: ICD-10-CM

## 2024-04-22 DIAGNOSIS — M62.830 BACK MUSCLE SPASM: ICD-10-CM

## 2024-04-22 DIAGNOSIS — M16.11 PRIMARY OSTEOARTHRITIS OF RIGHT HIP: ICD-10-CM

## 2024-04-22 DIAGNOSIS — R21 RASH OF BACK: ICD-10-CM

## 2024-04-22 DIAGNOSIS — I89.0 LYMPHEDEMA OF BOTH LOWER EXTREMITIES: ICD-10-CM

## 2024-04-22 DIAGNOSIS — E66.01 MORBID OBESITY (HCC): ICD-10-CM

## 2024-04-22 DIAGNOSIS — E11.8 TYPE 2 DIABETES MELLITUS WITH COMPLICATION, WITHOUT LONG-TERM CURRENT USE OF INSULIN (HCC): ICD-10-CM

## 2024-04-22 DIAGNOSIS — F17.200 SMOKING: ICD-10-CM

## 2024-04-22 DIAGNOSIS — I87.2 VENOUS INSUFFICIENCY (CHRONIC) (PERIPHERAL): ICD-10-CM

## 2024-04-22 DIAGNOSIS — J42 CHRONIC BRONCHITIS, UNSPECIFIED CHRONIC BRONCHITIS TYPE (HCC): ICD-10-CM

## 2024-04-22 DIAGNOSIS — E78.2 MIXED HYPERLIPIDEMIA: ICD-10-CM

## 2024-04-22 DIAGNOSIS — M26.629 TEMPOROMANDIBULAR JOINT-PAIN-DYSFUNCTION SYNDROME (TMJ): ICD-10-CM

## 2024-04-22 DIAGNOSIS — G47.33 OSA ON CPAP: ICD-10-CM

## 2024-04-22 DIAGNOSIS — M19.90 ARTHRITIS: ICD-10-CM

## 2024-04-23 RX ORDER — CYCLOBENZAPRINE HCL 10 MG
TABLET ORAL
Qty: 90 TABLET | Refills: 0 | Status: SHIPPED | OUTPATIENT
Start: 2024-04-23

## 2024-04-23 NOTE — TELEPHONE ENCOUNTER
Chas Cooper is requesting a refill on the following medication(s):  Requested Prescriptions     Pending Prescriptions Disp Refills    cyclobenzaprine (FLEXERIL) 10 MG tablet [Pharmacy Med Name: Cyclobenzaprine HCl 10 MG Oral Tablet] 90 tablet 0     Sig: Take 1 tablet by mouth three times daily as needed for muscle spasm       Last Visit Date (If Applicable):  3/6/2024    Next Visit Date:    8/8/2024

## 2024-05-05 DIAGNOSIS — K21.9 GASTROESOPHAGEAL REFLUX DISEASE WITHOUT ESOPHAGITIS: ICD-10-CM

## 2024-05-05 DIAGNOSIS — I89.0 LYMPHEDEMA OF BOTH LOWER EXTREMITIES: ICD-10-CM

## 2024-05-05 DIAGNOSIS — Z87.11 HISTORY OF PEPTIC ULCER DISEASE: ICD-10-CM

## 2024-05-05 DIAGNOSIS — I87.2 VENOUS INSUFFICIENCY (CHRONIC) (PERIPHERAL): ICD-10-CM

## 2024-05-05 DIAGNOSIS — L97.921 SKIN ULCER OF LEFT LOWER LEG, LIMITED TO BREAKDOWN OF SKIN (HCC): ICD-10-CM

## 2024-05-05 DIAGNOSIS — J42 CHRONIC BRONCHITIS, UNSPECIFIED CHRONIC BRONCHITIS TYPE (HCC): ICD-10-CM

## 2024-05-05 DIAGNOSIS — R11.10 DRY HEAVES: ICD-10-CM

## 2024-05-05 DIAGNOSIS — F17.200 SMOKER: ICD-10-CM

## 2024-05-05 DIAGNOSIS — E66.01 MORBID OBESITY (HCC): ICD-10-CM

## 2024-05-06 RX ORDER — OMEPRAZOLE 40 MG/1
CAPSULE, DELAYED RELEASE ORAL
Qty: 90 CAPSULE | Refills: 0 | Status: SHIPPED | OUTPATIENT
Start: 2024-05-06

## 2024-05-06 NOTE — TELEPHONE ENCOUNTER
Chas Cooper is requesting a refill on the following medication(s):  Requested Prescriptions     Pending Prescriptions Disp Refills    omeprazole (PRILOSEC) 40 MG delayed release capsule [Pharmacy Med Name: Omeprazole 40 MG Oral Capsule Delayed Release] 90 capsule 0     Sig: TAKE 1 CAPSULE BY MOUTH ONCE DAILY IN THE MORNING BEFORE BREAKFAST       Last Visit Date (If Applicable):  3/6/2024    Next Visit Date:    8/8/2024

## 2024-05-20 ENCOUNTER — OFFICE VISIT (OUTPATIENT)
Dept: FAMILY MEDICINE CLINIC | Age: 59
End: 2024-05-20
Payer: MEDICARE

## 2024-05-20 VITALS — OXYGEN SATURATION: 92 % | DIASTOLIC BLOOD PRESSURE: 80 MMHG | SYSTOLIC BLOOD PRESSURE: 128 MMHG | HEART RATE: 98 BPM

## 2024-05-20 DIAGNOSIS — Z13.220 ENCOUNTER FOR LIPID SCREENING FOR CARDIOVASCULAR DISEASE: ICD-10-CM

## 2024-05-20 DIAGNOSIS — I10 ESSENTIAL HYPERTENSION: ICD-10-CM

## 2024-05-20 DIAGNOSIS — H66.014 RECURRENT ACUTE SUPPURATIVE OTITIS MEDIA OF RIGHT EAR WITH SPONTANEOUS RUPTURE OF TYMPANIC MEMBRANE: ICD-10-CM

## 2024-05-20 DIAGNOSIS — E11.51 TYPE 2 DIABETES MELLITUS WITH DIABETIC PERIPHERAL ANGIOPATHY WITHOUT GANGRENE, WITH LONG-TERM CURRENT USE OF INSULIN (HCC): Primary | ICD-10-CM

## 2024-05-20 DIAGNOSIS — Z13.6 ENCOUNTER FOR LIPID SCREENING FOR CARDIOVASCULAR DISEASE: ICD-10-CM

## 2024-05-20 DIAGNOSIS — Z79.4 TYPE 2 DIABETES MELLITUS WITH DIABETIC PERIPHERAL ANGIOPATHY WITHOUT GANGRENE, WITH LONG-TERM CURRENT USE OF INSULIN (HCC): Primary | ICD-10-CM

## 2024-05-20 PROCEDURE — 3046F HEMOGLOBIN A1C LEVEL >9.0%: CPT

## 2024-05-20 PROCEDURE — 3079F DIAST BP 80-89 MM HG: CPT

## 2024-05-20 PROCEDURE — 99212 OFFICE O/P EST SF 10 MIN: CPT

## 2024-05-20 PROCEDURE — 3074F SYST BP LT 130 MM HG: CPT

## 2024-05-20 PROCEDURE — 99214 OFFICE O/P EST MOD 30 MIN: CPT

## 2024-05-20 RX ORDER — NEOMYCIN SULFATE, POLYMYXIN B SULFATE AND HYDROCORTISONE 10; 3.5; 1 MG/ML; MG/ML; [USP'U]/ML
3 SUSPENSION/ DROPS AURICULAR (OTIC) 4 TIMES DAILY
Qty: 10 ML | Refills: 0 | Status: SHIPPED | OUTPATIENT
Start: 2024-05-20 | End: 2024-05-30

## 2024-05-20 RX ORDER — CEFDINIR 300 MG/1
300 CAPSULE ORAL 2 TIMES DAILY
Qty: 20 CAPSULE | Refills: 0 | Status: SHIPPED | OUTPATIENT
Start: 2024-05-20 | End: 2024-05-30

## 2024-05-20 RX ORDER — NATURAL MENTHOL 8.5 G/100ML
LOTION TOPICAL
Qty: 85 G | Refills: 1 | Status: SHIPPED | OUTPATIENT
Start: 2024-05-20

## 2024-05-20 SDOH — ECONOMIC STABILITY: FOOD INSECURITY: WITHIN THE PAST 12 MONTHS, THE FOOD YOU BOUGHT JUST DIDN'T LAST AND YOU DIDN'T HAVE MONEY TO GET MORE.: NEVER TRUE

## 2024-05-20 SDOH — ECONOMIC STABILITY: FOOD INSECURITY: WITHIN THE PAST 12 MONTHS, YOU WORRIED THAT YOUR FOOD WOULD RUN OUT BEFORE YOU GOT MONEY TO BUY MORE.: NEVER TRUE

## 2024-05-20 SDOH — ECONOMIC STABILITY: INCOME INSECURITY: HOW HARD IS IT FOR YOU TO PAY FOR THE VERY BASICS LIKE FOOD, HOUSING, MEDICAL CARE, AND HEATING?: NOT HARD AT ALL

## 2024-05-20 NOTE — PROGRESS NOTES
[facility]  [address] [address]  Dept: [phone]    Date of Service:  5/20/2024    Chas Cooper is a 58 y.o. male who presents in office today with Self and Spouse.    Chief Complaint   Patient presents with    Otalgia     Patient is here today for ongoing right ear pain that is now causing him blurry vision in his right eye as well.     Cellulitis     He is still having issues with cellulitis in his right leg that is burning all day long and discolored.         Diagnoses / Plan:   1. Type 2 diabetes mellitus with diabetic peripheral angiopathy without gangrene, with long-term current use of insulin ([facility])  Assessment & Plan:   Uncontrolled, continue current medications and lifestyle modifications recommended Continue to change diet- Denies any hypoglycemia with current meds, and states his blood sugars have improved since last visit.   Orders:  -      DIABETES FOOT EXAM  -     Basic Metabolic Panel, Fasting; Future  -     CBC with Auto Differential; Future  -     Podiatric Products (AMLACTIN FOOT REPAIR) 15 % CREA; Apply to left lower leg twice a day, Disp-85 g, R-1Normal  2. Recurrent acute suppurative otitis media of right ear with spontaneous rupture of tympanic membrane  -     cefdinir (OMNICEF) 300 MG capsule; Take 1 capsule by mouth 2 times daily for 10 days, Disp-20 capsule, R-0Normal  -     neomycin-polymyxin-hydrocortisone (CORTISPORIN) 3.5-82616-1 otic suspension; Place 3 drops into the right ear 4 times daily for 10 days, Disp-10 mL, R-0Normal  -TM very red and swollen, will start on oral and topical antibiotics, if no improvement will send to ENT.   3. Encounter for lipid screening for cardiovascular disease  -     Lipid Panel; Future  4. Essential hypertension  Assessment & Plan:   At goal, continue current medications and continue current treatment plan BP in goal range at this time. Will check labs.   Orders:  -     Basic Metabolic Panel, Fasting;

## 2024-05-23 DIAGNOSIS — Z87.11 HISTORY OF PEPTIC ULCER DISEASE: ICD-10-CM

## 2024-05-23 DIAGNOSIS — I89.0 LYMPHEDEMA OF BOTH LOWER EXTREMITIES: ICD-10-CM

## 2024-05-23 DIAGNOSIS — L97.921 SKIN ULCER OF LEFT LOWER LEG, LIMITED TO BREAKDOWN OF SKIN (HCC): ICD-10-CM

## 2024-05-23 DIAGNOSIS — K21.9 GASTROESOPHAGEAL REFLUX DISEASE WITHOUT ESOPHAGITIS: ICD-10-CM

## 2024-05-23 DIAGNOSIS — E66.01 MORBID OBESITY (HCC): ICD-10-CM

## 2024-05-23 DIAGNOSIS — F17.200 SMOKER: ICD-10-CM

## 2024-05-23 DIAGNOSIS — J42 CHRONIC BRONCHITIS, UNSPECIFIED CHRONIC BRONCHITIS TYPE (HCC): ICD-10-CM

## 2024-05-23 DIAGNOSIS — I87.2 VENOUS INSUFFICIENCY (CHRONIC) (PERIPHERAL): ICD-10-CM

## 2024-05-23 DIAGNOSIS — R11.10 DRY HEAVES: ICD-10-CM

## 2024-05-23 RX ORDER — FAMOTIDINE 20 MG/1
TABLET, FILM COATED ORAL
Qty: 180 TABLET | Refills: 0 | Status: SHIPPED | OUTPATIENT
Start: 2024-05-23

## 2024-05-23 ASSESSMENT — ENCOUNTER SYMPTOMS
EYE DISCHARGE: 0
ABDOMINAL PAIN: 0
CHEST TIGHTNESS: 0
WHEEZING: 0
COUGH: 0
BACK PAIN: 0
SINUS PRESSURE: 0
RHINORRHEA: 0
NAUSEA: 0
CONSTIPATION: 0
DIARRHEA: 0
EYE ITCHING: 0
COLOR CHANGE: 0
SHORTNESS OF BREATH: 0
SINUS PAIN: 0

## 2024-05-23 NOTE — ASSESSMENT & PLAN NOTE
Uncontrolled, continue current medications and lifestyle modifications recommended Continue to change diet- Denies any hypoglycemia with current meds, and states his blood sugars have improved since last visit.

## 2024-05-23 NOTE — ASSESSMENT & PLAN NOTE
At goal, continue current medications and continue current treatment plan BP in goal range at this time. Will check labs.

## 2024-05-27 DIAGNOSIS — J42 CHRONIC BRONCHITIS, UNSPECIFIED CHRONIC BRONCHITIS TYPE (HCC): ICD-10-CM

## 2024-05-27 DIAGNOSIS — I87.2 VENOUS INSUFFICIENCY (CHRONIC) (PERIPHERAL): ICD-10-CM

## 2024-05-27 DIAGNOSIS — M16.11 PRIMARY OSTEOARTHRITIS OF RIGHT HIP: ICD-10-CM

## 2024-05-27 DIAGNOSIS — I10 ESSENTIAL HYPERTENSION: ICD-10-CM

## 2024-05-27 DIAGNOSIS — R21 RASH OF BACK: ICD-10-CM

## 2024-05-27 DIAGNOSIS — M62.830 BACK MUSCLE SPASM: ICD-10-CM

## 2024-05-27 DIAGNOSIS — G47.33 OSA ON CPAP: ICD-10-CM

## 2024-05-27 DIAGNOSIS — E66.01 MORBID OBESITY (HCC): ICD-10-CM

## 2024-05-27 DIAGNOSIS — M19.90 ARTHRITIS: ICD-10-CM

## 2024-05-27 DIAGNOSIS — I89.0 LYMPHEDEMA OF BOTH LOWER EXTREMITIES: ICD-10-CM

## 2024-05-27 DIAGNOSIS — E11.8 TYPE 2 DIABETES MELLITUS WITH COMPLICATION, WITHOUT LONG-TERM CURRENT USE OF INSULIN (HCC): ICD-10-CM

## 2024-05-27 DIAGNOSIS — E78.2 MIXED HYPERLIPIDEMIA: ICD-10-CM

## 2024-05-27 DIAGNOSIS — F17.200 SMOKING: ICD-10-CM

## 2024-05-27 DIAGNOSIS — M26.629 TEMPOROMANDIBULAR JOINT-PAIN-DYSFUNCTION SYNDROME (TMJ): ICD-10-CM

## 2024-05-28 RX ORDER — LISINOPRIL 20 MG/1
TABLET ORAL
Qty: 100 TABLET | Refills: 0 | Status: SHIPPED | OUTPATIENT
Start: 2024-05-28

## 2024-05-28 RX ORDER — ATORVASTATIN CALCIUM 40 MG/1
TABLET, FILM COATED ORAL
Qty: 100 TABLET | Refills: 0 | Status: SHIPPED | OUTPATIENT
Start: 2024-05-28

## 2024-05-28 NOTE — TELEPHONE ENCOUNTER
Chas DONTE Burgostoby is requesting a refill on the following medication(s):  Requested Prescriptions     Pending Prescriptions Disp Refills    atorvastatin (LIPITOR) 40 MG tablet [Pharmacy Med Name: Atorvastatin Calcium 40 MG Oral Tablet] 100 tablet 0     Sig: Take 1 tablet by mouth once daily    lisinopril (PRINIVIL;ZESTRIL) 20 MG tablet [Pharmacy Med Name: Lisinopril 20 MG Oral Tablet] 100 tablet 0     Sig: Take 1 tablet by mouth once daily    metFORMIN (GLUCOPHAGE) 1000 MG tablet [Pharmacy Med Name: metFORMIN HCl 1000 MG Oral Tablet] 200 tablet 0     Sig: TAKE 1 TABLET BY MOUTH TWICE DAILY WITH MEALS       Last Visit Date (If Applicable):  5/20/2024    Next Visit Date:    8/8/2024

## 2024-06-03 DIAGNOSIS — E78.2 MIXED HYPERLIPIDEMIA: ICD-10-CM

## 2024-06-03 DIAGNOSIS — J42 CHRONIC BRONCHITIS, UNSPECIFIED CHRONIC BRONCHITIS TYPE (HCC): ICD-10-CM

## 2024-06-03 DIAGNOSIS — M16.11 PRIMARY OSTEOARTHRITIS OF RIGHT HIP: ICD-10-CM

## 2024-06-03 DIAGNOSIS — I10 ESSENTIAL HYPERTENSION: ICD-10-CM

## 2024-06-03 DIAGNOSIS — M26.629 TEMPOROMANDIBULAR JOINT-PAIN-DYSFUNCTION SYNDROME (TMJ): ICD-10-CM

## 2024-06-03 DIAGNOSIS — G47.33 OSA ON CPAP: ICD-10-CM

## 2024-06-03 DIAGNOSIS — I89.0 LYMPHEDEMA OF BOTH LOWER EXTREMITIES: ICD-10-CM

## 2024-06-03 DIAGNOSIS — M19.90 ARTHRITIS: ICD-10-CM

## 2024-06-03 DIAGNOSIS — F17.200 SMOKING: ICD-10-CM

## 2024-06-03 DIAGNOSIS — E66.01 MORBID OBESITY (HCC): ICD-10-CM

## 2024-06-03 DIAGNOSIS — I87.2 VENOUS INSUFFICIENCY (CHRONIC) (PERIPHERAL): ICD-10-CM

## 2024-06-03 DIAGNOSIS — E11.8 TYPE 2 DIABETES MELLITUS WITH COMPLICATION, WITHOUT LONG-TERM CURRENT USE OF INSULIN (HCC): ICD-10-CM

## 2024-06-03 DIAGNOSIS — R21 RASH OF BACK: ICD-10-CM

## 2024-06-03 DIAGNOSIS — M62.830 BACK MUSCLE SPASM: ICD-10-CM

## 2024-06-03 RX ORDER — CYCLOBENZAPRINE HCL 10 MG
TABLET ORAL
Qty: 90 TABLET | Refills: 0 | Status: SHIPPED | OUTPATIENT
Start: 2024-06-03

## 2024-06-03 NOTE — TELEPHONE ENCOUNTER
Chas Cooper is requesting a refill on the following medication(s):  Requested Prescriptions     Pending Prescriptions Disp Refills    cyclobenzaprine (FLEXERIL) 10 MG tablet [Pharmacy Med Name: Cyclobenzaprine HCl 10 MG Oral Tablet] 90 tablet 0     Sig: Take 1 tablet by mouth three times daily as needed for muscle spasm       Last Visit Date (If Applicable):  5/20/2024    Next Visit Date:    8/8/2024

## 2024-06-04 ENCOUNTER — TELEPHONE (OUTPATIENT)
Dept: FAMILY MEDICINE CLINIC | Age: 59
End: 2024-06-04

## 2024-06-04 DIAGNOSIS — H66.90: Primary | ICD-10-CM

## 2024-06-04 NOTE — TELEPHONE ENCOUNTER
Pts wife called stating he did finish his antibiotic but is still using the drops. He is still complaining that it feels half open. She would like to know if you can place a referral for ENT. [facility] be easier for them. Referral placed and faxed.

## 2024-06-17 DIAGNOSIS — E11.51 TYPE 2 DIABETES MELLITUS WITH DIABETIC PERIPHERAL ANGIOPATHY WITHOUT GANGRENE, WITH LONG-TERM CURRENT USE OF INSULIN (HCC): ICD-10-CM

## 2024-06-17 DIAGNOSIS — Z79.4 TYPE 2 DIABETES MELLITUS WITH DIABETIC PERIPHERAL ANGIOPATHY WITHOUT GANGRENE, WITH LONG-TERM CURRENT USE OF INSULIN (HCC): ICD-10-CM

## 2024-06-17 RX ORDER — GLIPIZIDE 5 MG/1
5 TABLET, FILM COATED, EXTENDED RELEASE ORAL DAILY
Qty: 90 TABLET | Refills: 0 | Status: SHIPPED | OUTPATIENT
Start: 2024-06-17

## 2024-06-17 NOTE — TELEPHONE ENCOUNTER
Chas Cooper is requesting a refill on the following medication(s):  Requested Prescriptions     Pending Prescriptions Disp Refills    glipiZIDE (GLUCOTROL XL) 5 MG extended release tablet [Pharmacy Med Name: glipiZIDE ER 5 MG Oral Tablet Extended Release 24 Hour] 90 tablet 0     Sig: Take 1 tablet by mouth once daily       Last Visit Date (If Applicable):  5/20/2024    Next Visit Date:    8/8/2024

## 2024-07-03 RX ORDER — POTASSIUM CHLORIDE 750 MG/1
10 TABLET, FILM COATED, EXTENDED RELEASE ORAL DAILY
Qty: 90 TABLET | Refills: 0 | Status: SHIPPED | OUTPATIENT
Start: 2024-07-03

## 2024-07-03 NOTE — TELEPHONE ENCOUNTER
Chas Cooper is requesting a refill on the following medication(s):  Requested Prescriptions     Pending Prescriptions Disp Refills    potassium chloride (KLOR-CON) 10 MEQ extended release tablet [Pharmacy Med Name: Potassium Chloride ER 10 MEQ Oral Tablet Extended Release] 90 tablet 0     Sig: Take 1 tablet by mouth once daily       Last Visit Date (If Applicable):  5/20/2024    Next Visit Date:    8/8/2024   Holding home Labetalol and Hydralazine, BP acceptable and likely lower than usual given decrease plasma volume.

## 2024-08-06 DIAGNOSIS — I87.2 VENOUS INSUFFICIENCY (CHRONIC) (PERIPHERAL): ICD-10-CM

## 2024-08-06 DIAGNOSIS — J42 CHRONIC BRONCHITIS, UNSPECIFIED CHRONIC BRONCHITIS TYPE (HCC): ICD-10-CM

## 2024-08-06 DIAGNOSIS — Z87.11 HISTORY OF PEPTIC ULCER DISEASE: ICD-10-CM

## 2024-08-06 DIAGNOSIS — L97.921 SKIN ULCER OF LEFT LOWER LEG, LIMITED TO BREAKDOWN OF SKIN (HCC): ICD-10-CM

## 2024-08-06 DIAGNOSIS — E66.01 MORBID OBESITY (HCC): ICD-10-CM

## 2024-08-06 DIAGNOSIS — R11.10 DRY HEAVES: ICD-10-CM

## 2024-08-06 DIAGNOSIS — K21.9 GASTROESOPHAGEAL REFLUX DISEASE WITHOUT ESOPHAGITIS: ICD-10-CM

## 2024-08-06 DIAGNOSIS — I89.0 LYMPHEDEMA OF BOTH LOWER EXTREMITIES: ICD-10-CM

## 2024-08-06 DIAGNOSIS — F17.200 SMOKER: ICD-10-CM

## 2024-08-06 RX ORDER — OMEPRAZOLE 40 MG/1
CAPSULE, DELAYED RELEASE ORAL
Qty: 90 CAPSULE | Refills: 0 | Status: SHIPPED | OUTPATIENT
Start: 2024-08-06

## 2024-08-06 NOTE — TELEPHONE ENCOUNTER
Chas Cooper is requesting a refill on the following medication(s):  Requested Prescriptions     Pending Prescriptions Disp Refills    omeprazole (PRILOSEC) 40 MG delayed release capsule [Pharmacy Med Name: Omeprazole 40 MG Oral Capsule Delayed Release] 90 capsule 0     Sig: TAKE 1 CAPSULE BY MOUTH ONCE DAILY IN THE MORNING BEFORE BREAKFAST       Last Visit Date (If Applicable):  5/20/2024    Next Visit Date:    8/8/2024

## 2024-08-08 DIAGNOSIS — I89.0 LYMPHEDEMA OF BOTH LOWER EXTREMITIES: ICD-10-CM

## 2024-08-08 DIAGNOSIS — I10 ESSENTIAL HYPERTENSION: ICD-10-CM

## 2024-08-08 DIAGNOSIS — E66.01 MORBID OBESITY (HCC): ICD-10-CM

## 2024-08-08 DIAGNOSIS — R21 RASH OF BACK: ICD-10-CM

## 2024-08-08 DIAGNOSIS — E78.2 MIXED HYPERLIPIDEMIA: ICD-10-CM

## 2024-08-08 DIAGNOSIS — I87.2 VENOUS INSUFFICIENCY (CHRONIC) (PERIPHERAL): ICD-10-CM

## 2024-08-08 DIAGNOSIS — M26.629 TEMPOROMANDIBULAR JOINT-PAIN-DYSFUNCTION SYNDROME (TMJ): ICD-10-CM

## 2024-08-08 DIAGNOSIS — M62.830 BACK MUSCLE SPASM: ICD-10-CM

## 2024-08-08 DIAGNOSIS — J42 CHRONIC BRONCHITIS, UNSPECIFIED CHRONIC BRONCHITIS TYPE (HCC): ICD-10-CM

## 2024-08-08 DIAGNOSIS — G47.33 OSA ON CPAP: ICD-10-CM

## 2024-08-08 DIAGNOSIS — E11.8 TYPE 2 DIABETES MELLITUS WITH COMPLICATION, WITHOUT LONG-TERM CURRENT USE OF INSULIN (HCC): ICD-10-CM

## 2024-08-08 DIAGNOSIS — F17.200 SMOKING: ICD-10-CM

## 2024-08-08 DIAGNOSIS — M19.90 ARTHRITIS: ICD-10-CM

## 2024-08-08 DIAGNOSIS — M16.11 PRIMARY OSTEOARTHRITIS OF RIGHT HIP: ICD-10-CM

## 2024-08-08 RX ORDER — CYCLOBENZAPRINE HCL 10 MG
TABLET ORAL
Qty: 90 TABLET | Refills: 0 | Status: SHIPPED | OUTPATIENT
Start: 2024-08-08

## 2024-08-08 NOTE — TELEPHONE ENCOUNTER
Chas Cooper is requesting a refill on the following medication(s):  Requested Prescriptions     Pending Prescriptions Disp Refills    cyclobenzaprine (FLEXERIL) 10 MG tablet [Pharmacy Med Name: Cyclobenzaprine HCl 10 MG Oral Tablet] 90 tablet 0     Sig: Take 1 tablet by mouth three times daily as needed for muscle spasm       Last Visit Date (If Applicable):  5/20/2024    Next Visit Date:    Visit date not found

## 2024-08-20 DIAGNOSIS — I89.0 LYMPHEDEMA OF BOTH LOWER EXTREMITIES: ICD-10-CM

## 2024-08-20 DIAGNOSIS — J42 CHRONIC BRONCHITIS, UNSPECIFIED CHRONIC BRONCHITIS TYPE (HCC): ICD-10-CM

## 2024-08-20 DIAGNOSIS — F17.200 SMOKER: ICD-10-CM

## 2024-08-20 DIAGNOSIS — L97.921 SKIN ULCER OF LEFT LOWER LEG, LIMITED TO BREAKDOWN OF SKIN (HCC): ICD-10-CM

## 2024-08-20 DIAGNOSIS — K21.9 GASTROESOPHAGEAL REFLUX DISEASE WITHOUT ESOPHAGITIS: ICD-10-CM

## 2024-08-20 DIAGNOSIS — R11.10 DRY HEAVES: ICD-10-CM

## 2024-08-20 DIAGNOSIS — I87.2 VENOUS INSUFFICIENCY (CHRONIC) (PERIPHERAL): ICD-10-CM

## 2024-08-20 DIAGNOSIS — E66.01 MORBID OBESITY (HCC): ICD-10-CM

## 2024-08-20 DIAGNOSIS — Z87.11 HISTORY OF PEPTIC ULCER DISEASE: ICD-10-CM

## 2024-08-20 RX ORDER — FAMOTIDINE 20 MG/1
TABLET, FILM COATED ORAL
Qty: 180 TABLET | Refills: 0 | Status: SHIPPED | OUTPATIENT
Start: 2024-08-20

## 2024-08-20 NOTE — TELEPHONE ENCOUNTER
Chas Cooper is requesting a refill on the following medication(s):  Requested Prescriptions     Pending Prescriptions Disp Refills    famotidine (PEPCID) 20 MG tablet [Pharmacy Med Name: Famotidine 20 MG Oral Tablet] 180 tablet 0     Sig: Take 1 tablet by mouth twice daily       Last Visit Date (If Applicable):  5/20/2024    Next Visit Date:    2/10/2025

## 2024-09-02 DIAGNOSIS — M26.629 TEMPOROMANDIBULAR JOINT-PAIN-DYSFUNCTION SYNDROME (TMJ): ICD-10-CM

## 2024-09-02 DIAGNOSIS — E66.01 MORBID OBESITY (HCC): ICD-10-CM

## 2024-09-02 DIAGNOSIS — M19.90 ARTHRITIS: ICD-10-CM

## 2024-09-02 DIAGNOSIS — M62.830 BACK MUSCLE SPASM: ICD-10-CM

## 2024-09-02 DIAGNOSIS — J42 CHRONIC BRONCHITIS, UNSPECIFIED CHRONIC BRONCHITIS TYPE (HCC): ICD-10-CM

## 2024-09-02 DIAGNOSIS — I87.2 VENOUS INSUFFICIENCY (CHRONIC) (PERIPHERAL): ICD-10-CM

## 2024-09-02 DIAGNOSIS — I10 ESSENTIAL HYPERTENSION: ICD-10-CM

## 2024-09-02 DIAGNOSIS — R21 RASH OF BACK: ICD-10-CM

## 2024-09-02 DIAGNOSIS — M16.11 PRIMARY OSTEOARTHRITIS OF RIGHT HIP: ICD-10-CM

## 2024-09-02 DIAGNOSIS — F17.200 SMOKING: ICD-10-CM

## 2024-09-02 DIAGNOSIS — E11.8 TYPE 2 DIABETES MELLITUS WITH COMPLICATION, WITHOUT LONG-TERM CURRENT USE OF INSULIN (HCC): ICD-10-CM

## 2024-09-02 DIAGNOSIS — G47.33 OSA ON CPAP: ICD-10-CM

## 2024-09-02 DIAGNOSIS — I89.0 LYMPHEDEMA OF BOTH LOWER EXTREMITIES: ICD-10-CM

## 2024-09-02 DIAGNOSIS — E78.2 MIXED HYPERLIPIDEMIA: ICD-10-CM

## 2024-09-03 RX ORDER — LISINOPRIL 20 MG/1
TABLET ORAL
Qty: 100 TABLET | Refills: 0 | Status: SHIPPED | OUTPATIENT
Start: 2024-09-03

## 2024-09-03 RX ORDER — ATORVASTATIN CALCIUM 40 MG/1
TABLET, FILM COATED ORAL
Qty: 100 TABLET | Refills: 0 | Status: SHIPPED | OUTPATIENT
Start: 2024-09-03

## 2024-09-03 NOTE — TELEPHONE ENCOUNTER
Chas DONTE Burgostoby is requesting a refill on the following medication(s):  Requested Prescriptions     Pending Prescriptions Disp Refills    atorvastatin (LIPITOR) 40 MG tablet [Pharmacy Med Name: Atorvastatin Calcium 40 MG Oral Tablet] 100 tablet 0     Sig: Take 1 tablet by mouth once daily    lisinopril (PRINIVIL;ZESTRIL) 20 MG tablet [Pharmacy Med Name: Lisinopril 20 MG Oral Tablet] 100 tablet 0     Sig: Take 1 tablet by mouth once daily    metFORMIN (GLUCOPHAGE) 1000 MG tablet [Pharmacy Med Name: metFORMIN HCl 1000 MG Oral Tablet] 200 tablet 0     Sig: TAKE 1 TABLET BY MOUTH TWICE DAILY WITH MEALS       Last Visit Date (If Applicable):  5/20/2024    Next Visit Date:    2/10/2025

## 2024-09-18 DIAGNOSIS — E11.51 TYPE 2 DIABETES MELLITUS WITH DIABETIC PERIPHERAL ANGIOPATHY WITHOUT GANGRENE, WITH LONG-TERM CURRENT USE OF INSULIN (HCC): ICD-10-CM

## 2024-09-18 DIAGNOSIS — Z79.4 TYPE 2 DIABETES MELLITUS WITH DIABETIC PERIPHERAL ANGIOPATHY WITHOUT GANGRENE, WITH LONG-TERM CURRENT USE OF INSULIN (HCC): ICD-10-CM

## 2024-09-18 RX ORDER — GLIPIZIDE 5 MG/1
5 TABLET, FILM COATED, EXTENDED RELEASE ORAL DAILY
Qty: 90 TABLET | Refills: 0 | Status: SHIPPED | OUTPATIENT
Start: 2024-09-18

## 2024-09-25 DIAGNOSIS — G47.33 OSA ON CPAP: ICD-10-CM

## 2024-09-25 DIAGNOSIS — I89.0 LYMPHEDEMA OF BOTH LOWER EXTREMITIES: ICD-10-CM

## 2024-09-25 DIAGNOSIS — F17.200 SMOKING: ICD-10-CM

## 2024-09-25 DIAGNOSIS — I87.2 VENOUS INSUFFICIENCY (CHRONIC) (PERIPHERAL): ICD-10-CM

## 2024-09-25 DIAGNOSIS — E78.2 MIXED HYPERLIPIDEMIA: ICD-10-CM

## 2024-09-25 DIAGNOSIS — E66.01 MORBID OBESITY: ICD-10-CM

## 2024-09-25 DIAGNOSIS — J42 CHRONIC BRONCHITIS, UNSPECIFIED CHRONIC BRONCHITIS TYPE (HCC): ICD-10-CM

## 2024-09-25 DIAGNOSIS — M26.629 TEMPOROMANDIBULAR JOINT-PAIN-DYSFUNCTION SYNDROME (TMJ): ICD-10-CM

## 2024-09-25 DIAGNOSIS — M19.90 ARTHRITIS: ICD-10-CM

## 2024-09-25 DIAGNOSIS — I10 ESSENTIAL HYPERTENSION: ICD-10-CM

## 2024-09-25 DIAGNOSIS — E11.8 TYPE 2 DIABETES MELLITUS WITH COMPLICATION, WITHOUT LONG-TERM CURRENT USE OF INSULIN (HCC): ICD-10-CM

## 2024-09-25 DIAGNOSIS — M16.11 PRIMARY OSTEOARTHRITIS OF RIGHT HIP: ICD-10-CM

## 2024-09-25 DIAGNOSIS — M62.830 BACK MUSCLE SPASM: ICD-10-CM

## 2024-09-25 DIAGNOSIS — R21 RASH OF BACK: ICD-10-CM

## 2024-09-26 RX ORDER — CYCLOBENZAPRINE HCL 10 MG
TABLET ORAL
Qty: 90 TABLET | Refills: 0 | Status: SHIPPED | OUTPATIENT
Start: 2024-09-26

## 2024-10-23 ENCOUNTER — OFFICE VISIT (OUTPATIENT)
Dept: FAMILY MEDICINE CLINIC | Age: 59
End: 2024-10-23
Payer: MEDICARE

## 2024-10-23 VITALS
HEART RATE: 84 BPM | BODY MASS INDEX: 42.66 KG/M2 | OXYGEN SATURATION: 95 % | DIASTOLIC BLOOD PRESSURE: 80 MMHG | SYSTOLIC BLOOD PRESSURE: 124 MMHG | HEIGHT: 72 IN | WEIGHT: 315 LBS

## 2024-10-23 DIAGNOSIS — Z79.4 TYPE 2 DIABETES MELLITUS WITH DIABETIC PERIPHERAL ANGIOPATHY WITHOUT GANGRENE, WITH LONG-TERM CURRENT USE OF INSULIN (HCC): Primary | ICD-10-CM

## 2024-10-23 DIAGNOSIS — E11.51 TYPE 2 DIABETES MELLITUS WITH DIABETIC PERIPHERAL ANGIOPATHY WITHOUT GANGRENE, WITH LONG-TERM CURRENT USE OF INSULIN (HCC): Primary | ICD-10-CM

## 2024-10-23 LAB — HBA1C MFR BLD: 9.9 %

## 2024-10-23 PROCEDURE — 83036 HEMOGLOBIN GLYCOSYLATED A1C: CPT

## 2024-10-23 PROCEDURE — 99211 OFF/OP EST MAY X REQ PHY/QHP: CPT

## 2024-10-23 RX ORDER — GLIPIZIDE 10 MG/1
10 TABLET, FILM COATED, EXTENDED RELEASE ORAL DAILY
Qty: 90 TABLET | Refills: 0 | Status: SHIPPED | OUTPATIENT
Start: 2024-10-23

## 2024-10-23 NOTE — ASSESSMENT & PLAN NOTE
-Uncontrolled: Will change medications increase glipizide, warning with this medicine causing hypoglycemia is discussed.  Needs to monitor blood sugar more closely, exercise as tolerated, follow-up with Gayla Chi diabetic management nurse practitioner.    Orders:    POCT Hb A1C (glycosylated hemoglobin)    glipiZIDE (GLUCOTROL XL) 10 MG extended release tablet; Take 1 tablet by mouth daily    Gayla Watkins, AARON, Diabetes Management, [place]

## 2024-10-23 NOTE — PROGRESS NOTES
Chas Cooper (:  1965) is a 59 y.o. male,Established patient, here for evaluation of the following chief complaint(s):  Diabetes (Pt is here for a follow up. )         Assessment & Plan  Type 2 diabetes mellitus with diabetic peripheral angiopathy without gangrene, with long-term current use of insulin ([facility])   -Uncontrolled: Will change medications increase glipizide, warning with this medicine causing hypoglycemia is discussed.  Needs to monitor blood sugar more closely, exercise as tolerated, follow-up with Gayla Chi diabetic management nurse practitioner.    Orders:    POCT Hb A1C (glycosylated hemoglobin)    glipiZIDE (GLUCOTROL XL) 10 MG extended release tablet; Take 1 tablet by mouth daily    Gayla Watkins, NP, Diabetes Management, [place]      No follow-ups on file.       Subjective   Diabetes  He presents for his follow-up diabetic visit. He has type 2 diabetes mellitus. His disease course has been worsening. Diabetic complications include a CVA and peripheral neuropathy. He is compliant with treatment some of the time. His home blood glucose trend is fluctuating dramatically.       Review of Systems   Constitutional:  Negative for chills and fever.   HENT:  Negative for congestion.    Respiratory:  Negative for cough and shortness of breath.    Gastrointestinal:  Negative for abdominal pain.   Musculoskeletal:  Positive for arthralgias.   Skin:  Negative for color change.          Objective   Physical Exam  Vitals and nursing note reviewed.   HENT:      Head: Normocephalic.      Nose: No congestion.   Cardiovascular:      Rate and Rhythm: Normal rate and regular rhythm.      Pulses: Normal pulses.      Heart sounds: Normal heart sounds. No murmur heard.  Pulmonary:      Effort: Pulmonary effort is normal.      Breath sounds: Normal breath sounds.   Abdominal:      General: Bowel sounds are normal.   Musculoskeletal:      Right lower leg: Edema present.      Left lower leg: Edema

## 2024-10-29 ASSESSMENT — ENCOUNTER SYMPTOMS
SHORTNESS OF BREATH: 0
COLOR CHANGE: 0
ABDOMINAL PAIN: 0
COUGH: 0

## 2024-11-05 DIAGNOSIS — K21.9 GASTROESOPHAGEAL REFLUX DISEASE WITHOUT ESOPHAGITIS: ICD-10-CM

## 2024-11-05 DIAGNOSIS — J42 CHRONIC BRONCHITIS, UNSPECIFIED CHRONIC BRONCHITIS TYPE (HCC): ICD-10-CM

## 2024-11-05 DIAGNOSIS — L97.921 SKIN ULCER OF LEFT LOWER LEG, LIMITED TO BREAKDOWN OF SKIN (HCC): ICD-10-CM

## 2024-11-05 DIAGNOSIS — E66.01 MORBID OBESITY: ICD-10-CM

## 2024-11-05 DIAGNOSIS — I87.2 VENOUS INSUFFICIENCY (CHRONIC) (PERIPHERAL): ICD-10-CM

## 2024-11-05 DIAGNOSIS — R11.10 DRY HEAVES: ICD-10-CM

## 2024-11-05 DIAGNOSIS — I89.0 LYMPHEDEMA OF BOTH LOWER EXTREMITIES: ICD-10-CM

## 2024-11-05 DIAGNOSIS — Z87.11 HISTORY OF PEPTIC ULCER DISEASE: ICD-10-CM

## 2024-11-05 DIAGNOSIS — F17.200 SMOKER: ICD-10-CM

## 2024-11-05 RX ORDER — POTASSIUM CHLORIDE 750 MG/1
10 TABLET, EXTENDED RELEASE ORAL DAILY
Qty: 90 TABLET | Refills: 0 | Status: SHIPPED | OUTPATIENT
Start: 2024-11-05

## 2024-11-05 RX ORDER — OMEPRAZOLE 40 MG/1
CAPSULE, DELAYED RELEASE ORAL
Qty: 90 CAPSULE | Refills: 0 | Status: SHIPPED | OUTPATIENT
Start: 2024-11-05

## 2024-11-05 NOTE — TELEPHONE ENCOUNTER
Chas Cooper is requesting a refill on the following medication(s):  Requested Prescriptions     Pending Prescriptions Disp Refills    potassium chloride (KLOR-CON) 10 MEQ extended release tablet [Pharmacy Med Name: Potassium Chloride ER 10 MEQ Oral Tablet Extended Release] 90 tablet 0     Sig: Take 1 tablet by mouth once daily    omeprazole (PRILOSEC) 40 MG delayed release capsule [Pharmacy Med Name: Omeprazole 40 MG Oral Capsule Delayed Release] 90 capsule 0     Sig: TAKE 1 CAPSULE BY MOUTH ONCE DAILY IN THE MORNING BEFORE BREAKFAST       Last Visit Date (If Applicable):  10/23/2024    Next Visit Date:    2/10/2025

## 2024-11-20 DIAGNOSIS — I10 ESSENTIAL HYPERTENSION: ICD-10-CM

## 2024-11-20 DIAGNOSIS — E11.8 TYPE 2 DIABETES MELLITUS WITH COMPLICATION, WITHOUT LONG-TERM CURRENT USE OF INSULIN (HCC): ICD-10-CM

## 2024-11-20 DIAGNOSIS — M16.11 PRIMARY OSTEOARTHRITIS OF RIGHT HIP: ICD-10-CM

## 2024-11-20 DIAGNOSIS — M19.90 ARTHRITIS: ICD-10-CM

## 2024-11-20 DIAGNOSIS — M26.629 TEMPOROMANDIBULAR JOINT-PAIN-DYSFUNCTION SYNDROME (TMJ): ICD-10-CM

## 2024-11-20 DIAGNOSIS — E66.01 MORBID OBESITY: ICD-10-CM

## 2024-11-20 DIAGNOSIS — I87.2 VENOUS INSUFFICIENCY (CHRONIC) (PERIPHERAL): ICD-10-CM

## 2024-11-20 DIAGNOSIS — F17.200 SMOKING: ICD-10-CM

## 2024-11-20 DIAGNOSIS — E78.2 MIXED HYPERLIPIDEMIA: ICD-10-CM

## 2024-11-20 DIAGNOSIS — R21 RASH OF BACK: ICD-10-CM

## 2024-11-20 DIAGNOSIS — M62.830 BACK MUSCLE SPASM: ICD-10-CM

## 2024-11-20 DIAGNOSIS — I89.0 LYMPHEDEMA OF BOTH LOWER EXTREMITIES: ICD-10-CM

## 2024-11-20 DIAGNOSIS — J42 CHRONIC BRONCHITIS, UNSPECIFIED CHRONIC BRONCHITIS TYPE (HCC): ICD-10-CM

## 2024-11-20 DIAGNOSIS — G47.33 OSA ON CPAP: ICD-10-CM

## 2024-11-20 RX ORDER — CYCLOBENZAPRINE HCL 10 MG
TABLET ORAL
Qty: 90 TABLET | Refills: 0 | Status: SHIPPED | OUTPATIENT
Start: 2024-11-20

## 2024-11-20 NOTE — TELEPHONE ENCOUNTER
Chas Cooper is requesting a refill on the following medication(s):  Requested Prescriptions     Pending Prescriptions Disp Refills    cyclobenzaprine (FLEXERIL) 10 MG tablet [Pharmacy Med Name: Cyclobenzaprine HCl 10 MG Oral Tablet] 90 tablet 0     Sig: Take 1 tablet by mouth three times daily as needed for muscle spasm       Last Visit Date (If Applicable):  10/23/2024    Next Visit Date:    2/10/2025

## 2024-12-01 DIAGNOSIS — E13.69 OTHER SPECIFIED DIABETES MELLITUS WITH OTHER SPECIFIED COMPLICATION, UNSPECIFIED WHETHER LONG TERM INSULIN USE (HCC): ICD-10-CM

## 2024-12-01 DIAGNOSIS — Z79.4 TYPE 2 DIABETES MELLITUS WITH DIABETIC POLYNEUROPATHY, WITH LONG-TERM CURRENT USE OF INSULIN (HCC): ICD-10-CM

## 2024-12-01 DIAGNOSIS — E11.42 TYPE 2 DIABETES MELLITUS WITH DIABETIC POLYNEUROPATHY, WITH LONG-TERM CURRENT USE OF INSULIN (HCC): ICD-10-CM

## 2024-12-02 RX ORDER — INSULIN GLARGINE 100 [IU]/ML
INJECTION, SOLUTION SUBCUTANEOUS
Qty: 15 ML | Refills: 0 | Status: SHIPPED | OUTPATIENT
Start: 2024-12-02

## 2024-12-02 NOTE — TELEPHONE ENCOUNTER
Chas Cooper is requesting a refill on the following medication(s):  Requested Prescriptions     Pending Prescriptions Disp Refills    BASAGLAR KWIKPEN 100 UNIT/ML injection pen [Pharmacy Med Name: Basaglar KwikPen 100 UNIT/ML Subcutaneous Solution Pen-injector] 15 mL 0     Sig: INJECT 65 UNITS SUBCUTANEOUSLY IN THE MORNING AND 25 UNITS AT BEDTIME       Last Visit Date (If Applicable):  10/23/2024    Next Visit Date:    2/10/2025

## 2024-12-04 NOTE — TELEPHONE ENCOUNTER
Chas Cooper is requesting a refill on the following medication(s):  Requested Prescriptions     Pending Prescriptions Disp Refills    NOVOLOG FLEXPEN 100 UNIT/ML injection pen       Sig: INJECT UNITS SUBCUTANEOUSLY BEFORE MEAL(S) AND AT BEDTIME, IF SUGAR LESS THAN 175 INJECT O UNITS, 175-250 2 UNITS, 251-300 4 UNITS, 301-350 6 UNITS, -400 8 UNITS.       Last Visit Date (If Applicable):  10/23/2024    Next Visit Date:    2/10/2025

## 2024-12-05 RX ORDER — INSULIN ASPART 100 [IU]/ML
INJECTION, SOLUTION INTRAVENOUS; SUBCUTANEOUS
Qty: 5 ADJUSTABLE DOSE PRE-FILLED PEN SYRINGE | Refills: 2 | Status: SHIPPED | OUTPATIENT
Start: 2024-12-05

## 2024-12-10 DIAGNOSIS — M16.11 PRIMARY OSTEOARTHRITIS OF RIGHT HIP: ICD-10-CM

## 2024-12-10 DIAGNOSIS — M19.90 ARTHRITIS: ICD-10-CM

## 2024-12-10 DIAGNOSIS — E66.01 MORBID OBESITY: ICD-10-CM

## 2024-12-10 DIAGNOSIS — I10 ESSENTIAL HYPERTENSION: ICD-10-CM

## 2024-12-10 DIAGNOSIS — F17.200 SMOKING: ICD-10-CM

## 2024-12-10 DIAGNOSIS — E78.2 MIXED HYPERLIPIDEMIA: ICD-10-CM

## 2024-12-10 DIAGNOSIS — G47.33 OSA ON CPAP: ICD-10-CM

## 2024-12-10 DIAGNOSIS — M62.830 BACK MUSCLE SPASM: ICD-10-CM

## 2024-12-10 DIAGNOSIS — I87.2 VENOUS INSUFFICIENCY (CHRONIC) (PERIPHERAL): ICD-10-CM

## 2024-12-10 DIAGNOSIS — J42 CHRONIC BRONCHITIS, UNSPECIFIED CHRONIC BRONCHITIS TYPE (HCC): ICD-10-CM

## 2024-12-10 DIAGNOSIS — R21 RASH OF BACK: ICD-10-CM

## 2024-12-10 DIAGNOSIS — M26.629 TEMPOROMANDIBULAR JOINT-PAIN-DYSFUNCTION SYNDROME (TMJ): ICD-10-CM

## 2024-12-10 DIAGNOSIS — E11.8 TYPE 2 DIABETES MELLITUS WITH COMPLICATION, WITHOUT LONG-TERM CURRENT USE OF INSULIN (HCC): ICD-10-CM

## 2024-12-10 DIAGNOSIS — I89.0 LYMPHEDEMA OF BOTH LOWER EXTREMITIES: ICD-10-CM

## 2024-12-10 RX ORDER — ATORVASTATIN CALCIUM 40 MG/1
TABLET, FILM COATED ORAL
Qty: 100 TABLET | Refills: 0 | Status: SHIPPED | OUTPATIENT
Start: 2024-12-10

## 2024-12-10 RX ORDER — LISINOPRIL 20 MG/1
TABLET ORAL
Qty: 100 TABLET | Refills: 0 | Status: SHIPPED | OUTPATIENT
Start: 2024-12-10

## 2024-12-10 NOTE — TELEPHONE ENCOUNTER
Chas DONTE Burgostoby is requesting a refill on the following medication(s):  Requested Prescriptions     Pending Prescriptions Disp Refills    atorvastatin (LIPITOR) 40 MG tablet [Pharmacy Med Name: Atorvastatin Calcium 40 MG Oral Tablet] 100 tablet 0     Sig: Take 1 tablet by mouth once daily    lisinopril (PRINIVIL;ZESTRIL) 20 MG tablet [Pharmacy Med Name: Lisinopril 20 MG Oral Tablet] 100 tablet 0     Sig: Take 1 tablet by mouth once daily    metFORMIN (GLUCOPHAGE) 1000 MG tablet [Pharmacy Med Name: metFORMIN HCl 1000 MG Oral Tablet] 200 tablet 0     Sig: TAKE 1 TABLET BY MOUTH TWICE DAILY WITH MEALS       Last Visit Date (If Applicable):  10/23/2024    Next Visit Date:    2/10/2025

## 2024-12-17 ENCOUNTER — OFFICE VISIT (OUTPATIENT)
Dept: DIABETES SERVICES | Age: 59
End: 2024-12-17
Payer: MEDICARE

## 2024-12-17 VITALS
HEART RATE: 71 BPM | BODY MASS INDEX: 42.66 KG/M2 | SYSTOLIC BLOOD PRESSURE: 128 MMHG | DIASTOLIC BLOOD PRESSURE: 70 MMHG | HEIGHT: 72 IN | WEIGHT: 315 LBS | OXYGEN SATURATION: 98 %

## 2024-12-17 DIAGNOSIS — Z79.4 TYPE 2 DIABETES MELLITUS WITH HYPERGLYCEMIA, WITH LONG-TERM CURRENT USE OF INSULIN (HCC): Primary | ICD-10-CM

## 2024-12-17 DIAGNOSIS — E78.00 HYPERCHOLESTEROLEMIA: ICD-10-CM

## 2024-12-17 DIAGNOSIS — E11.65 TYPE 2 DIABETES MELLITUS WITH HYPERGLYCEMIA, WITH LONG-TERM CURRENT USE OF INSULIN (HCC): Primary | ICD-10-CM

## 2024-12-17 PROCEDURE — 3046F HEMOGLOBIN A1C LEVEL >9.0%: CPT | Performed by: NURSE PRACTITIONER

## 2024-12-17 PROCEDURE — 95251 CONT GLUC MNTR ANALYSIS I&R: CPT | Performed by: NURSE PRACTITIONER

## 2024-12-17 PROCEDURE — G2211 COMPLEX E/M VISIT ADD ON: HCPCS | Performed by: NURSE PRACTITIONER

## 2024-12-17 PROCEDURE — 99214 OFFICE O/P EST MOD 30 MIN: CPT | Performed by: NURSE PRACTITIONER

## 2024-12-17 PROCEDURE — 3074F SYST BP LT 130 MM HG: CPT | Performed by: NURSE PRACTITIONER

## 2024-12-17 PROCEDURE — 99212 OFFICE O/P EST SF 10 MIN: CPT | Performed by: NURSE PRACTITIONER

## 2024-12-17 PROCEDURE — 3078F DIAST BP <80 MM HG: CPT | Performed by: NURSE PRACTITIONER

## 2024-12-17 ASSESSMENT — ENCOUNTER SYMPTOMS
RESPIRATORY NEGATIVE: 1
SHORTNESS OF BREATH: 0

## 2024-12-17 NOTE — PROGRESS NOTES
discontinued due to discomfort and sores. The patient previously consumed 12 Mountain Dews daily but has since switched to 7-Up Zero Sugar. They also consume milk and Pedialyte, although less frequently now. They do not consume coffee with creamer, juices, Best-Aid, juice boxes, Gatorade, or protein drinks. The patient reports significant muscle mass loss. Their typical diet includes turkey sandwiches, ham, and cheese crackers with peanut butter. They used to add sugar to cottage cheese but have reduced this practice and also add sugar to macaroni and cheese. The patient has a family history of consuming [place] cuisine, which includes many sweet foods. They have expressed a desire to improve their dietary habits. The patient has a history of smoking three packs of cigarettes per day, which they have reduced to half a pack. They experienced weight gain following their first hip surgery due to decreased mobility, previously weighing over 600 pounds but have since lost weight. They weighed between 160 to 170 pounds upon graduating high school.    Supplemental Information  The patient has a history of sinusitis requiring hospitalization and was advised to drink Pedialyte to meet their nutritional needs.    SOCIAL HISTORY  The patient admits to smoking about half a pack a day, down from three packs a day 30 years ago. They were a  for 14 years.    MEDICATIONS  - Basaglar  - Glipizide  - Metformin  - NovoLog  - Atorvastatin  - Lisinopril  - Trulicity (past)  - Januvia (past)  - Jardiance (past)      ICD-10-CM    1. Type 2 diabetes mellitus with hyperglycemia, with long-term current use of insulin ([facility])  E11.65     Z79.4       2. Hypercholesterolemia  E78.00           Assessment & Plan  1. Diabetes mellitus - Blood pressure is within the normal range at 120/70. Dexcom report shows an average glucose level of 228 over the past two weeks, with a predicted A1c of 8.8%. They are in the target range 28% of the time,

## 2024-12-17 NOTE — PATIENT INSTRUCTIONS
Novolog (blue meal time insulin) Sliding Scale Insulin 2 units +++    Blood sugar Action     <70  Drink Juice      No extra insulin  150 - 200 2 units subcutaneous Insulin  201 - 250 4 units subcutaneous Insulin  251 - 300 6 units subcutaneous Insulin  301 - 350 8 units subcutaneous Insulin  351 - 400 10 units subcutaneous Insulin   > 400  12 units subcutaneous Insulin

## 2024-12-20 DIAGNOSIS — E11.42 TYPE 2 DIABETES MELLITUS WITH DIABETIC POLYNEUROPATHY, WITH LONG-TERM CURRENT USE OF INSULIN (HCC): ICD-10-CM

## 2024-12-20 DIAGNOSIS — Z79.4 TYPE 2 DIABETES MELLITUS WITH DIABETIC POLYNEUROPATHY, WITH LONG-TERM CURRENT USE OF INSULIN (HCC): ICD-10-CM

## 2024-12-20 DIAGNOSIS — E13.69 OTHER SPECIFIED DIABETES MELLITUS WITH OTHER SPECIFIED COMPLICATION, UNSPECIFIED WHETHER LONG TERM INSULIN USE (HCC): ICD-10-CM

## 2024-12-21 DIAGNOSIS — F17.200 SMOKER: ICD-10-CM

## 2024-12-21 DIAGNOSIS — I89.0 LYMPHEDEMA OF BOTH LOWER EXTREMITIES: ICD-10-CM

## 2024-12-21 DIAGNOSIS — R60.0 LOWER LEG EDEMA: ICD-10-CM

## 2024-12-21 DIAGNOSIS — R06.01 ORTHOPNEA: ICD-10-CM

## 2024-12-21 DIAGNOSIS — I87.2 VENOUS INSUFFICIENCY (CHRONIC) (PERIPHERAL): ICD-10-CM

## 2024-12-21 DIAGNOSIS — K21.9 GASTROESOPHAGEAL REFLUX DISEASE WITHOUT ESOPHAGITIS: ICD-10-CM

## 2024-12-21 DIAGNOSIS — J42 CHRONIC BRONCHITIS, UNSPECIFIED CHRONIC BRONCHITIS TYPE (HCC): ICD-10-CM

## 2024-12-21 DIAGNOSIS — R06.02 SOB (SHORTNESS OF BREATH): ICD-10-CM

## 2024-12-21 DIAGNOSIS — E66.01 MORBID OBESITY: ICD-10-CM

## 2024-12-21 DIAGNOSIS — R11.10 DRY HEAVES: ICD-10-CM

## 2024-12-21 DIAGNOSIS — L97.921 SKIN ULCER OF LEFT LOWER LEG, LIMITED TO BREAKDOWN OF SKIN (HCC): ICD-10-CM

## 2024-12-21 DIAGNOSIS — Z87.11 HISTORY OF PEPTIC ULCER DISEASE: ICD-10-CM

## 2024-12-23 NOTE — TELEPHONE ENCOUNTER
Chas Cooper is requesting a refill on the following medication(s):  Requested Prescriptions     Pending Prescriptions Disp Refills    furosemide (LASIX) 40 MG tablet [Pharmacy Med Name: Furosemide 40 MG Oral Tablet] 180 tablet 0     Sig: Take 1 tablet by mouth twice daily    famotidine (PEPCID) 20 MG tablet [Pharmacy Med Name: Famotidine 20 MG Oral Tablet] 180 tablet 0     Sig: Take 1 tablet by mouth twice daily       Last Visit Date (If Applicable):  10/23/2024    Next Visit Date:    2/10/2025

## 2024-12-23 NOTE — TELEPHONE ENCOUNTER
Chas Cooper is requesting a refill on the following medication(s):  Requested Prescriptions     Pending Prescriptions Disp Refills    BASAGLAR KWIKPEN 100 UNIT/ML injection pen [Pharmacy Med Name: Basaglar KwikPen 100 UNIT/ML Subcutaneous Solution Pen-injector] 15 mL 0     Sig: INJECT 65 UNITS SUBCUTANEOUSLY IN THE MORNING AND 25 UNITS AT BEDTIME       Last Visit Date (If Applicable):  10/23/2024    Next Visit Date:    12/21/2024

## 2024-12-24 DIAGNOSIS — E13.69 OTHER SPECIFIED DIABETES MELLITUS WITH OTHER SPECIFIED COMPLICATION, UNSPECIFIED WHETHER LONG TERM INSULIN USE (HCC): ICD-10-CM

## 2024-12-24 DIAGNOSIS — E11.42 TYPE 2 DIABETES MELLITUS WITH DIABETIC POLYNEUROPATHY, WITH LONG-TERM CURRENT USE OF INSULIN (HCC): ICD-10-CM

## 2024-12-24 DIAGNOSIS — Z79.4 TYPE 2 DIABETES MELLITUS WITH DIABETIC POLYNEUROPATHY, WITH LONG-TERM CURRENT USE OF INSULIN (HCC): ICD-10-CM

## 2024-12-24 RX ORDER — FUROSEMIDE 40 MG/1
40 TABLET ORAL 2 TIMES DAILY
Qty: 180 TABLET | Refills: 0 | Status: SHIPPED | OUTPATIENT
Start: 2024-12-24

## 2024-12-24 RX ORDER — INSULIN GLARGINE 100 [IU]/ML
INJECTION, SOLUTION SUBCUTANEOUS
Qty: 15 ML | Refills: 0 | Status: SHIPPED | OUTPATIENT
Start: 2024-12-24

## 2024-12-24 RX ORDER — FAMOTIDINE 20 MG/1
TABLET, FILM COATED ORAL
Qty: 180 TABLET | Refills: 0 | Status: SHIPPED | OUTPATIENT
Start: 2024-12-24

## 2025-01-06 RX ORDER — INSULIN GLARGINE 100 [IU]/ML
INJECTION, SOLUTION SUBCUTANEOUS
Qty: 15 ML | Refills: 0 | Status: SHIPPED | OUTPATIENT
Start: 2025-01-06

## 2025-01-06 NOTE — TELEPHONE ENCOUNTER
Chas Cooper is requesting a refill on the following medication(s):  Requested Prescriptions     Pending Prescriptions Disp Refills    BASAGLAR KWIKPEN 100 UNIT/ML injection pen 15 mL 0     Sig: INJECT 65 UNITS SUBCUTANEOUSLY IN THE MORNING AND 25 UNITS AT BEDTIME       Last Visit Date (If Applicable):  10/23/2024    Next Visit Date:    12/27/2024

## 2025-01-07 DIAGNOSIS — Z79.4 TYPE 2 DIABETES MELLITUS WITH DIABETIC POLYNEUROPATHY, WITH LONG-TERM CURRENT USE OF INSULIN (HCC): ICD-10-CM

## 2025-01-07 DIAGNOSIS — E13.69 OTHER SPECIFIED DIABETES MELLITUS WITH OTHER SPECIFIED COMPLICATION, UNSPECIFIED WHETHER LONG TERM INSULIN USE (HCC): ICD-10-CM

## 2025-01-07 DIAGNOSIS — E11.42 TYPE 2 DIABETES MELLITUS WITH DIABETIC POLYNEUROPATHY, WITH LONG-TERM CURRENT USE OF INSULIN (HCC): ICD-10-CM

## 2025-01-07 RX ORDER — INSULIN GLARGINE 100 [IU]/ML
INJECTION, SOLUTION SUBCUTANEOUS
Qty: 15 ML | Refills: 0 | Status: SHIPPED | OUTPATIENT
Start: 2025-01-07

## 2025-01-07 NOTE — TELEPHONE ENCOUNTER
Chas Cooper is requesting a refill on the following medication(s):  Requested Prescriptions     Pending Prescriptions Disp Refills    BASAGLAR KWIKPEN 100 UNIT/ML injection pen [Pharmacy Med Name: Basaglar KwikPen 100 UNIT/ML Subcutaneous Solution Pen-injector] 15 mL 0     Sig: INJECT 65 UNITS SUBCUTANEOUSLY IN THE MORNING AND 25 AT BEDTIME       Last Visit Date (If Applicable):  10/23/2024    Next Visit Date:    2/10/2025

## 2025-01-09 RX ORDER — POTASSIUM CHLORIDE 750 MG/1
10 TABLET, EXTENDED RELEASE ORAL DAILY
Qty: 90 TABLET | Refills: 0 | Status: SHIPPED | OUTPATIENT
Start: 2025-01-09

## 2025-01-09 NOTE — TELEPHONE ENCOUNTER
Chas Cooper is requesting a refill on the following medication(s):  Requested Prescriptions     Pending Prescriptions Disp Refills    potassium chloride (KLOR-CON) 10 MEQ extended release tablet [Pharmacy Med Name: Potassium Chloride ER 10 MEQ Oral Tablet Extended Release] 90 tablet 0     Sig: Take 1 tablet by mouth once daily       Last Visit Date (If Applicable):  10/23/2024    Next Visit Date:    2/10/2025

## 2025-01-13 DIAGNOSIS — R21 RASH OF BACK: ICD-10-CM

## 2025-01-13 DIAGNOSIS — F17.200 SMOKING: ICD-10-CM

## 2025-01-13 DIAGNOSIS — E66.01 MORBID OBESITY: ICD-10-CM

## 2025-01-13 DIAGNOSIS — I87.2 VENOUS INSUFFICIENCY (CHRONIC) (PERIPHERAL): ICD-10-CM

## 2025-01-13 DIAGNOSIS — M26.629 TEMPOROMANDIBULAR JOINT-PAIN-DYSFUNCTION SYNDROME (TMJ): ICD-10-CM

## 2025-01-13 DIAGNOSIS — E11.8 TYPE 2 DIABETES MELLITUS WITH COMPLICATION, WITHOUT LONG-TERM CURRENT USE OF INSULIN (HCC): ICD-10-CM

## 2025-01-13 DIAGNOSIS — G47.33 OSA ON CPAP: ICD-10-CM

## 2025-01-13 DIAGNOSIS — M16.11 PRIMARY OSTEOARTHRITIS OF RIGHT HIP: ICD-10-CM

## 2025-01-13 DIAGNOSIS — M19.90 ARTHRITIS: ICD-10-CM

## 2025-01-13 DIAGNOSIS — I89.0 LYMPHEDEMA OF BOTH LOWER EXTREMITIES: ICD-10-CM

## 2025-01-13 DIAGNOSIS — J42 CHRONIC BRONCHITIS, UNSPECIFIED CHRONIC BRONCHITIS TYPE (HCC): ICD-10-CM

## 2025-01-13 DIAGNOSIS — E78.2 MIXED HYPERLIPIDEMIA: ICD-10-CM

## 2025-01-13 DIAGNOSIS — I10 ESSENTIAL HYPERTENSION: ICD-10-CM

## 2025-01-13 DIAGNOSIS — M62.830 BACK MUSCLE SPASM: ICD-10-CM

## 2025-01-13 RX ORDER — CYCLOBENZAPRINE HCL 10 MG
TABLET ORAL
Qty: 90 TABLET | Refills: 2 | Status: SHIPPED | OUTPATIENT
Start: 2025-01-13

## 2025-01-15 DIAGNOSIS — Z79.4 TYPE 2 DIABETES MELLITUS WITH DIABETIC PERIPHERAL ANGIOPATHY WITHOUT GANGRENE, WITH LONG-TERM CURRENT USE OF INSULIN (HCC): ICD-10-CM

## 2025-01-15 DIAGNOSIS — E11.51 TYPE 2 DIABETES MELLITUS WITH DIABETIC PERIPHERAL ANGIOPATHY WITHOUT GANGRENE, WITH LONG-TERM CURRENT USE OF INSULIN (HCC): ICD-10-CM

## 2025-01-15 RX ORDER — GLIPIZIDE 10 MG/1
10 TABLET, FILM COATED, EXTENDED RELEASE ORAL DAILY
Qty: 90 TABLET | Refills: 2 | Status: SHIPPED | OUTPATIENT
Start: 2025-01-15

## 2025-01-15 NOTE — TELEPHONE ENCOUNTER
Chas Cooper is requesting a refill on the following medication(s):  Requested Prescriptions     Pending Prescriptions Disp Refills    glipiZIDE (GLUCOTROL XL) 10 MG extended release tablet [Pharmacy Med Name: glipiZIDE ER 10 MG Oral Tablet Extended Release 24 Hour] 90 tablet 0     Sig: Take 1 tablet by mouth once daily       Last Visit Date (If Applicable):  10/23/2024    Next Visit Date:    2/10/2025

## 2025-02-08 SDOH — ECONOMIC STABILITY: FOOD INSECURITY: WITHIN THE PAST 12 MONTHS, YOU WORRIED THAT YOUR FOOD WOULD RUN OUT BEFORE YOU GOT MONEY TO BUY MORE.: SOMETIMES TRUE

## 2025-02-08 SDOH — ECONOMIC STABILITY: FOOD INSECURITY: WITHIN THE PAST 12 MONTHS, THE FOOD YOU BOUGHT JUST DIDN'T LAST AND YOU DIDN'T HAVE MONEY TO GET MORE.: SOMETIMES TRUE

## 2025-02-08 SDOH — ECONOMIC STABILITY: TRANSPORTATION INSECURITY
IN THE PAST 12 MONTHS, HAS THE LACK OF TRANSPORTATION KEPT YOU FROM MEDICAL APPOINTMENTS OR FROM GETTING MEDICATIONS?: YES

## 2025-02-08 SDOH — ECONOMIC STABILITY: INCOME INSECURITY: IN THE LAST 12 MONTHS, WAS THERE A TIME WHEN YOU WERE NOT ABLE TO PAY THE MORTGAGE OR RENT ON TIME?: NO

## 2025-02-08 SDOH — HEALTH STABILITY: PHYSICAL HEALTH: ON AVERAGE, HOW MANY DAYS PER WEEK DO YOU ENGAGE IN MODERATE TO STRENUOUS EXERCISE (LIKE A BRISK WALK)?: 5 DAYS

## 2025-02-08 SDOH — ECONOMIC STABILITY: TRANSPORTATION INSECURITY
IN THE PAST 12 MONTHS, HAS LACK OF TRANSPORTATION KEPT YOU FROM MEETINGS, WORK, OR FROM GETTING THINGS NEEDED FOR DAILY LIVING?: NO

## 2025-02-08 ASSESSMENT — PATIENT HEALTH QUESTIONNAIRE - PHQ9
SUM OF ALL RESPONSES TO PHQ QUESTIONS 1-9: 0
SUM OF ALL RESPONSES TO PHQ9 QUESTIONS 1 & 2: 0
2. FEELING DOWN, DEPRESSED OR HOPELESS: NOT AT ALL
1. LITTLE INTEREST OR PLEASURE IN DOING THINGS: NOT AT ALL

## 2025-02-08 ASSESSMENT — LIFESTYLE VARIABLES
HOW OFTEN DO YOU HAVE A DRINK CONTAINING ALCOHOL: MONTHLY OR LESS
HOW OFTEN DO YOU HAVE SIX OR MORE DRINKS ON ONE OCCASION: 1
HOW OFTEN DO YOU HAVE A DRINK CONTAINING ALCOHOL: 2
HOW MANY STANDARD DRINKS CONTAINING ALCOHOL DO YOU HAVE ON A TYPICAL DAY: 1 OR 2
HOW MANY STANDARD DRINKS CONTAINING ALCOHOL DO YOU HAVE ON A TYPICAL DAY: 1

## 2025-02-09 DIAGNOSIS — E11.42 TYPE 2 DIABETES MELLITUS WITH DIABETIC POLYNEUROPATHY, WITH LONG-TERM CURRENT USE OF INSULIN (HCC): ICD-10-CM

## 2025-02-09 DIAGNOSIS — E13.69 OTHER SPECIFIED DIABETES MELLITUS WITH OTHER SPECIFIED COMPLICATION, UNSPECIFIED WHETHER LONG TERM INSULIN USE (HCC): ICD-10-CM

## 2025-02-09 DIAGNOSIS — Z79.4 TYPE 2 DIABETES MELLITUS WITH DIABETIC POLYNEUROPATHY, WITH LONG-TERM CURRENT USE OF INSULIN (HCC): ICD-10-CM

## 2025-02-10 ENCOUNTER — OFFICE VISIT (OUTPATIENT)
Dept: FAMILY MEDICINE CLINIC | Age: 60
End: 2025-02-10
Payer: MEDICARE

## 2025-02-10 VITALS
OXYGEN SATURATION: 97 % | DIASTOLIC BLOOD PRESSURE: 80 MMHG | WEIGHT: 315 LBS | BODY MASS INDEX: 61.17 KG/M2 | HEART RATE: 97 BPM | SYSTOLIC BLOOD PRESSURE: 118 MMHG

## 2025-02-10 DIAGNOSIS — I10 ESSENTIAL HYPERTENSION: ICD-10-CM

## 2025-02-10 DIAGNOSIS — E78.00 HYPERCHOLESTEROLEMIA: Primary | ICD-10-CM

## 2025-02-10 DIAGNOSIS — Z79.4 TYPE 2 DIABETES MELLITUS WITH DIABETIC PERIPHERAL ANGIOPATHY WITHOUT GANGRENE, WITH LONG-TERM CURRENT USE OF INSULIN (HCC): ICD-10-CM

## 2025-02-10 DIAGNOSIS — J42 CHRONIC BRONCHITIS, UNSPECIFIED CHRONIC BRONCHITIS TYPE (HCC): ICD-10-CM

## 2025-02-10 DIAGNOSIS — E11.51 TYPE 2 DIABETES MELLITUS WITH DIABETIC PERIPHERAL ANGIOPATHY WITHOUT GANGRENE, WITH LONG-TERM CURRENT USE OF INSULIN (HCC): ICD-10-CM

## 2025-02-10 DIAGNOSIS — Z00.00 MEDICARE ANNUAL WELLNESS VISIT, SUBSEQUENT: ICD-10-CM

## 2025-02-10 DIAGNOSIS — Z00.00 ROUTINE GENERAL MEDICAL EXAMINATION AT A HEALTH CARE FACILITY: ICD-10-CM

## 2025-02-10 PROBLEM — L97.921 SKIN ULCER OF LEFT LOWER LEG, LIMITED TO BREAKDOWN OF SKIN (HCC): Status: RESOLVED | Noted: 2021-04-12 | Resolved: 2025-02-10

## 2025-02-10 LAB — HBA1C MFR BLD: 9.5 %

## 2025-02-10 PROCEDURE — 99396 PREV VISIT EST AGE 40-64: CPT

## 2025-02-10 PROCEDURE — 83036 HEMOGLOBIN GLYCOSYLATED A1C: CPT

## 2025-02-10 RX ORDER — INSULIN GLARGINE 100 [IU]/ML
INJECTION, SOLUTION SUBCUTANEOUS
Qty: 15 ML | Refills: 0 | Status: SHIPPED | OUTPATIENT
Start: 2025-02-10 | End: 2025-02-10

## 2025-02-10 NOTE — PATIENT INSTRUCTIONS
[facility]  Address: [address], [place] [address]  Contact Number: [phone]  Website: [email or web address]  Eligibility: Emergency disaster relief for individuals and families  Service Area: [place]  Target Population: Anyone  More Information: Please contact them at [phone]    [place] [facility]  Address: [facility], [address] [place] [address]  Contact Number: [phone]  Website: [email or web address]  Eligibility: [facility] Residents Only  Service Area: [facility]  Meal served the 4th Thursday of the month 5:00pm - 6:00pm  Coats, gloves and hats    [facility] Women's Health Resource  Address: [address] [address]  Contact Number: [phone]  Website: [email or web address]  Hours Of Operation: Monday and Wednesday 9:30am to 5:30 pm  Eligibility: Any  Service Area: Any  Formula  Car Seats  Cribs  Baby items    [facility]  Address: Confidential Location  Contact Number: [phone]  Hours Of Operation: Monday-Friday 9:00am -5:00pm  Eligibility: [facility] Only  Service Area: [facility]  Food Pantry  Prescription Assistance  Rental Assistance  Utility Assistance    Furniture [facility]  Address: [facility], [address] [place] [address], [place] [address]  Contact Number: [phone] or [phone] fax  Website: [email or web address]  Eligibility: All communities in [facility] including [place], [place], [place], [place] and [place].  Service Area: All communities in [facility] including [place], [place], [place], [place] and [place].  Household Items: Appliances - must have form faxed to them.  Furniture: Gently used furniture and appliances - must have form faxed to them.    [facility]

## 2025-02-10 NOTE — PROGRESS NOTES
Medicare Annual Wellness Visit    Chas Cooper is here for Medicare AWV (Patient is here today for medicare wellness. ) and Leg Pain (He is having more left leg pain)    Assessment & Plan   Hypercholesterolemia  Assessment & Plan:   Chronic, at goal (stable), continue current treatment plan did discuss diet he does need to reduce his carbohydrate intake to help lower his blood sugar but overall health.  Reduce saturated fats.  Orders:  -     Lipid Panel; Future  Type 2 diabetes mellitus with diabetic peripheral angiopathy without gangrene, with long-term current use of insulin (HCC)  Assessment & Plan:   Chronic, not at goal (unstable), changes made today: Utilize insulin sliding scale as well as monitoring blood sugar more closely.  Work on diet, has not had any hypoglycemic episodes.  A1c is well above goal range again.  Orders:  -     Albumin/Creatinine Ratio, Urine; Future  -     POCT Hb A1C (glycosylated hemoglobin)  -     Comprehensive Metabolic Panel, Fasting; Future  Medicare annual wellness visit, subsequent  Chronic bronchitis, unspecified chronic bronchitis type (HCC)  Assessment & Plan:   Chronic, at goal (stable), continue current treatment plan without exacerbations currently, still smoking.  Refuses smoking cessation information at this time  Routine general medical examination at a health care facility  -     CBC with Auto Differential; Future  -     Comprehensive Metabolic Panel, Fasting; Future  Essential hypertension  -     CBC with Auto Differential; Future  -     Comprehensive Metabolic Panel, Fasting; Future       Return if symptoms worsen or fail to improve.     Subjective       Patient's complete Health Risk Assessment and screening values have been reviewed and are found in Flowsheets. The following problems were reviewed today and where indicated follow up appointments were made and/or referrals ordered.    Positive Risk Factor Screenings with Interventions:                Abnormal BMI

## 2025-02-12 DIAGNOSIS — E13.69 OTHER SPECIFIED DIABETES MELLITUS WITH OTHER SPECIFIED COMPLICATION, UNSPECIFIED WHETHER LONG TERM INSULIN USE (HCC): ICD-10-CM

## 2025-02-12 DIAGNOSIS — Z79.4 TYPE 2 DIABETES MELLITUS WITH DIABETIC POLYNEUROPATHY, WITH LONG-TERM CURRENT USE OF INSULIN (HCC): ICD-10-CM

## 2025-02-12 DIAGNOSIS — E11.42 TYPE 2 DIABETES MELLITUS WITH DIABETIC POLYNEUROPATHY, WITH LONG-TERM CURRENT USE OF INSULIN (HCC): ICD-10-CM

## 2025-02-12 RX ORDER — INSULIN GLARGINE 100 [IU]/ML
INJECTION, SOLUTION SUBCUTANEOUS
Qty: 15 ML | Refills: 0 | Status: SHIPPED | OUTPATIENT
Start: 2025-02-12

## 2025-02-12 NOTE — TELEPHONE ENCOUNTER
Chas Cooper is requesting a refill on the following medication(s):  Requested Prescriptions     Pending Prescriptions Disp Refills    BASAGLAR KWIKPEN 100 UNIT/ML injection pen [Pharmacy Med Name: Basaglar KwikPen 100 UNIT/ML Subcutaneous Solution Pen-injector] 15 mL 0     Sig: INJECT 65 UNITS SUBCUTANEOUSLY IN THE MORNING AND  25 UNITS AT BEDTIME       Last Visit Date (If Applicable):  2/10/2025    Next Visit Date:    4/23/2025

## 2025-02-13 NOTE — ASSESSMENT & PLAN NOTE
Chronic, not at goal (unstable), changes made today: Utilize insulin sliding scale as well as monitoring blood sugar more closely.  Work on diet, has not had any hypoglycemic episodes.  A1c is well above goal range again.

## 2025-02-13 NOTE — ASSESSMENT & PLAN NOTE
Chronic, at goal (stable), continue current treatment plan without exacerbations currently, still smoking.  Refuses smoking cessation information at this time

## 2025-02-13 NOTE — ASSESSMENT & PLAN NOTE
Chronic, at goal (stable), continue current treatment plan did discuss diet he does need to reduce his carbohydrate intake to help lower his blood sugar but overall health.  Reduce saturated fats.

## 2025-02-15 DIAGNOSIS — F17.200 SMOKER: ICD-10-CM

## 2025-02-15 DIAGNOSIS — I89.0 LYMPHEDEMA OF BOTH LOWER EXTREMITIES: ICD-10-CM

## 2025-02-15 DIAGNOSIS — E66.01 MORBID OBESITY: ICD-10-CM

## 2025-02-15 DIAGNOSIS — Z87.11 HISTORY OF PEPTIC ULCER DISEASE: ICD-10-CM

## 2025-02-15 DIAGNOSIS — K21.9 GASTROESOPHAGEAL REFLUX DISEASE WITHOUT ESOPHAGITIS: ICD-10-CM

## 2025-02-15 DIAGNOSIS — J42 CHRONIC BRONCHITIS, UNSPECIFIED CHRONIC BRONCHITIS TYPE (HCC): ICD-10-CM

## 2025-02-15 DIAGNOSIS — R11.10 DRY HEAVES: ICD-10-CM

## 2025-02-15 DIAGNOSIS — L97.921 SKIN ULCER OF LEFT LOWER LEG, LIMITED TO BREAKDOWN OF SKIN (HCC): ICD-10-CM

## 2025-02-15 DIAGNOSIS — I87.2 VENOUS INSUFFICIENCY (CHRONIC) (PERIPHERAL): ICD-10-CM

## 2025-02-17 RX ORDER — OMEPRAZOLE 40 MG/1
CAPSULE, DELAYED RELEASE ORAL
Qty: 90 CAPSULE | Refills: 0 | Status: SHIPPED | OUTPATIENT
Start: 2025-02-17

## 2025-02-17 RX ORDER — POTASSIUM CHLORIDE 750 MG/1
10 TABLET, EXTENDED RELEASE ORAL DAILY
Qty: 90 TABLET | Refills: 0 | Status: SHIPPED | OUTPATIENT
Start: 2025-02-17

## 2025-02-17 NOTE — TELEPHONE ENCOUNTER
Chas Cooper is requesting a refill on the following medication(s):  Requested Prescriptions     Pending Prescriptions Disp Refills    omeprazole (PRILOSEC) 40 MG delayed release capsule [Pharmacy Med Name: Omeprazole 40 MG Oral Capsule Delayed Release] 90 capsule 0     Sig: TAKE 1 CAPSULE BY MOUTH ONCE DAILY IN THE MORNING BEFORE BREAKFAST    potassium chloride (KLOR-CON) 10 MEQ extended release tablet [Pharmacy Med Name: Potassium Chloride ER 10 MEQ Oral Tablet Extended Release] 90 tablet 0     Sig: Take 1 tablet by mouth once daily       Last Visit Date (If Applicable):  2/10/2025    Next Visit Date:    4/23/2025

## 2025-02-25 DIAGNOSIS — E13.69 OTHER SPECIFIED DIABETES MELLITUS WITH OTHER SPECIFIED COMPLICATION, UNSPECIFIED WHETHER LONG TERM INSULIN USE (HCC): ICD-10-CM

## 2025-02-25 DIAGNOSIS — E11.42 TYPE 2 DIABETES MELLITUS WITH DIABETIC POLYNEUROPATHY, WITH LONG-TERM CURRENT USE OF INSULIN (HCC): ICD-10-CM

## 2025-02-25 DIAGNOSIS — Z79.4 TYPE 2 DIABETES MELLITUS WITH DIABETIC POLYNEUROPATHY, WITH LONG-TERM CURRENT USE OF INSULIN (HCC): ICD-10-CM

## 2025-02-26 RX ORDER — INSULIN GLARGINE 100 [IU]/ML
INJECTION, SOLUTION SUBCUTANEOUS
Qty: 15 ML | Refills: 0 | Status: SHIPPED | OUTPATIENT
Start: 2025-02-26

## 2025-03-04 ENCOUNTER — OFFICE VISIT (OUTPATIENT)
Dept: DIABETES SERVICES | Age: 60
End: 2025-03-04
Payer: MEDICARE

## 2025-03-04 VITALS
SYSTOLIC BLOOD PRESSURE: 122 MMHG | DIASTOLIC BLOOD PRESSURE: 78 MMHG | OXYGEN SATURATION: 97 % | HEIGHT: 72 IN | HEART RATE: 93 BPM | WEIGHT: 315 LBS | BODY MASS INDEX: 42.66 KG/M2

## 2025-03-04 DIAGNOSIS — Z79.4 TYPE 2 DIABETES MELLITUS WITH HYPERGLYCEMIA, WITH LONG-TERM CURRENT USE OF INSULIN (HCC): Primary | ICD-10-CM

## 2025-03-04 DIAGNOSIS — E11.65 TYPE 2 DIABETES MELLITUS WITH HYPERGLYCEMIA, WITH LONG-TERM CURRENT USE OF INSULIN (HCC): Primary | ICD-10-CM

## 2025-03-04 DIAGNOSIS — E78.00 HYPERCHOLESTEROLEMIA: ICD-10-CM

## 2025-03-04 DIAGNOSIS — I10 PRIMARY HYPERTENSION: ICD-10-CM

## 2025-03-04 PROCEDURE — G2211 COMPLEX E/M VISIT ADD ON: HCPCS | Performed by: NURSE PRACTITIONER

## 2025-03-04 PROCEDURE — 95251 CONT GLUC MNTR ANALYSIS I&R: CPT | Performed by: NURSE PRACTITIONER

## 2025-03-04 PROCEDURE — 3046F HEMOGLOBIN A1C LEVEL >9.0%: CPT | Performed by: NURSE PRACTITIONER

## 2025-03-04 PROCEDURE — 3078F DIAST BP <80 MM HG: CPT | Performed by: NURSE PRACTITIONER

## 2025-03-04 PROCEDURE — 99214 OFFICE O/P EST MOD 30 MIN: CPT | Performed by: NURSE PRACTITIONER

## 2025-03-04 PROCEDURE — 99212 OFFICE O/P EST SF 10 MIN: CPT | Performed by: NURSE PRACTITIONER

## 2025-03-04 PROCEDURE — 3074F SYST BP LT 130 MM HG: CPT | Performed by: NURSE PRACTITIONER

## 2025-03-04 RX ORDER — SEMAGLUTIDE 1.34 MG/ML
0.5 INJECTION, SOLUTION SUBCUTANEOUS WEEKLY
Qty: 1 ADJUSTABLE DOSE PRE-FILLED PEN SYRINGE | Refills: 3 | Status: SHIPPED | OUTPATIENT
Start: 2025-03-04

## 2025-03-04 RX ORDER — SEMAGLUTIDE 1.34 MG/ML
INJECTION, SOLUTION SUBCUTANEOUS
Qty: 1 ADJUSTABLE DOSE PRE-FILLED PEN SYRINGE | Refills: 3 | Status: SHIPPED | COMMUNITY
Start: 2025-03-04

## 2025-03-04 ASSESSMENT — ENCOUNTER SYMPTOMS
RESPIRATORY NEGATIVE: 1
SHORTNESS OF BREATH: 0

## 2025-03-04 NOTE — PATIENT INSTRUCTIONS
Start Ozempic   Inject .25 mg ONCE weekly for 4 weeks then increase to 0.5 mg ONCE weekly.   Stay well hydrated and cut portions in half.     -complete ozempic patient assistance

## 2025-03-05 NOTE — PROGRESS NOTES
Prior authorization completed for Ozempic via [email or web address]. Awaiting determination.   
10:31 AM    CO2 28 03/07/2024 10:31 AM    BUN 19 03/07/2024 10:31 AM    CREATININE 0.8 03/07/2024 10:31 AM    GLUCOSE 253 03/07/2024 10:31 AM    CALCIUM 9.2 03/07/2024 10:31 AM    LABGLOM > 60.0 03/07/2024 10:31 AM    LABGLOM >60 08/07/2020 09:40 AM      Lab Results   Component Value Date    CHOL 170 08/19/2022    TRIG 179 08/19/2022    HDL 35 (L) 08/19/2022    LDL 99.2 08/19/2022    VLDL 36 08/19/2022    CHOLHDLRATIO 4.86 (H) 08/19/2022     Lab Results   Component Value Date    ALT 43 03/05/2024    AST 37 03/05/2024    ALKPHOS 94 03/05/2024    BILITOT 0.5 03/05/2024             Attestation    The patient (or guardian, if applicable) and other individuals in attendance with the patient were advised that Artificial Intelligence will be utilized during this visit to record, process the conversation to generate a clinical note, and support improvement of the AI technology. The patient (or guardian, if applicable) and other individuals in attendance at the appointment consented to the use of AI, including the recording.        Electronically signed by IONA Lopez CNP on 3/4/2025 at 8:37 AM      (Please note that portions of this note were completed with a voice-recognition program. Efforts were made to edit the dictation but occasionally words are mis-transcribed.)

## 2025-03-12 DIAGNOSIS — Z79.4 TYPE 2 DIABETES MELLITUS WITH DIABETIC POLYNEUROPATHY, WITH LONG-TERM CURRENT USE OF INSULIN (HCC): ICD-10-CM

## 2025-03-12 DIAGNOSIS — E13.69 OTHER SPECIFIED DIABETES MELLITUS WITH OTHER SPECIFIED COMPLICATION, UNSPECIFIED WHETHER LONG TERM INSULIN USE (HCC): ICD-10-CM

## 2025-03-12 DIAGNOSIS — E11.42 TYPE 2 DIABETES MELLITUS WITH DIABETIC POLYNEUROPATHY, WITH LONG-TERM CURRENT USE OF INSULIN (HCC): ICD-10-CM

## 2025-03-13 RX ORDER — INSULIN GLARGINE 100 [IU]/ML
INJECTION, SOLUTION SUBCUTANEOUS
Qty: 15 ML | Refills: 0 | Status: SHIPPED | OUTPATIENT
Start: 2025-03-13

## 2025-03-13 NOTE — TELEPHONE ENCOUNTER
Chas Cooper is requesting a refill on the following medication(s):  Requested Prescriptions     Pending Prescriptions Disp Refills    BASAGLAR KWIKPEN 100 UNIT/ML injection pen [Pharmacy Med Name: Basaglar KwikPen 100 UNIT/ML Subcutaneous Solution Pen-injector] 15 mL 0     Sig: INJECT 65 UNITS SUBCUTANEOUSLY IN THE MORNING AND 25 AT BEDTIME       Last Visit Date (If Applicable):  2/10/2025    Next Visit Date:    4/23/2025

## 2025-03-19 DIAGNOSIS — E78.2 MIXED HYPERLIPIDEMIA: ICD-10-CM

## 2025-03-19 DIAGNOSIS — G47.33 OSA ON CPAP: ICD-10-CM

## 2025-03-19 DIAGNOSIS — E11.8 TYPE 2 DIABETES MELLITUS WITH COMPLICATION, WITHOUT LONG-TERM CURRENT USE OF INSULIN (HCC): ICD-10-CM

## 2025-03-19 DIAGNOSIS — R21 RASH OF BACK: ICD-10-CM

## 2025-03-19 DIAGNOSIS — R11.10 DRY HEAVES: ICD-10-CM

## 2025-03-19 DIAGNOSIS — M16.11 PRIMARY OSTEOARTHRITIS OF RIGHT HIP: ICD-10-CM

## 2025-03-19 DIAGNOSIS — I10 ESSENTIAL HYPERTENSION: ICD-10-CM

## 2025-03-19 DIAGNOSIS — R60.0 LOWER LEG EDEMA: ICD-10-CM

## 2025-03-19 DIAGNOSIS — J42 CHRONIC BRONCHITIS, UNSPECIFIED CHRONIC BRONCHITIS TYPE (HCC): ICD-10-CM

## 2025-03-19 DIAGNOSIS — E66.01 MORBID OBESITY: ICD-10-CM

## 2025-03-19 DIAGNOSIS — M19.90 ARTHRITIS: ICD-10-CM

## 2025-03-19 DIAGNOSIS — I87.2 VENOUS INSUFFICIENCY (CHRONIC) (PERIPHERAL): ICD-10-CM

## 2025-03-19 DIAGNOSIS — K21.9 GASTROESOPHAGEAL REFLUX DISEASE WITHOUT ESOPHAGITIS: ICD-10-CM

## 2025-03-19 DIAGNOSIS — M26.629 TEMPOROMANDIBULAR JOINT-PAIN-DYSFUNCTION SYNDROME (TMJ): ICD-10-CM

## 2025-03-19 DIAGNOSIS — L97.921 SKIN ULCER OF LEFT LOWER LEG, LIMITED TO BREAKDOWN OF SKIN (HCC): ICD-10-CM

## 2025-03-19 DIAGNOSIS — F17.200 SMOKER: ICD-10-CM

## 2025-03-19 DIAGNOSIS — Z87.11 HISTORY OF PEPTIC ULCER DISEASE: ICD-10-CM

## 2025-03-19 DIAGNOSIS — I89.0 LYMPHEDEMA OF BOTH LOWER EXTREMITIES: ICD-10-CM

## 2025-03-19 DIAGNOSIS — F17.200 SMOKING: ICD-10-CM

## 2025-03-19 DIAGNOSIS — M62.830 BACK MUSCLE SPASM: ICD-10-CM

## 2025-03-19 DIAGNOSIS — R06.02 SOB (SHORTNESS OF BREATH): ICD-10-CM

## 2025-03-19 DIAGNOSIS — R06.01 ORTHOPNEA: ICD-10-CM

## 2025-03-19 RX ORDER — FUROSEMIDE 40 MG/1
40 TABLET ORAL 2 TIMES DAILY
Qty: 180 TABLET | Refills: 0 | Status: SHIPPED | OUTPATIENT
Start: 2025-03-19

## 2025-03-19 RX ORDER — ATORVASTATIN CALCIUM 40 MG/1
40 TABLET, FILM COATED ORAL DAILY
Qty: 100 TABLET | Refills: 0 | Status: SHIPPED | OUTPATIENT
Start: 2025-03-19

## 2025-03-19 RX ORDER — LISINOPRIL 20 MG/1
20 TABLET ORAL DAILY
Qty: 100 TABLET | Refills: 0 | Status: SHIPPED | OUTPATIENT
Start: 2025-03-19

## 2025-03-19 RX ORDER — FAMOTIDINE 20 MG/1
20 TABLET, FILM COATED ORAL 2 TIMES DAILY
Qty: 180 TABLET | Refills: 0 | Status: SHIPPED | OUTPATIENT
Start: 2025-03-19

## 2025-03-19 NOTE — TELEPHONE ENCOUNTER
Chas KENT Aubreytoby is requesting a refill on the following medication(s):  Requested Prescriptions     Pending Prescriptions Disp Refills    atorvastatin (LIPITOR) 40 MG tablet [Pharmacy Med Name: Atorvastatin Calcium 40 MG Oral Tablet] 100 tablet 0     Sig: Take 1 tablet by mouth once daily    lisinopril (PRINIVIL;ZESTRIL) 20 MG tablet [Pharmacy Med Name: Lisinopril 20 MG Oral Tablet] 100 tablet 0     Sig: Take 1 tablet by mouth once daily    metFORMIN (GLUCOPHAGE) 1000 MG tablet [Pharmacy Med Name: metFORMIN HCl 1000 MG Oral Tablet] 200 tablet 0     Sig: TAKE 1 TABLET BY MOUTH TWICE DAILY WITH MEALS    furosemide (LASIX) 40 MG tablet [Pharmacy Med Name: Furosemide 40 MG Oral Tablet] 180 tablet 0     Sig: Take 1 tablet by mouth twice daily    famotidine (PEPCID) 20 MG tablet [Pharmacy Med Name: Famotidine 20 MG Oral Tablet] 180 tablet 0     Sig: Take 1 tablet by mouth twice daily       Last Visit Date (If Applicable):  2/10/2025    Next Visit Date:    4/23/2025

## 2025-03-31 DIAGNOSIS — E11.42 TYPE 2 DIABETES MELLITUS WITH DIABETIC POLYNEUROPATHY, WITH LONG-TERM CURRENT USE OF INSULIN (HCC): ICD-10-CM

## 2025-03-31 DIAGNOSIS — E13.69 OTHER SPECIFIED DIABETES MELLITUS WITH OTHER SPECIFIED COMPLICATION, UNSPECIFIED WHETHER LONG TERM INSULIN USE (HCC): ICD-10-CM

## 2025-03-31 DIAGNOSIS — Z79.4 TYPE 2 DIABETES MELLITUS WITH DIABETIC POLYNEUROPATHY, WITH LONG-TERM CURRENT USE OF INSULIN (HCC): ICD-10-CM

## 2025-03-31 RX ORDER — INSULIN GLARGINE 100 [IU]/ML
INJECTION, SOLUTION SUBCUTANEOUS
Qty: 15 ML | Refills: 0 | Status: SHIPPED | OUTPATIENT
Start: 2025-03-31 | End: 2025-04-01

## 2025-04-01 DIAGNOSIS — E11.42 TYPE 2 DIABETES MELLITUS WITH DIABETIC POLYNEUROPATHY, WITH LONG-TERM CURRENT USE OF INSULIN (HCC): ICD-10-CM

## 2025-04-01 DIAGNOSIS — E13.69 OTHER SPECIFIED DIABETES MELLITUS WITH OTHER SPECIFIED COMPLICATION, UNSPECIFIED WHETHER LONG TERM INSULIN USE (HCC): ICD-10-CM

## 2025-04-01 DIAGNOSIS — Z79.4 TYPE 2 DIABETES MELLITUS WITH DIABETIC POLYNEUROPATHY, WITH LONG-TERM CURRENT USE OF INSULIN (HCC): ICD-10-CM

## 2025-04-01 RX ORDER — INSULIN GLARGINE 100 [IU]/ML
INJECTION, SOLUTION SUBCUTANEOUS
Qty: 15 ML | Refills: 0 | Status: SHIPPED | OUTPATIENT
Start: 2025-04-01

## 2025-04-01 NOTE — TELEPHONE ENCOUNTER
Pt would like a 90 day supply      Chas DONTE Cooper is requesting a refill on the following medication(s):  Requested Prescriptions     Pending Prescriptions Disp Refills    BASAGLAR KWIKPEN 100 UNIT/ML injection pen [Pharmacy Med Name: Basaglar KwikPen 100 UNIT/ML Subcutaneous Solution Pen-injector] 15 mL 0     Sig: INJECT 65 UNITS SUBCUTANEOUSLY IN THE MORNING AND 25 UNITS AT BEDTIME       Last Visit Date (If Applicable):  2/10/2025    Next Visit Date:    4/23/2025

## 2025-04-22 LAB
ALBUMIN/GLOBULIN RATIO: 1.3 G/DL
ALBUMIN: 4.1 G/DL (ref 3.5–5)
ALP BLD-CCNC: 91 UNITS/L (ref 38–126)
ALT SERPL-CCNC: 55 UNITS/L (ref 4–50)
ANION GAP SERPL CALCULATED.3IONS-SCNC: 6.9 MMOL/L (ref 3–11)
AST SERPL-CCNC: 49 UNITS/L (ref 17–59)
BASOPHILS ABSOLUTE: 0.09 X10E3/?L (ref 0–0.3)
BASOPHILS RELATIVE PERCENT: 0.98 % (ref 0–3)
BILIRUB SERPL-MCNC: 0.4 MG/DL (ref 0.2–1.3)
BUN BLDV-MCNC: 16 MG/DL (ref 9–20)
CALCIUM SERPL-MCNC: 9.1 MG/DL (ref 8.4–10.2)
CHLORIDE BLD-SCNC: 101 MMOL/L (ref 98–120)
CHOLESTEROL, TOTAL: 155 MG/DL (ref 50–200)
CHOLESTEROL/HDL RATIO: 4.7 RATIO (ref 0–4.5)
CO2: 27 MMOL/L (ref 22–31)
CREAT SERPL-MCNC: 0.9 MG/DL (ref 0.7–1.3)
CREATININE, RANDOM URINE: 53.9 MG/DL (ref 20–370)
EOSINOPHILS ABSOLUTE: 0.51 X10E3/?L (ref 0–1.1)
EOSINOPHILS RELATIVE PERCENT: 5.29 % (ref 0–10)
GFR, ESTIMATED: > 60
GLOBULIN: 3.1 G/DL
GLUCOSE: 129 MG/DL (ref 75–110)
HCT VFR BLD CALC: 47.4 % (ref 42–52)
HDLC SERPL-MCNC: 33 MG/DL (ref 36–68)
HEMOGLOBIN: 14.3 G/DL (ref 13.8–17.8)
LDL CHOLESTEROL: 72.8 MG/DL (ref 0–160)
LYMPHOCYTES ABSOLUTE: 3.12 X10E3/?L (ref 1–5.5)
LYMPHOCYTES RELATIVE PERCENT: 32.37 % (ref 20–51.1)
MCH RBC QN AUTO: 27.3 PG (ref 28.5–32.5)
MCHC RBC AUTO-ENTMCNC: 30.2 G/DL (ref 32–37)
MCV RBC AUTO: 90.4 FL (ref 80–94)
MICROALBUMIN/CREAT 24H UR: < 0.6 MG/DL (ref 0–1.7)
MICROALBUMIN/CREAT UR-RTO: NORMAL
MONOCYTES ABSOLUTE: 0.8 X10E3/?L (ref 0.1–1)
MONOCYTES RELATIVE PERCENT: 8.27 % (ref 1.7–9.3)
NEUTROPHILS ABSOLUTE: 5.11 X10E3/?L (ref 2–8.1)
NEUTROPHILS RELATIVE PERCENT: 53.08 % (ref 42.2–75.2)
PDW BLD-RTO: 14.5 % (ref 10–15.5)
PLATELET # BLD: 256.4 THOU/MM3 (ref 130–400)
POTASSIUM SERPL-SCNC: 4.4 MMOL/L (ref 3.6–5)
RBC # BLD: 5.25 M/UL (ref 4.7–6.1)
SODIUM BLD-SCNC: 135 MMOL/L (ref 135–145)
TOTAL PROTEIN: 7.2 G/DL (ref 6.3–8.2)
TRIGL SERPL-MCNC: 246 MG/DL (ref 10–250)
VLDLC SERPL CALC-MCNC: 49 MG/DL (ref 0–50)
WBC # BLD: 9.6 THOU/ML3 (ref 4.8–10.8)

## 2025-04-22 SDOH — ECONOMIC STABILITY: FOOD INSECURITY: WITHIN THE PAST 12 MONTHS, THE FOOD YOU BOUGHT JUST DIDN'T LAST AND YOU DIDN'T HAVE MONEY TO GET MORE.: SOMETIMES TRUE

## 2025-04-22 SDOH — ECONOMIC STABILITY: INCOME INSECURITY: IN THE LAST 12 MONTHS, WAS THERE A TIME WHEN YOU WERE NOT ABLE TO PAY THE MORTGAGE OR RENT ON TIME?: NO

## 2025-04-22 SDOH — ECONOMIC STABILITY: FOOD INSECURITY: WITHIN THE PAST 12 MONTHS, YOU WORRIED THAT YOUR FOOD WOULD RUN OUT BEFORE YOU GOT MONEY TO BUY MORE.: SOMETIMES TRUE

## 2025-04-23 ENCOUNTER — RESULTS FOLLOW-UP (OUTPATIENT)
Dept: FAMILY MEDICINE CLINIC | Age: 60
End: 2025-04-23

## 2025-04-23 ENCOUNTER — OFFICE VISIT (OUTPATIENT)
Dept: FAMILY MEDICINE CLINIC | Age: 60
End: 2025-04-23
Payer: MEDICARE

## 2025-04-23 VITALS — SYSTOLIC BLOOD PRESSURE: 134 MMHG | OXYGEN SATURATION: 98 % | DIASTOLIC BLOOD PRESSURE: 76 MMHG | HEART RATE: 100 BPM

## 2025-04-23 DIAGNOSIS — M54.50 THORACOLUMBAR BACK PAIN: Primary | ICD-10-CM

## 2025-04-23 DIAGNOSIS — M54.6 THORACOLUMBAR BACK PAIN: Primary | ICD-10-CM

## 2025-04-23 PROCEDURE — 3075F SYST BP GE 130 - 139MM HG: CPT

## 2025-04-23 PROCEDURE — 99212 OFFICE O/P EST SF 10 MIN: CPT

## 2025-04-23 PROCEDURE — 3078F DIAST BP <80 MM HG: CPT

## 2025-04-23 RX ORDER — POTASSIUM CHLORIDE 1500 MG/1
20 TABLET, EXTENDED RELEASE ORAL DAILY
COMMUNITY
Start: 2025-04-15

## 2025-04-23 RX ORDER — TRAMADOL HYDROCHLORIDE 50 MG/1
50 TABLET ORAL EVERY 6 HOURS PRN
Qty: 21 TABLET | Refills: 0 | Status: SHIPPED | OUTPATIENT
Start: 2025-04-23 | End: 2025-04-24

## 2025-04-23 NOTE — PROGRESS NOTES
Objective   Physical Exam  Vitals and nursing note reviewed.   Constitutional:       Appearance: Normal appearance.   HENT:      Head: Normocephalic.   Cardiovascular:      Rate and Rhythm: Normal rate and regular rhythm.      Pulses: Normal pulses.      Heart sounds: Normal heart sounds.   Pulmonary:      Effort: Pulmonary effort is normal.      Breath sounds: Normal breath sounds. No wheezing.   Abdominal:      General: Bowel sounds are normal.   Musculoskeletal:      Thoracic back: Spasms and tenderness present. No swelling, edema or deformity.      Lumbar back: Spasms and tenderness present. Negative right straight leg raise test and negative left straight leg raise test.        Back:    Neurological:      Mental Status: He is alert.                  An electronic signature was used to authenticate this note.    --Jamel Buckner, APRN - CNP

## 2025-04-23 NOTE — PATIENT INSTRUCTIONS
[facility]  Address: [address], [place] [address]  Contact Number: [phone]  Website: [email or web address]  Eligibility: Emergency disaster relief for individuals and families  Service Area: [place]  Target Population: Anyone  More Information: Please contact them at [phone]    [place] [facility]  Address: [facility], [address] [place] [address]  Contact Number: [phone]  Website: [email or web address]  Eligibility: [facility] Residents Only  Service Area: [facility]  Meal served the 4th Thursday of the month 5:00pm - 6:00pm  Coats, gloves and hats    [facility] Women's Health Resource  Address: [address] [address]  Contact Number: [phone]  Website: [email or web address]  Hours Of Operation: Monday and Wednesday 9:30am to 5:30 pm  Eligibility: Any  Service Area: Any  Formula  Car Seats  Cribs  Baby items    [facility]  Address: Confidential Location  Contact Number: [phone]  Hours Of Operation: Monday-Friday 9:00am -5:00pm  Eligibility: [facility] Only  Service Area: [facility]  Food Pantry  Prescription Assistance  Rental Assistance  Utility Assistance    Furniture [facility]  Address: [facility], [address] [place] [address], [place] [address]  Contact Number: [phone] or [phone] fax  Website: [email or web address]  Eligibility: All communities in [facility] including [place], [place], [place], [place] and [place].  Service Area: All communities in [facility] including [place], [place], [place] and [place].  Household Items: Appliances - must have form faxed to them.  Furniture: Gently used furniture and appliances - must have form faxed to them.    [facility]

## 2025-04-24 ENCOUNTER — TELEPHONE (OUTPATIENT)
Dept: FAMILY MEDICINE CLINIC | Age: 60
End: 2025-04-24

## 2025-04-24 DIAGNOSIS — M62.830 BACK MUSCLE SPASM: ICD-10-CM

## 2025-04-24 DIAGNOSIS — M54.50 THORACOLUMBAR BACK PAIN: Primary | ICD-10-CM

## 2025-04-24 DIAGNOSIS — M54.6 THORACOLUMBAR BACK PAIN: Primary | ICD-10-CM

## 2025-04-24 RX ORDER — TRAMADOL HYDROCHLORIDE 50 MG/1
50 TABLET ORAL EVERY 6 HOURS PRN
Qty: 36 TABLET | Refills: 0 | Status: SHIPPED | OUTPATIENT
Start: 2025-04-24 | End: 2025-05-08

## 2025-04-24 NOTE — TELEPHONE ENCOUNTER
Pts wife called stating that the tramadol is helping a lot for him. She is asking if here could be more sent to the pharmacy. Please advise

## 2025-04-27 DIAGNOSIS — Z79.4 TYPE 2 DIABETES MELLITUS WITH DIABETIC POLYNEUROPATHY, WITH LONG-TERM CURRENT USE OF INSULIN (HCC): ICD-10-CM

## 2025-04-27 DIAGNOSIS — E13.69 OTHER SPECIFIED DIABETES MELLITUS WITH OTHER SPECIFIED COMPLICATION, UNSPECIFIED WHETHER LONG TERM INSULIN USE (HCC): ICD-10-CM

## 2025-04-27 DIAGNOSIS — E11.42 TYPE 2 DIABETES MELLITUS WITH DIABETIC POLYNEUROPATHY, WITH LONG-TERM CURRENT USE OF INSULIN (HCC): ICD-10-CM

## 2025-04-28 RX ORDER — INSULIN GLARGINE 100 [IU]/ML
INJECTION, SOLUTION SUBCUTANEOUS
Qty: 15 ML | Refills: 0 | Status: SHIPPED | OUTPATIENT
Start: 2025-04-28

## 2025-04-28 NOTE — TELEPHONE ENCOUNTER
Chas DONTE Burgostoby is requesting a refill on the following medication(s):  Requested Prescriptions     Pending Prescriptions Disp Refills    BASAGLAR KWIKPEN 100 UNIT/ML injection pen [Pharmacy Med Name: Basaglar KwikPen 100 UNIT/ML Subcutaneous Solution Pen-injector] 15 mL 0     Sig: INJECT 65 UNITS SUBCUTANEOUSLY IN THE MORNING AND 25 UNITAS AT BEDTIME.       Last Visit Date (If Applicable):  4/23/2025    Next Visit Date:    Visit date not found

## 2025-04-29 ASSESSMENT — ENCOUNTER SYMPTOMS
ABDOMINAL PAIN: 0
BACK PAIN: 1
SHORTNESS OF BREATH: 0
COUGH: 0
COLOR CHANGE: 0

## 2025-05-11 DIAGNOSIS — E13.69 OTHER SPECIFIED DIABETES MELLITUS WITH OTHER SPECIFIED COMPLICATION, UNSPECIFIED WHETHER LONG TERM INSULIN USE (HCC): ICD-10-CM

## 2025-05-11 DIAGNOSIS — Z79.4 TYPE 2 DIABETES MELLITUS WITH DIABETIC POLYNEUROPATHY, WITH LONG-TERM CURRENT USE OF INSULIN (HCC): ICD-10-CM

## 2025-05-11 DIAGNOSIS — E11.42 TYPE 2 DIABETES MELLITUS WITH DIABETIC POLYNEUROPATHY, WITH LONG-TERM CURRENT USE OF INSULIN (HCC): ICD-10-CM

## 2025-05-12 RX ORDER — INSULIN GLARGINE 100 [IU]/ML
INJECTION, SOLUTION SUBCUTANEOUS
Qty: 15 ML | Refills: 3 | Status: SHIPPED | OUTPATIENT
Start: 2025-05-12

## 2025-05-12 NOTE — TELEPHONE ENCOUNTER
Chas Cooper is requesting a refill on the following medication(s):  Requested Prescriptions     Pending Prescriptions Disp Refills    BASAGLAR KWIKPEN 100 UNIT/ML injection pen [Pharmacy Med Name: Basaglar KwikPen 100 UNIT/ML Subcutaneous Solution Pen-injector] 15 mL 3     Sig: INJECT 65 UNITS SUBCUTANEOUSLY IN THE MORNING AND 25 UNITS AT BEDTIME       Last Visit Date (If Applicable):  4/23/2025    Next Visit Date:    Visit date not found

## 2025-05-13 RX ORDER — POTASSIUM CHLORIDE 750 MG/1
10 TABLET, EXTENDED RELEASE ORAL DAILY
Qty: 90 TABLET | Refills: 0 | OUTPATIENT
Start: 2025-05-13

## 2025-06-11 ENCOUNTER — OFFICE VISIT (OUTPATIENT)
Dept: FAMILY MEDICINE CLINIC | Age: 60
End: 2025-06-11
Payer: MEDICARE

## 2025-06-11 ENCOUNTER — HOSPITAL ENCOUNTER (OUTPATIENT)
Age: 60
Setting detail: SPECIMEN
Discharge: HOME | End: 2025-06-11
Payer: MEDICARE

## 2025-06-11 VITALS
DIASTOLIC BLOOD PRESSURE: 80 MMHG | HEIGHT: 72 IN | HEART RATE: 97 BPM | BODY MASS INDEX: 42.66 KG/M2 | SYSTOLIC BLOOD PRESSURE: 122 MMHG | OXYGEN SATURATION: 97 % | WEIGHT: 315 LBS

## 2025-06-11 DIAGNOSIS — Z79.4 TYPE 2 DIABETES MELLITUS WITH DIABETIC POLYNEUROPATHY, WITH LONG-TERM CURRENT USE OF INSULIN (HCC): ICD-10-CM

## 2025-06-11 DIAGNOSIS — E11.42 TYPE 2 DIABETES MELLITUS WITH DIABETIC POLYNEUROPATHY, WITH LONG-TERM CURRENT USE OF INSULIN (HCC): ICD-10-CM

## 2025-06-11 LAB
CREAT UR-MCNC: 160 MG/DL (ref 39–259)
HBA1C MFR BLD: 7.5 %
MICROALBUMIN UR-MCNC: <12 MG/L (ref 0–20)
MICROALBUMIN/CREAT UR-RTO: NORMAL MCG/MG CREAT (ref 0–17)

## 2025-06-11 PROCEDURE — 82570 ASSAY OF URINE CREATININE: CPT

## 2025-06-11 PROCEDURE — 3051F HG A1C>EQUAL 7.0%<8.0%: CPT

## 2025-06-11 PROCEDURE — 3074F SYST BP LT 130 MM HG: CPT

## 2025-06-11 PROCEDURE — 83036 HEMOGLOBIN GLYCOSYLATED A1C: CPT

## 2025-06-11 PROCEDURE — PBSHW POCT GLYCOSYLATED HEMOGLOBIN (HGB A1C)

## 2025-06-11 PROCEDURE — 99214 OFFICE O/P EST MOD 30 MIN: CPT

## 2025-06-11 PROCEDURE — 82043 UR ALBUMIN QUANTITATIVE: CPT

## 2025-06-11 PROCEDURE — 99211 OFF/OP EST MAY X REQ PHY/QHP: CPT

## 2025-06-11 PROCEDURE — 3079F DIAST BP 80-89 MM HG: CPT

## 2025-06-11 RX ORDER — MOXIFLOXACIN 5 MG/ML
1 SOLUTION/ DROPS OPHTHALMIC 3 TIMES DAILY
COMMUNITY
Start: 2025-06-04 | End: 2025-06-11

## 2025-06-11 RX ORDER — CIPROFLOXACIN AND DEXAMETHASONE 3; 1 MG/ML; MG/ML
4 SUSPENSION/ DROPS AURICULAR (OTIC) 2 TIMES DAILY
COMMUNITY
Start: 2025-06-09 | End: 2025-06-14

## 2025-06-11 RX ORDER — PREDNISOLONE ACETATE 10 MG/ML
1 SUSPENSION/ DROPS OPHTHALMIC 4 TIMES DAILY
COMMUNITY
Start: 2025-06-04 | End: 2025-06-15

## 2025-06-11 NOTE — ASSESSMENT & PLAN NOTE
Chronic, at goal (stable), continue current treatment plan much improved from prior down to 7.5 from 9.5.  Continue to monitor blood sugar, continue working to get this down with exercise, diet and being consistent in medications.  Will continue on Glucotrol 10 mg once daily, did discuss doing 10 mg in morning and 5 mg with dinner however he would like to use diet and exercise still before we change any more medications.  He denies any side effect such as hypoglycemia with this    Orders:    POCT Hb A1C (glycosylated hemoglobin)    Albumin/Creatinine Ratio, Urine; Future

## 2025-06-11 NOTE — PROGRESS NOTES
Chas Cooper (:  1965) is a 59 y.o. male,Established patient, here for evaluation of the following chief complaint(s):  Pre-op Exam (Pt is here for a pre op exam. Pt is having cataract surgery on 25 with [facility] in [place]. )  Right eye first       Assessment & Plan  Type 2 diabetes mellitus with diabetic polyneuropathy, with long-term current use of insulin (HCC)   Chronic, at goal (stable), continue current treatment plan much improved from prior down to 7.5 from 9.5.  Continue to monitor blood sugar, continue working to get this down with exercise, diet and being consistent in medications.  Will continue on Glucotrol 10 mg once daily, did discuss doing 10 mg in morning and 5 mg with dinner however he would like to use diet and exercise still before we change any more medications.  He denies any side effect such as hypoglycemia with this    Orders:    POCT Hb A1C (glycosylated hemoglobin)    Albumin/Creatinine Ratio, Urine; Future      No follow-ups on file.       Subjective   Bakari is here today for a follow-up visit.  Does have history of type 2 diabetes mellitus at last office it was uncontrolled A1c was above 9.  Today is much better close to 7.5.  He denies any polydipsia, polyuria overall he is doing quite well.  He is exercising and moving much more.  He states when the weather is much better it is very easy for him to get outside and slowly incorporate more exercise into his daily regimen.  He does plan to have cataract surgery on his right eye next week.  At this point he is medically optimized for that upcoming procedure.  Health maintenance is updated and reviewed at this time.  He does plan to continue to monitor his blood sugar closely and work on his diet with improving exercise        Review of Systems   Constitutional:  Negative for chills and fever.   HENT:  Negative for congestion.    Respiratory:  Negative for cough and shortness of breath.    Gastrointestinal:  Negative for

## 2025-06-12 ENCOUNTER — RESULTS FOLLOW-UP (OUTPATIENT)
Dept: FAMILY MEDICINE CLINIC | Age: 60
End: 2025-06-12

## 2025-06-16 DIAGNOSIS — Z87.11 HISTORY OF PEPTIC ULCER DISEASE: ICD-10-CM

## 2025-06-16 DIAGNOSIS — J42 CHRONIC BRONCHITIS, UNSPECIFIED CHRONIC BRONCHITIS TYPE (HCC): ICD-10-CM

## 2025-06-16 DIAGNOSIS — L97.921 SKIN ULCER OF LEFT LOWER LEG, LIMITED TO BREAKDOWN OF SKIN (HCC): ICD-10-CM

## 2025-06-16 DIAGNOSIS — R06.02 SOB (SHORTNESS OF BREATH): ICD-10-CM

## 2025-06-16 DIAGNOSIS — K21.9 GASTROESOPHAGEAL REFLUX DISEASE WITHOUT ESOPHAGITIS: ICD-10-CM

## 2025-06-16 DIAGNOSIS — F17.200 SMOKER: ICD-10-CM

## 2025-06-16 DIAGNOSIS — I89.0 LYMPHEDEMA OF BOTH LOWER EXTREMITIES: ICD-10-CM

## 2025-06-16 DIAGNOSIS — I87.2 VENOUS INSUFFICIENCY (CHRONIC) (PERIPHERAL): ICD-10-CM

## 2025-06-16 DIAGNOSIS — R60.0 LOWER LEG EDEMA: ICD-10-CM

## 2025-06-16 DIAGNOSIS — R11.10 DRY HEAVES: ICD-10-CM

## 2025-06-16 DIAGNOSIS — E66.01 MORBID OBESITY (HCC): ICD-10-CM

## 2025-06-16 DIAGNOSIS — R06.01 ORTHOPNEA: ICD-10-CM

## 2025-06-16 RX ORDER — FAMOTIDINE 20 MG/1
20 TABLET, FILM COATED ORAL 2 TIMES DAILY
Qty: 180 TABLET | Refills: 0 | Status: SHIPPED | OUTPATIENT
Start: 2025-06-16

## 2025-06-16 RX ORDER — FUROSEMIDE 40 MG/1
40 TABLET ORAL 2 TIMES DAILY
Qty: 180 TABLET | Refills: 0 | Status: SHIPPED | OUTPATIENT
Start: 2025-06-16

## 2025-06-16 NOTE — TELEPHONE ENCOUNTER
Chas Cooper is requesting a refill on the following medication(s):  Requested Prescriptions     Pending Prescriptions Disp Refills    furosemide (LASIX) 40 MG tablet [Pharmacy Med Name: Furosemide 40 MG Oral Tablet] 180 tablet 0     Sig: Take 1 tablet by mouth twice daily    famotidine (PEPCID) 20 MG tablet [Pharmacy Med Name: Famotidine 20 MG Oral Tablet] 180 tablet 0     Sig: Take 1 tablet by mouth twice daily       Last Visit Date (If Applicable):  6/11/2025    Next Visit Date:    Visit date not found

## 2025-06-17 ASSESSMENT — ENCOUNTER SYMPTOMS
COUGH: 0
COLOR CHANGE: 0
BACK PAIN: 1
SHORTNESS OF BREATH: 0
ABDOMINAL PAIN: 0

## 2025-06-26 DIAGNOSIS — I89.0 LYMPHEDEMA OF BOTH LOWER EXTREMITIES: ICD-10-CM

## 2025-06-26 DIAGNOSIS — I10 ESSENTIAL HYPERTENSION: ICD-10-CM

## 2025-06-26 DIAGNOSIS — E78.2 MIXED HYPERLIPIDEMIA: ICD-10-CM

## 2025-06-26 DIAGNOSIS — J42 CHRONIC BRONCHITIS, UNSPECIFIED CHRONIC BRONCHITIS TYPE (HCC): ICD-10-CM

## 2025-06-26 DIAGNOSIS — I87.2 VENOUS INSUFFICIENCY (CHRONIC) (PERIPHERAL): ICD-10-CM

## 2025-06-26 DIAGNOSIS — E66.01 MORBID OBESITY (HCC): ICD-10-CM

## 2025-06-26 DIAGNOSIS — G47.33 OSA ON CPAP: ICD-10-CM

## 2025-06-26 DIAGNOSIS — M26.629 TEMPOROMANDIBULAR JOINT-PAIN-DYSFUNCTION SYNDROME (TMJ): ICD-10-CM

## 2025-06-26 DIAGNOSIS — E11.8 TYPE 2 DIABETES MELLITUS WITH COMPLICATION, WITHOUT LONG-TERM CURRENT USE OF INSULIN (HCC): ICD-10-CM

## 2025-06-26 DIAGNOSIS — M62.830 BACK MUSCLE SPASM: ICD-10-CM

## 2025-06-26 DIAGNOSIS — R21 RASH OF BACK: ICD-10-CM

## 2025-06-26 DIAGNOSIS — M19.90 ARTHRITIS: ICD-10-CM

## 2025-06-26 DIAGNOSIS — M16.11 PRIMARY OSTEOARTHRITIS OF RIGHT HIP: ICD-10-CM

## 2025-06-26 RX ORDER — LISINOPRIL 20 MG/1
20 TABLET ORAL DAILY
Qty: 100 TABLET | Refills: 0 | Status: SHIPPED | OUTPATIENT
Start: 2025-06-26

## 2025-06-26 RX ORDER — ATORVASTATIN CALCIUM 40 MG/1
40 TABLET, FILM COATED ORAL DAILY
Qty: 100 TABLET | Refills: 0 | Status: SHIPPED | OUTPATIENT
Start: 2025-06-26

## 2025-06-26 NOTE — TELEPHONE ENCOUNTER
Chas DONTE Burgostoby is requesting a refill on the following medication(s):  Requested Prescriptions     Pending Prescriptions Disp Refills    atorvastatin (LIPITOR) 40 MG tablet [Pharmacy Med Name: Atorvastatin Calcium 40 MG Oral Tablet] 100 tablet 0     Sig: Take 1 tablet by mouth once daily    lisinopril (PRINIVIL;ZESTRIL) 20 MG tablet [Pharmacy Med Name: Lisinopril 20 MG Oral Tablet] 100 tablet 0     Sig: Take 1 tablet by mouth once daily    metFORMIN (GLUCOPHAGE) 1000 MG tablet [Pharmacy Med Name: metFORMIN HCl 1000 MG Oral Tablet] 200 tablet 0     Sig: TAKE 1 TABLET BY MOUTH TWICE DAILY WITH MEALS       Last Visit Date (If Applicable):  6/11/2025    Next Visit Date:    Visit date not found

## 2025-06-28 DIAGNOSIS — R21 RASH OF BACK: ICD-10-CM

## 2025-06-28 DIAGNOSIS — J42 CHRONIC BRONCHITIS, UNSPECIFIED CHRONIC BRONCHITIS TYPE (HCC): ICD-10-CM

## 2025-06-28 DIAGNOSIS — G47.33 OSA ON CPAP: ICD-10-CM

## 2025-06-28 DIAGNOSIS — M19.90 ARTHRITIS: ICD-10-CM

## 2025-06-28 DIAGNOSIS — I87.2 VENOUS INSUFFICIENCY (CHRONIC) (PERIPHERAL): ICD-10-CM

## 2025-06-28 DIAGNOSIS — E11.8 TYPE 2 DIABETES MELLITUS WITH COMPLICATION, WITHOUT LONG-TERM CURRENT USE OF INSULIN (HCC): ICD-10-CM

## 2025-06-28 DIAGNOSIS — I89.0 LYMPHEDEMA OF BOTH LOWER EXTREMITIES: ICD-10-CM

## 2025-06-28 DIAGNOSIS — M16.11 PRIMARY OSTEOARTHRITIS OF RIGHT HIP: ICD-10-CM

## 2025-06-28 DIAGNOSIS — E66.01 MORBID OBESITY (HCC): ICD-10-CM

## 2025-06-28 DIAGNOSIS — I10 ESSENTIAL HYPERTENSION: ICD-10-CM

## 2025-06-28 DIAGNOSIS — M26.629 TEMPOROMANDIBULAR JOINT-PAIN-DYSFUNCTION SYNDROME (TMJ): ICD-10-CM

## 2025-06-28 DIAGNOSIS — E78.2 MIXED HYPERLIPIDEMIA: ICD-10-CM

## 2025-06-28 DIAGNOSIS — M62.830 BACK MUSCLE SPASM: ICD-10-CM

## 2025-06-30 RX ORDER — CYCLOBENZAPRINE HCL 10 MG
10 TABLET ORAL 3 TIMES DAILY PRN
Qty: 90 TABLET | Refills: 0 | Status: SHIPPED | OUTPATIENT
Start: 2025-06-30

## 2025-06-30 NOTE — TELEPHONE ENCOUNTER
Chas Cooper is requesting a refill on the following medication(s):  Requested Prescriptions     Pending Prescriptions Disp Refills    cyclobenzaprine (FLEXERIL) 10 MG tablet [Pharmacy Med Name: Cyclobenzaprine HCl 10 MG Oral Tablet] 90 tablet 0     Sig: Take 1 tablet by mouth three times daily as needed for muscle spasm       Last Visit Date (If Applicable):  6/11/2025    Next Visit Date:    Visit date not found

## 2025-07-18 DIAGNOSIS — E13.69 OTHER SPECIFIED DIABETES MELLITUS WITH OTHER SPECIFIED COMPLICATION, UNSPECIFIED WHETHER LONG TERM INSULIN USE (HCC): ICD-10-CM

## 2025-07-18 DIAGNOSIS — E11.42 TYPE 2 DIABETES MELLITUS WITH DIABETIC POLYNEUROPATHY, WITH LONG-TERM CURRENT USE OF INSULIN (HCC): ICD-10-CM

## 2025-07-18 DIAGNOSIS — Z79.4 TYPE 2 DIABETES MELLITUS WITH DIABETIC POLYNEUROPATHY, WITH LONG-TERM CURRENT USE OF INSULIN (HCC): ICD-10-CM

## 2025-07-18 RX ORDER — INSULIN GLARGINE 100 [IU]/ML
INJECTION, SOLUTION SUBCUTANEOUS
Qty: 15 ML | Refills: 0 | Status: SHIPPED | OUTPATIENT
Start: 2025-07-18

## 2025-07-18 NOTE — TELEPHONE ENCOUNTER
Chas Cooper is requesting a refill on the following medication(s):  Requested Prescriptions     Pending Prescriptions Disp Refills    BASAGLAR KWIKPEN 100 UNIT/ML injection pen [Pharmacy Med Name: Basaglar KwikPen 100 UNIT/ML Subcutaneous Solution Pen-injector] 15 mL 0     Sig: INJECT 65 UNITS SUBCUTANEOUSLY IN THE MORNING AND 25 UNITS AT BEDTIME       Last Visit Date (If Applicable):  6/11/2025    Next Visit Date:    Visit date not found

## 2025-07-28 RX ORDER — SEMAGLUTIDE 0.68 MG/ML
INJECTION, SOLUTION SUBCUTANEOUS
Qty: 3 ML | Refills: 5 | Status: SHIPPED | OUTPATIENT
Start: 2025-07-28

## 2025-07-28 NOTE — TELEPHONE ENCOUNTER
Refill request pended for your review.     Last Appt:  3/4/2025  Next Appt:  Visit date not found

## 2025-07-31 DIAGNOSIS — E13.69 OTHER SPECIFIED DIABETES MELLITUS WITH OTHER SPECIFIED COMPLICATION, UNSPECIFIED WHETHER LONG TERM INSULIN USE (HCC): ICD-10-CM

## 2025-07-31 DIAGNOSIS — E11.42 TYPE 2 DIABETES MELLITUS WITH DIABETIC POLYNEUROPATHY, WITH LONG-TERM CURRENT USE OF INSULIN (HCC): ICD-10-CM

## 2025-07-31 DIAGNOSIS — Z79.4 TYPE 2 DIABETES MELLITUS WITH DIABETIC POLYNEUROPATHY, WITH LONG-TERM CURRENT USE OF INSULIN (HCC): ICD-10-CM

## 2025-08-01 ENCOUNTER — TELEPHONE (OUTPATIENT)
Dept: FAMILY MEDICINE CLINIC | Age: 60
End: 2025-08-01

## 2025-08-04 RX ORDER — INSULIN GLARGINE 100 [IU]/ML
INJECTION, SOLUTION SUBCUTANEOUS
Qty: 15 ML | Refills: 0 | Status: SHIPPED | OUTPATIENT
Start: 2025-08-04

## 2025-08-16 DIAGNOSIS — E13.69 OTHER SPECIFIED DIABETES MELLITUS WITH OTHER SPECIFIED COMPLICATION, UNSPECIFIED WHETHER LONG TERM INSULIN USE (HCC): ICD-10-CM

## 2025-08-16 DIAGNOSIS — E11.42 TYPE 2 DIABETES MELLITUS WITH DIABETIC POLYNEUROPATHY, WITH LONG-TERM CURRENT USE OF INSULIN (HCC): ICD-10-CM

## 2025-08-16 DIAGNOSIS — Z79.4 TYPE 2 DIABETES MELLITUS WITH DIABETIC POLYNEUROPATHY, WITH LONG-TERM CURRENT USE OF INSULIN (HCC): ICD-10-CM

## 2025-08-19 DIAGNOSIS — E13.69 OTHER SPECIFIED DIABETES MELLITUS WITH OTHER SPECIFIED COMPLICATION, UNSPECIFIED WHETHER LONG TERM INSULIN USE (HCC): ICD-10-CM

## 2025-08-19 DIAGNOSIS — E11.42 TYPE 2 DIABETES MELLITUS WITH DIABETIC POLYNEUROPATHY, WITH LONG-TERM CURRENT USE OF INSULIN (HCC): ICD-10-CM

## 2025-08-19 DIAGNOSIS — Z79.4 TYPE 2 DIABETES MELLITUS WITH DIABETIC POLYNEUROPATHY, WITH LONG-TERM CURRENT USE OF INSULIN (HCC): ICD-10-CM

## 2025-08-19 RX ORDER — INSULIN ASPART 100 [IU]/ML
INJECTION, SOLUTION INTRAVENOUS; SUBCUTANEOUS
Qty: 5 ADJUSTABLE DOSE PRE-FILLED PEN SYRINGE | Refills: 2 | Status: SHIPPED | OUTPATIENT
Start: 2025-08-19

## 2025-08-19 RX ORDER — INSULIN GLARGINE 100 [IU]/ML
INJECTION, SOLUTION SUBCUTANEOUS
Qty: 30 ML | Refills: 3 | Status: SHIPPED | OUTPATIENT
Start: 2025-08-19 | End: 2025-08-19

## 2025-08-19 RX ORDER — INSULIN GLARGINE 100 [IU]/ML
INJECTION, SOLUTION SUBCUTANEOUS
Qty: 45 ML | Refills: 3 | Status: SHIPPED | OUTPATIENT
Start: 2025-08-19

## 2025-08-27 DIAGNOSIS — J42 CHRONIC BRONCHITIS, UNSPECIFIED CHRONIC BRONCHITIS TYPE (HCC): ICD-10-CM

## 2025-08-27 DIAGNOSIS — E78.2 MIXED HYPERLIPIDEMIA: ICD-10-CM

## 2025-08-27 DIAGNOSIS — E66.01 MORBID OBESITY (HCC): ICD-10-CM

## 2025-08-27 DIAGNOSIS — G47.33 OSA ON CPAP: ICD-10-CM

## 2025-08-27 DIAGNOSIS — M16.11 PRIMARY OSTEOARTHRITIS OF RIGHT HIP: ICD-10-CM

## 2025-08-27 DIAGNOSIS — M26.629 TEMPOROMANDIBULAR JOINT-PAIN-DYSFUNCTION SYNDROME (TMJ): ICD-10-CM

## 2025-08-27 DIAGNOSIS — M62.830 BACK MUSCLE SPASM: ICD-10-CM

## 2025-08-27 DIAGNOSIS — I10 ESSENTIAL HYPERTENSION: ICD-10-CM

## 2025-08-27 DIAGNOSIS — R21 RASH OF BACK: ICD-10-CM

## 2025-08-27 DIAGNOSIS — E11.8 TYPE 2 DIABETES MELLITUS WITH COMPLICATION, WITHOUT LONG-TERM CURRENT USE OF INSULIN (HCC): ICD-10-CM

## 2025-08-27 DIAGNOSIS — I89.0 LYMPHEDEMA OF BOTH LOWER EXTREMITIES: ICD-10-CM

## 2025-08-27 DIAGNOSIS — M19.90 ARTHRITIS: ICD-10-CM

## 2025-08-27 DIAGNOSIS — I87.2 VENOUS INSUFFICIENCY (CHRONIC) (PERIPHERAL): ICD-10-CM

## 2025-08-27 RX ORDER — CYCLOBENZAPRINE HCL 10 MG
10 TABLET ORAL 3 TIMES DAILY PRN
Qty: 90 TABLET | Refills: 0 | Status: SHIPPED | OUTPATIENT
Start: 2025-08-27